# Patient Record
Sex: FEMALE | Race: WHITE | NOT HISPANIC OR LATINO | Employment: FULL TIME | URBAN - METROPOLITAN AREA
[De-identification: names, ages, dates, MRNs, and addresses within clinical notes are randomized per-mention and may not be internally consistent; named-entity substitution may affect disease eponyms.]

---

## 2017-10-26 ENCOUNTER — ALLSCRIPTS OFFICE VISIT (OUTPATIENT)
Dept: OTHER | Facility: OTHER | Age: 62
End: 2017-10-26

## 2017-10-27 NOTE — PROGRESS NOTES
Assessment  1  Hyperlipemia (272 4) (E78 5)   2  Hypertension (401 9) (I10)   3  Hyperglycemia (790 29) (R73 9)   4  Hypothyroidism (244 9) (E03 9)    Plan  Hyperlipemia    · Levothyroxine Sodium 200 MCG Oral Tablet; TAKE ONE TABLET BY MOUTH EVERY DAY  Hyperlipemia, Hypertension    · Valsartan-Hydrochlorothiazide 320-12 5 MG Oral Tablet; TAKE 1 TABLET ONCE DAILY  Hypertension    · AmLODIPine Besylate 2 5 MG Oral Tablet; TAKE 1 TABLET DAILY  Hypothyroidism    · Follow-up visit in 6 months Evaluation and Treatment  Follow-up  Status: Hold For - Scheduling   Requested for: 26Oct2017    Discussion/Summary  Discussion Summary:   1  Hyperlipidemia  Patient states she is working stream the harder on her diet and expects to see some improvement in her cholesterol profile but she states this time she cannot afford to have any lab test performed  She patient promises to call when she has reinstituted her healthcare plan and can afford to have the labs done  Hypertension  As noted patient's blood pressure is still mildly elevated but she was told that if she continues to work on her weight may be able to reduce and even possibly discontinue medicines in the future  Patient is encouraged to continue to work hard on her weight loss  Hyperglycemia  As per 1 and 2 above  Hypothyroidism  Patient will continue with present thyroid medication and again she will have labs instituted once she is able to afford this  Counseling Documentation With Imm: The patient was counseled regarding diagnostic results,-- instructions for management,-- risk factor reductions,-- prognosis,-- patient and family education,-- impressions,-- risks and benefits of treatment options,-- importance of compliance with treatment  Medication SE Review and Pt Understands Tx: Possible side effects of new medications were reviewed with the patient/guardian today  The treatment plan was reviewed with the patient/guardian   The patient/guardian understands and agrees with the treatment plan      Chief Complaint  Chief Complaint Free Text Note Form: patient is here for 6 month follow up   [address] [place] [address] Zahra: Patient is here today for follow up of chronic conditions described in HPI  History of Present Illness  HPI: Patient is a 27-year-old female with a history of hypertension, hypothyroidism  Patient is here today for routine follow-up after 6 month period of time  Patient states she has been working extremely hard on her diet and with her calculations has lost 22 lb over the past 6 months  She states she is feeling much more energetic and she will continue with the diet and hopes are to lose approximately 100 lb from the beginning and get down to 150 lb  She was commended on this and the fact that she is doing this in increments and she is very successful  As noted patient's blood pressure today is mildly elevated and patient states she is having difficulties with being able to afford the medication and will be checking to see if she can get a better price at of different pharmacy  We did give her some suggestions  Review of Systems  Complete-Female:   Constitutional: recent weight loss, but-- as noted in HPI,-- no fever,-- not feeling poorly,-- no recent weight gain,-- no chills-- and-- not feeling tired  Eyes: No complaints of eye pain, no red eyes, no eyesight problems, no discharge, no dry eyes, no itching of eyes  ENT: no complaints of earache, no loss of hearing, no nose bleeds, no nasal discharge, no sore throat, no hoarseness  Cardiovascular: lower extremity edema-- and-- Some episodic lower extremity edema but this is not new and has not changed recently, but-- the heart rate was not slow,-- no chest pain,-- no intermittent leg claudication,-- the heart rate was not fast-- and-- no palpitations  Respiratory: No complaints of shortness of breath, no wheezing, no cough, no SOB on exertion, no orthopnea, no PND  Gastrointestinal: We discussed having a routine colonoscopy and patient states that she will have this arranged, but-- No complaints of abdominal pain, no constipation, no nausea or vomiting, no diarrhea, no bloody stools  Genitourinary: No complaints of dysuria, no incontinence, no pelvic pain, no dysmenorrhea, no vaginal discharge or bleeding  Musculoskeletal: arthralgias-- and-- joint stiffness, but-- no joint swelling,-- no limb pain,-- no myalgias-- and-- no limb swelling  Integumentary: No complaints of skin rash or lesions, no itching, no skin wounds, no breast pain or lump  Neurological: No complaints of headache, no confusion, no convulsions, no numbness, no dizziness or fainting, no tingling, no limb weakness, no difficulty walking  Psychiatric: Not suicidal, no sleep disturbance, no anxiety or depression, no change in personality, no emotional problems  Endocrine: No complaints of proptosis, no hot flashes, no muscle weakness, no deepening of the voice, no feelings of weakness  Hematologic/Lymphatic: No complaints of swollen glands, no swollen glands in the neck, does not bleed easily, does not bruise easily  ROS Reviewed:   ROS reviewed  Active Problems  1  Generalized osteoarthritis of hand (715 04) (M15 9)   2  Hyperglycemia (790 29) (R73 9)   3  Hyperlipemia (272 4) (E78 5)   4  Hypertension (401 9) (I10)   5  Hypothyroidism (244 9) (E03 9)   6  Vitamin D deficiency (268 9) (E55 9)    Family History  Father    1  Family history of heart disease (V17 49) (Z82 49)    Social History   · Former smoker (V15 82) (Z80 086)    Current Meds   1  AmLODIPine Besylate 2 5 MG Oral Tablet; Take 1 tablet daily; Therapy: 92SGX2105 to (Evaluate:80Mwq0805)  Requested for: 93KWK9297; Last Rx:09Mar2017   Ordered   2  Daily Multiple Vitamins Oral Tablet; TAKE 1 TABLET DAILY; Therapy: 48WLM2174 to [phone] Recorded   3   Diclofenac Sodium 1 % Transdermal Gel; APPLY TO UPPER EXTREMITIES, 2 GM OF GEL TO   AFFECTED AREA 4 TIMES DAILY  DO NOT APPLY MORE THAN 8 GM DAILY TO ANY ONE AFFECTED   JOINT; Therapy: 14VOG5119 to (Last Rx:09Jan2017) Ordered   4  Levothyroxine Sodium 200 MCG Oral Tablet; TAKE ONE TABLET BY MOUTH EVERY DAY; Therapy: 12IWY6337 to (Evaluate:04Sep2017)  Requested for: 32QUP5967; Last Rx:09Mar2017   Ordered   5  Magnesium 200 MG Oral Tablet; TAKE 1 TABLET DAILY AS DIRECTED; Therapy: 26GRG7719 to Recorded   6  Valsartan-Hydrochlorothiazide 320-12 5 MG Oral Tablet; Take 1 tablet once daily; Therapy: 46ZIX0135 to (Evaluate:04Sep2017)  Requested for: 70GNV5345; Last Rx:09Mar2017   Ordered   7  Vitamin D 2000 UNIT Oral Tablet; Take 1 tablet daily; Therapy: 96BLN8304 to (Evaluate:24Apr2018) Recorded    Allergies  1  No Known Drug Allergies    Vitals  Vital Signs    Recorded: 19HRL9985 06:48PM   Temperature 98 1 F   Heart Rate 92   Respiration 16   Systolic 949   Diastolic 82   Height 5 ft    Weight 220 lb    BMI Calculated 42 97   BSA Calculated 1 94   O2 Saturation 95     Physical Exam    Constitutional Pleasant overweight 29-year-old female who is awake alert in no acute distress and oriented Ã3  Eyes   Conjunctiva and lids: No swelling, erythema or discharge  Pupils and irises: Equal, round and reactive to light  Ears, Nose, Mouth, and Throat   External inspection of ears and nose: Normal     Otoscopic examination: Tympanic membranes translucent with normal light reflex  Canals patent without erythema  Nasal mucosa, septum, and turbinates: Normal without edema or erythema  Oropharynx: Abnormal  -- Some redundancy to her oral mucosa and slightly enlarged tongue  Pulmonary   Respiratory effort: No increased work of breathing or signs of respiratory distress  Auscultation of lungs: Clear to auscultation  Cardiovascular   Palpation of heart: Normal PMI, no thrills      Auscultation of heart: Normal rate and rhythm, normal S1 and S2, without murmurs  Examination of extremities for edema and/or varicosities: Abnormal  -- Trace edema bilateral lower extremities  Carotid pulses: Normal     Abdomen   Abdomen: Abnormal  -- Obese soft nontender with positive bowel sounds Ã4 quadrants  Liver and spleen: No hepatomegaly or splenomegaly  Musculoskeletal   Gait and station: Normal     Digits and nails: Abnormal  -- Minor arthritic changes to the hands and digits  Inspection/palpation of joints, bones, and muscles: Normal     Skin   Skin and subcutaneous tissue: Normal without rashes or lesions  Neurologic   Cranial nerves: Cranial nerves 2-12 intact  Reflexes: Abnormal  -- Absent patella and Achilles tendon reflexes bilaterally  Sensation: No sensory loss      Psychiatric   Orientation to person, place, and time: Normal     Mood and affect: Normal          Future Appointments    Date/Time Provider Specialty Site   12/28/2017 05:00 PM Mikayla Finnegan DO Internal Medicine [facility]   Electronically signed by : Britney Panda DO; Oct 26 2017  7:41PM EST                       (Author)

## 2017-12-28 ENCOUNTER — ALLSCRIPTS OFFICE VISIT (OUTPATIENT)
Dept: OTHER | Facility: OTHER | Age: 62
End: 2017-12-28

## 2017-12-28 DIAGNOSIS — E78.5 HYPERLIPIDEMIA: ICD-10-CM

## 2017-12-29 NOTE — PROGRESS NOTES
Assessment   1  Acute sinusitis (461 9) (J01 90)   2  Hyperlipemia (272 4) (E78 5)   3  Hypertension (401 9) (I10)   4  Hypothyroidism (244 9) (E03 9)    Plan   Acute sinusitis    · Amoxicillin-Pot Clavulanate 500-125 MG Oral Tablet (Augmentin); TAKE 1 TABLET TWICE DAILY    AFTER MEALS UNTIL FINISHED  Hyperlipemia    · (1) LIPID PANEL, FASTING; Status:Active; Requested for:22Ikv4707;    · (1) TSH; Status:Active; Requested for:11Ofh5042;   Hypothyroidism    · Follow-up visit in 6 months Evaluation and Treatment  Follow-up  Status: Hold For - Scheduling     Requested for: 19Wxl0358    Discussion/Summary   Discussion Summary:    1  Acute sinusitis  Patient was placed on Augmentin 500 milligrams p o  b i d  with food for the next 10 days  She was told if not improving to please call for re-evaluation  The choice of antibiotics were made because patient has been sick for over 10 days  Hyperlipidemia  Had patient was unable to afford to have a lipid profile performed recently and hopefully she will be able to obtain this in the near future  A slip was given for lipid profile to be for performed prior to her next visit in 6 months  Hypertension  Patient's blood pressure is mildly elevated  Patient is to continue with present medication  She still has not talk to her pharmacist about cost saving alternatives to the start in  Patient will notify us if they have found a medication that will be effective at a lower cost    Hypothyroidism  Patient's thyroid function has been stable and patient will have this checked again in 6 months  Counseling Documentation With Imm: The patient was counseled regarding diagnostic results,-- instructions for management,-- risk factor reductions,-- prognosis,-- patient and family education,-- impressions,-- risks and benefits of treatment options,-- importance of compliance with treatment      Medication SE Review and Pt Understands Tx: Possible side effects of new medications were reviewed with the patient/guardian today  The treatment plan was reviewed with the patient/guardian  The patient/guardian understands and agrees with the treatment plan      Chief Complaint   Chief Complaint Free Text Note Form: Patient is here today for a follow up  Chief Complaint Chronic Condition [facility]: Patient is here today for follow up of chronic conditions described in HPI  History of Present Illness   HPI: Patient is a 81-year-old female with a history of hypertension, hyperlipidemia, hyperglycemia, hypothyroidism, vitamin-D deficiency  Patient is here today after 6 month period of time for routine follow-up  Patient states that she recently has lost all her medical insurance and she would was not able to afford having blood testing done prior to the visit today  She admits to the fact that she is not watching her diet and is not losing weight as she hopes  Patient also states she is suffering from upper respiratory tract infection and has been ill for approximately 10 days  She has nasal congestion, sore throat, cough and feeling somewhat fatigued  Review of Systems   Complete-Female:      Constitutional: feeling tired, but-- no fever,-- not feeling poorly,-- no recent weight gain,-- no chills-- and-- no recent weight loss  Eyes: No complaints of eye pain, no red eyes, no eyesight problems, no discharge, no dry eyes, no itching of eyes  ENT: sore throat,-- nasal discharge-- and-- hoarseness, but-- no earache-- and-- no hearing loss  Cardiovascular: No complaints of slow heart rate, no fast heart rate, no chest pain, no palpitations, no leg claudication, no lower extremity edema  Respiratory: cough, but-- no shortness of breath,-- no orthopnea,-- no wheezing,-- no shortness of breath during exertion-- and-- no PND  Genitourinary: No complaints of dysuria, no incontinence, no pelvic pain, no dysmenorrhea, no vaginal discharge or bleeding        Musculoskeletal: No complaints of arthralgias, no myalgias, no joint swelling or stiffness, no limb pain or swelling  Integumentary: No complaints of skin rash or lesions, no itching, no skin wounds, no breast pain or lump  Neurological: No complaints of headache, no confusion, no convulsions, no numbness, no dizziness or fainting, no tingling, no limb weakness, no difficulty walking  Psychiatric: Not suicidal, no sleep disturbance, no anxiety or depression, no change in personality, no emotional problems  Endocrine: No complaints of proptosis, no hot flashes, no muscle weakness, no deepening of the voice, no feelings of weakness  Hematologic/Lymphatic: No complaints of swollen glands, no swollen glands in the neck, does not bleed easily, does not bruise easily  ROS Reviewed:    ROS reviewed  Active Problems   1  Generalized osteoarthritis of hand (715 04) (M15 9)   2  Hyperglycemia (790 29) (R73 9)   3  Hyperlipemia (272 4) (E78 5)   4  Hypertension (401 9) (I10)   5  Hypothyroidism (244 9) (E03 9)   6  Vitamin D deficiency (268 9) (E55 9)    Past Medical History   Active Problems And Past Medical History Reviewed: The active problems and past medical history were reviewed and updated today  Surgical History   Surgical History Reviewed: The surgical history was reviewed and updated today  Family History   Father    1  Family history of heart disease (V17 49) (Z82 49)  Family History Reviewed: The family history was reviewed and updated today  Social History    · Former smoker (S08 68) (D84 298)  Social History Reviewed: The social history was reviewed and updated today  The social history was reviewed and is unchanged  Current Meds    1  AmLODIPine Besylate 2 5 MG Oral Tablet; TAKE 1 TABLET DAILY; Therapy: 10AEF0270 to 452 8137); Last Rx:26Oct2017 Ordered   2  AmLODIPine Besylate 2 5 MG Oral Tablet; Take 1 tablet daily;      Therapy: 19JUI6530 to (Evaluate:04Sep2017)  Requested for: 75ZFJ0821; Last Rx:09Mar2017     Ordered   3  Daily Multiple Vitamins Oral Tablet; TAKE 1 TABLET DAILY; Therapy: 57BDL9060 to (Milady Montiel) Recorded   4  Diclofenac Sodium 1 % Transdermal Gel; APPLY TO UPPER EXTREMITIES, 2 GM OF GEL TO     AFFECTED AREA 4 TIMES DAILY  DO NOT APPLY MORE THAN 8 GM DAILY TO ANY ONE AFFECTED     JOINT; Therapy: 39JJB8255 to (Last Rx:09Jan2017) Ordered   5  Levothyroxine Sodium 200 MCG Oral Tablet; TAKE ONE TABLET BY MOUTH EVERY DAY; Therapy: 15OGX7448 to ); Last Rx:26Oct2017 Ordered   6  Levothyroxine Sodium 200 MCG Oral Tablet; TAKE ONE TABLET BY MOUTH EVERY DAY; Therapy: 52OCH7010 to (Evaluate:04Sep2017)  Requested for: 43SJK5778; Last Rx:09Mar2017     Ordered   7  Magnesium 200 MG Oral Tablet; TAKE 1 TABLET DAILY AS DIRECTED; Therapy: 30VOR6962 to Recorded   8  Valsartan-Hydrochlorothiazide 320-12 5 MG Oral Tablet; TAKE 1 TABLET ONCE DAILY; Therapy: 93XSW7944 to ); Last Rx:26Oct2017 Ordered   9  Valsartan-Hydrochlorothiazide 320-12 5 MG Oral Tablet; Take 1 tablet once daily; Therapy: 64BTV6753 to (Evaluate:04Sep2017)  Requested for: 88GWY3779; Last Rx:09Mar2017     Ordered   10  Vitamin D 2000 UNIT Oral Tablet; Take 1 tablet daily; Therapy: 58RED3664 to ) Recorded  Medication List Reviewed: The medication list was reviewed and updated today  Allergies   1  No Known Drug Allergies    Vitals   Vital Signs    Recorded: 88LJX8953 04:52PM   Temperature 98 8 F   Heart Rate 95   Systolic 203   Diastolic 78   Height 5 ft    Weight 222 lb    BMI Calculated 43 36   BSA Calculated 1 95   O2 Saturation 94     Physical Exam        Constitutional Mildly ill-appearing, congested-sounding 29-year-old female who is awake alert  Eyes      Conjunctiva and lids: No swelling, erythema or discharge  Pupils and irises: Equal, round and reactive to light  Ears, Nose, Mouth, and Throat      External inspection of ears and nose: Normal        Otoscopic examination: Tympanic membranes translucent with normal light reflex  Canals patent without erythema  Nasal mucosa, septum, and turbinates: Abnormal  -- Generalized erythema to nasal mucosa with enlarged written turbinates and a thick yellow discharge  Oropharynx: Abnormal  -- Diffuse erythema to posterior airway  Pulmonary      Respiratory effort: No increased work of breathing or signs of respiratory distress  Auscultation of lungs: Clear to auscultation  Cardiovascular      Palpation of heart: Normal PMI, no thrills  Auscultation of heart: Normal rate and rhythm, normal S1 and S2, without murmurs  Abdomen      Abdomen: Abnormal  -- Obese  Liver and spleen: No hepatomegaly or splenomegaly  Soft nontender      Lymphatic      Palpation of lymph nodes in neck: No lymphadenopathy  Musculoskeletal      Gait and station: Normal        Neurologic      Cranial nerves: Cranial nerves 2-12 intact  Reflexes: 2+ and symmetric  Sensation: No sensory loss  Psychiatric      Orientation to person, place, and time: Normal        Mood and affect: Normal           Results/Data   PHQ-2 Adult Depression Screening 36Sdr0832 04:57PM User, [facility]      Test Name Result Flag Reference   PHQ-2 Adult Depression Score 0     Over the last two weeks, how often have you been bothered by any of the following problems?      Little interest or pleasure in doing things: Not at all - 0     Feeling down, depressed, or hopeless: Not at all - 0   PHQ-2 Adult Depression Screening Negative          Future Appointments      Date/Time Provider Specialty Site   06/28/2018 05:15 PM Martha Gauthier DO Internal Medicine [address] [place] [address] INTERNAL MED     Signatures    Electronically signed by : Ivet Wood DO; Dec 28 2017  6:42PM EST                       (Author)

## 2018-01-11 NOTE — PROGRESS NOTES
Assessment    1  Hyperglycemia (790 29) (R73 9)   2  Hyperlipemia (272 4) (E78 5)   3  Hypertension (401 9) (I10)   4  Encounter for preventive health examination (V70 0) (Z00 00)   5  Hypothyroidism (244 9) (E03 9)   6  Vitamin D deficiency (268 9) (E55 9)    Plan  Hyperglycemia    · (1) GLUCOSE,  FASTING; Status:Active; Requested for:22Ebq7224;    · (1) HEMOGLOBIN A1C; Status:Active; Requested for:76Myd3070;   Hyperlipemia    · (1) LIPID PANEL, FASTING; Status:Active; Requested for:43Cen0960;   Hypothyroidism    · Follow-up visit in 4 Months Evaluation and Treatment  Follow-up  Status: Hold For -  Scheduling  Requested for: 19Mkj4640  Unlinked    · ALPRAZolam 0 25 MG Oral Tablet   · Levothyroxine Sodium 200 MCG Oral Tablet    Discussion/Summary  health maintenance visit Currently, she eats a poor diet and has an inadequate exercise regimen  the risks and benefits of cervical cancer screening were discussed the patient declines cervical cancer screening Breast cancer screening: the risks and benefits of breast cancer screening were discussed and mammogram has been ordered  Colorectal cancer screening: the risks and benefits of colorectal cancer screening were discussed and fecal occult blood testing is needed every year  Osteoporosis screening: the risks and benefits of osteoporosis screening were discussed and the patient declines bone mineral density testing  The risks and benefits of immunizations were discussed, immunizations are needed and patient declines immunizations  Advice and education were given regarding nutrition, aerobic exercise, weight loss, vitamin D supplements, sunscreen use and advanced directive planning  Patient discussion: discussed with the patient  #1  Hyperglycemia  As noted patient's blood sugar is elevated and patient does have a strong family history of diabetes  Patient states she's working on her diet and trying to reduce her caloric intake to 1800 mathew a day   We will check a fasting blood sugar, hemoglobin A1c with her next visit and treat accordingly  Patient was told at this point in time she is a diabetic [place] and not a diabetic  #2  Hyperlipidemia  Especially in light of the fact that she has a high glucose her cholesterol profile is not optimal  Again patient states she'll be working on her diet and will try to get her cholesterol down and we will check this again in another 4 months  #3  Hypertension patient's blood pressure was initially quite elevated when she came into the office today  Once patient was allowed to relax it did come down to an acceptable range and patient will continue on present  #4  Health maintenance  I did discuss with the patient the importance of having a routine  Ecological examination on a yearly basis or by a yearly basis and patient at this point in time refuses  Patient is willing to go for mammogram but is declining go for routine colonoscopy  Patient performs Hemoccults slides and we will twist her arm if necessary in order to do a colonoscopy if the Hemoccults are positive  #5  Hypothyroidism  Patient's thyroid levels show good control  #6  Vitamin D deficiency  She will decrease her supplements to 3000 international units a day  The patient was counseled regarding diagnostic results, instructions for management, risk factor reductions, prognosis, patient and family education, impressions, risks and benefits of treatment options, importance of compliance with treatment  Chief Complaint  Here for general physical      Advance Directives  Advance Directive [facility]:   NO - Patient does not have an advance health care directive  History of Present Illness  , Adult Female: The patient is being seen for a health maintenance evaluation  The last health maintenance visit was Unknown year(s) ago  Social History: She is unmarried  Work status: working full time and Radiology technician   The patient is a former cigarette smoker  She has smoked for Approximately 25 pack years year(s)  She reports drinking 1 drink per day drinks per day  The patient has no concerns about alcohol abuse  She has never used illicit drugs  General Health: The patient's health since the last visit is described as fair  She has regular dental visits  She denies vision problems  She denies hearing loss  Immunizations status: up to date  Lifestyle:  She has weight concerns  She does not exercise regularly  She does not use tobacco  She consumes alcohol  She denies drug use  Reproductive health: the patient is postmenopausal   she reports normal menses  Screening: cancer screening reviewed and updated  metabolic screening reviewed and updated  risk screening reviewed and updated  HPI: Patient is a 81-year-old female with a history of hypertension, benign tumor of the brain, exogenous obesity  Patient is here today for routine physical  She did have labs performed prior to visit today we did discuss the results  Abnormalities include a fasting blood sugar of 114 end patient admits to a family history of diabetes  Her cholesterol is 242 with an HDL of 57 and LDL of 157 and triglyceride level of 143  Patient also had her vitamin D level checked which is 25 and she'll begin increase supplement  Patient states she's feeling well and has no new complaints or problems  She found out recently that her brother has developed diabetes  She denies any chest pain or pressure no increasing shortness of breath surgeon  She admits the fact that she can do much better with her diet not only for her blood sugar but also for her cholesterol  Pre-Diabetes: The patient is being seen for an initial evaluation of pre-diabetes  Recent measurements: fasting glucose date August 201 6 and fasting glucose 114 mg/dL  The patient is currently asymptomatic   Current treatment includes exercise regimen and weight loss program  By report, there is good compliance with treatment  Pertinent medical history:  obesity, dyslipidemia and hypertension, but no gestational diabetes, no hypertriglyceridemia, no polycystic ovarian syndrome, no hypothyroidism, no coronary artery disease, no peripheral vascular disease, no stroke, no transient ischemic attack, no depression and no obstructive sleep apnea  Family history:  diabetes, dyslipidemia and hypertension, but no pre-diabetes, no thyroid disease, no coronary artery disease, no peripheral vascular disease and no stroke  Pertinent social history:  alcohol use and sedentary lifestyle, but no tobacco use, no illicit drug use, no social isolation and no stress  Review of Systems    Constitutional: No fever, no chills, feels well, no tiredness, no recent weight gain or weight loss  Eyes: No complaints of eye pain, no red eyes, no eyesight problems, no discharge, no dry eyes, no itching of eyes  ENT: no complaints of earache, no loss of hearing, no nose bleeds, no nasal discharge, no sore throat, no hoarseness  Cardiovascular: No complaints of slow heart rate, no fast heart rate, no chest pain, no palpitations, no leg claudication, no lower extremity edema  Respiratory: No complaints of shortness of breath, no wheezing, no cough, no SOB on exertion, no orthopnea, no PND  Gastrointestinal: No complaints of abdominal pain, no constipation, no nausea or vomiting, no diarrhea, no bloody stools  Genitourinary: No complaints of dysuria, no incontinence, no pelvic pain, no dysmenorrhea, no vaginal discharge or bleeding  Musculoskeletal: No complaints of arthralgias, no myalgias, no joint swelling or stiffness, no limb pain or swelling  Integumentary: No complaints of skin rash or lesions, no itching, no skin wounds, no breast pain or lump  Neurological: No complaints of headache, no confusion, no convulsions, no numbness, no dizziness or fainting, no tingling, no limb weakness, no difficulty walking     Psychiatric: Not suicidal, no sleep disturbance, no anxiety or depression, no change in personality, no emotional problems  Endocrine: No complaints of proptosis, no hot flashes, no muscle weakness, no deepening of the voice, no feelings of weakness  Active Problems    1  Hyperglycemia (790 29) (R73 9)   2  Hyperlipemia (272 4) (E78 5)   3  Hypertension (401 9) (I10)   4  Hypothyroidism (244 9) (E03 9)   5  Vitamin D deficiency (268 9) (E55 9)    Current Meds   1  ALPRAZolam 0 25 MG Oral Tablet; Therapy: 21ZSX4092 to (Last Rx:17Ohv3099)  Requested for: 23Cxm6384 Ordered   2  AmLODIPine Besylate 2 5 MG Oral Tablet; Take 1 tablet daily; Therapy: 15TVI8634 to (Last Rx:47Xpp7673)  Requested for: 62OVN5629 Ordered   3  Diovan -25 MG Oral Tablet (Valsartan-Hydrochlorothiazide); Therapy: 52VNQ2511 to (Last Rx:25Oct2010)  Requested for: 25Oct2010 Ordered   4  Levothyroxine Sodium 200 MCG Oral Tablet; TAKE ONE TABLET BY MOUTH EVERY   DAY; Therapy: 37XCT1059 to (Last Rx:27May2016)  Requested for: 36QBT5769 Ordered   5  Levothyroxine Sodium 200 MCG Oral Tablet; Therapy: 84QWZ5546 to (Last Rx:25Oct2010)  Requested for: 25Oct2010 Ordered   6  Norvasc 2 5 MG Oral Tablet (AmLODIPine Besylate); One po daily; Therapy: 10TPF8177 to (Last Rx:77Iwj2524)  Requested for: 32ATG4923 Ordered   7  Valsartan-Hydrochlorothiazide 320-12 5 MG Oral Tablet (Diovan HCT); Take 1 tablet   once daily; Therapy: 09JUR2618 to (Last Rx:67Vqv9321)  Requested for: 75KGH2623 Ordered    Vitals   Recorded: 85Oyz5332 07:37PM Recorded: 29ZSB5865 52:03ER   Systolic 988 670   Diastolic 68 74   Heart Rate  104   Temperature  98 2 F   O2 Saturation  95   Height  5 ft    Weight  251 lb 6 08 oz   BMI Calculated  49 09   BSA Calculated  2 05     Physical Exam    Constitutional   General appearance: No acute distress, well appearing and well nourished  Pleasant overweight 26-year-old female who is awake alert in no acute distress     Head and Face   Head and face: Normal     Palpation of the face and sinuses: No sinus tenderness  Eyes   Conjunctiva and lids: No swelling, erythema or discharge  Pupils and irises: Abnormal   Some exophthalmos which is unchanged for this patient  Ophthalmoscopic examination: Normal fundi and optic discs  Ears, Nose, Mouth, and Throat   External inspection of ears and nose: Normal     Otoscopic examination: Tympanic membranes translucent with normal light reflex  Canals patent without erythema  Hearing: Normal     Nasal mucosa, septum, and turbinates: Normal without edema or erythema  Lips, teeth, and gums: Normal, good dentition  Oropharynx: Normal with no erythema, edema, exudate or lesions  Neck   Neck: Supple, symmetric, trachea midline, no masses  Thyroid: Normal, no thyromegaly  Pulmonary   Respiratory effort: No increased work of breathing or signs of respiratory distress  Percussion of chest: Normal     Palpation of chest: Normal     Auscultation of lungs: Clear to auscultation  Cardiovascular   Palpation of heart: Normal PMI, no thrills  Auscultation of heart: Normal rate and rhythm, normal S1 and S2, no murmurs  Carotid pulses: 2+ bilaterally  Abdominal aorta: Normal     Femoral pulses: 2+ bilaterally  Pedal pulses: 2+ bilaterally  Examination of extremities for edema and/or varicosities: Normal     Chest Patient defers breast examination and does not see a gynecologist    Abdomen   Abdomen: Abnormal   Obese soft nontender with positive bowel sounds Ã4 quadrants  Liver and spleen: No hepatomegaly or splenomegaly  Examination for hernias: No hernia appreciated  Awaiting Hemoccults  Genitourinary Discussed with patient having a routine Pap and pelvic examination and she refuses  Lymphatic   Palpation of lymph nodes in neck: No lymphadenopathy  Palpation of lymph nodes in axillae: No lymphadenopathy  Palpation of lymph nodes in groin: No lymphadenopathy      Palpation of lymph nodes in other areas: No lymphadenopathy  Musculoskeletal   Gait and station: Normal     Digits and nails: Normal without clubbing or cyanosis  Joints, bones, and muscles: Normal     Range of motion: Normal     Stability: Normal     Muscle strength/tone: Normal     Skin   Skin and subcutaneous tissue: Normal without rashes or lesions  Palpation of skin and subcutaneous tissue: Normal turgor  Neurologic   Cranial nerves: Cranial nerves II-XII intact  Cortical function: Normal mental status  Reflexes: 2+ and symmetric  Sensation: No sensory loss  Coordination: Normal finger to nose and heel to shin  Psychiatric   Judgment and insight: Normal     Orientation to person, place, and time: Normal     Recent and remote memory: Intact  Mood and affect: Normal        Results/Data  PHQ-2 Adult Depression Screening 88Wve3410 06:45PM User, s     Test Name Result Flag Reference   PHQ-2 Adult Depression Score 0     Over the last two weeks, how often have you been bothered by any of the following problems?   Little interest or pleasure in doing things: Not at all - 0  Feeling down, depressed, or hopeless: Not at all - 0   PHQ-2 Adult Depression Screening Negative         Future Appointments    Date/Time Provider Specialty Site   12/15/2016 06:45 PM Tyree Cruz DO Internal Medicine [address] [place] [address] INTERNAL MED     Signatures   Electronically signed by : Anya Jackson DO; Aug 23 2016  8:03PM EST                       (Author)

## 2018-01-13 VITALS
BODY MASS INDEX: 43.19 KG/M2 | WEIGHT: 220 LBS | TEMPERATURE: 98.1 F | RESPIRATION RATE: 16 BRPM | HEART RATE: 92 BPM | SYSTOLIC BLOOD PRESSURE: 144 MMHG | DIASTOLIC BLOOD PRESSURE: 82 MMHG | HEIGHT: 60 IN | OXYGEN SATURATION: 95 %

## 2018-01-22 VITALS
WEIGHT: 222 LBS | BODY MASS INDEX: 43.59 KG/M2 | OXYGEN SATURATION: 94 % | HEART RATE: 95 BPM | DIASTOLIC BLOOD PRESSURE: 78 MMHG | SYSTOLIC BLOOD PRESSURE: 152 MMHG | TEMPERATURE: 98.8 F | HEIGHT: 60 IN

## 2018-05-31 DIAGNOSIS — E03.9 ACQUIRED HYPOTHYROIDISM: ICD-10-CM

## 2018-05-31 DIAGNOSIS — I10 ESSENTIAL HYPERTENSION: Primary | ICD-10-CM

## 2018-05-31 RX ORDER — LEVOTHYROXINE SODIUM 0.2 MG/1
TABLET ORAL
Qty: 30 TABLET | Refills: 0 | Status: SHIPPED | OUTPATIENT
Start: 2018-05-31 | End: 2019-07-26 | Stop reason: SDUPTHER

## 2018-05-31 RX ORDER — AMLODIPINE BESYLATE 2.5 MG/1
TABLET ORAL
Qty: 30 TABLET | Refills: 0 | Status: SHIPPED | OUTPATIENT
Start: 2018-05-31 | End: 2019-07-26 | Stop reason: SDUPTHER

## 2018-06-28 ENCOUNTER — OFFICE VISIT (OUTPATIENT)
Dept: INTERNAL MEDICINE CLINIC | Facility: CLINIC | Age: 63
End: 2018-06-28

## 2018-06-28 VITALS
RESPIRATION RATE: 18 BRPM | TEMPERATURE: 99.6 F | SYSTOLIC BLOOD PRESSURE: 142 MMHG | WEIGHT: 224 LBS | HEART RATE: 80 BPM | DIASTOLIC BLOOD PRESSURE: 80 MMHG | HEIGHT: 60 IN | OXYGEN SATURATION: 98 % | BODY MASS INDEX: 43.98 KG/M2

## 2018-06-28 DIAGNOSIS — E03.9 ACQUIRED HYPOTHYROIDISM: ICD-10-CM

## 2018-06-28 DIAGNOSIS — E55.9 VITAMIN D DEFICIENCY: Primary | ICD-10-CM

## 2018-06-28 DIAGNOSIS — R73.9 HYPERGLYCEMIA: ICD-10-CM

## 2018-06-28 DIAGNOSIS — E78.01 FAMILIAL HYPERCHOLESTEROLEMIA: ICD-10-CM

## 2018-06-28 DIAGNOSIS — I10 ESSENTIAL HYPERTENSION: ICD-10-CM

## 2018-06-28 PROCEDURE — 99212 OFFICE O/P EST SF 10 MIN: CPT | Performed by: INTERNAL MEDICINE

## 2018-06-28 RX ORDER — LEVOTHYROXINE SODIUM 0.2 MG/1
200 TABLET ORAL DAILY
Qty: 30 TABLET | Refills: 3 | Status: SHIPPED | OUTPATIENT
Start: 2018-06-28 | End: 2019-01-25 | Stop reason: SDUPTHER

## 2018-06-28 RX ORDER — AMLODIPINE BESYLATE 2.5 MG/1
2.5 TABLET ORAL DAILY
Qty: 30 TABLET | Refills: 5 | Status: SHIPPED | OUTPATIENT
Start: 2018-06-28 | End: 2019-01-25 | Stop reason: SDUPTHER

## 2018-06-28 RX ORDER — VALSARTAN AND HYDROCHLOROTHIAZIDE 320; 12.5 MG/1; MG/1
1 TABLET, FILM COATED ORAL DAILY
Qty: 30 TABLET | Refills: 5 | Status: SHIPPED | OUTPATIENT
Start: 2018-06-28 | End: 2018-11-13 | Stop reason: SDUPTHER

## 2018-06-28 NOTE — ASSESSMENT & PLAN NOTE
Hypothyroidism  Patient recently had lab studies performed and her TSH is slightly low  This point time patient prefers to stay on 200 mcg of levothyroxine but she was told to take this only 6/7 days a week  We will continue to monitor her levels but she states she will be unable to afford to have this checked with her next visit

## 2018-06-28 NOTE — ASSESSMENT & PLAN NOTE
Hyperglycemia  Patient's fasting blood sugar is now normalized to 88  The patient relates that she is working very hard on her diet to eliminate concentrated sweets and simple carbohydrates from her diet    Patient was reassured at this point time she has no evidence of diabetes

## 2018-06-28 NOTE — ASSESSMENT & PLAN NOTE
Hyperlipidemia  Patient's cholesterol has dropped down to 232 patient's HDL cholesterol is 71 with an   Patient still is at higher risk for heart disease because of her cholesterol  She states she continues to work hard on her diet decrease her intake of fats and cholesterol  We will check another lipid profile when she returns in 6 months but because she does not have insurance she is unsure whether she will be able to afford this

## 2018-06-28 NOTE — ASSESSMENT & PLAN NOTE
Hypertension  As noted patient has a decrease in her blood pressure readings since her last visit  Patient will continue on the valsartan with hydrochlorothiazide and her amlodipine  We will modify treatment if necessary in the future    Patient had renal function checked and it is stable and normal

## 2018-06-28 NOTE — PROGRESS NOTES
Assessment/Plan:      Diagnoses and all orders for this visit:    Vitamin D deficiency  -     Vitamin D 25 hydroxy; Future    Acquired hypothyroidism  -     levothyroxine 200 mcg tablet; Take 1 tablet (200 mcg total) by mouth daily    Essential hypertension  -     amLODIPine (NORVASC) 2 5 mg tablet; Take 1 tablet (2 5 mg total) by mouth daily  -     valsartan-hydrochlorothiazide (DIOVAN-HCT) 320-12 5 MG per tablet; Take 1 tablet by mouth daily    Familial hypercholesterolemia  -     Lipid panel; Future    Hyperglycemia          Subjective:     Patient ID: Divine Dykes is a 59 y o  female  Patient is a 28-year-old female with a history of hypertension, hyperlipidemia, hypothyroidism  Patient is here today for follow-up after 6 month period of time  She did have labs performed prior to being seen today we did discuss the results specifically her cholesterol is improved as well as her blood sugar  All her other labs were normal other than a slightly low TSH the patient remains on levothyroxine  Patient states she is feeling well and she is getting regular exercise on a daily basis and is continuing to work        Review of Systems   Constitutional: Positive for activity change (Patient states during the summer months she has a lot more physically active)  Negative for appetite change, chills, diaphoresis, fatigue, fever and unexpected weight change  HENT: Negative  Eyes: Negative  Cardiovascular: Negative  Gastrointestinal: Negative  Endocrine: Negative  Genitourinary: Negative  Musculoskeletal: Negative  Skin: Negative  Neurological: Negative  Hematological: Negative  Psychiatric/Behavioral: Negative  Objective:     Physical Exam   Constitutional: She is oriented to person, place, and time  She appears well-developed and well-nourished  No distress     Very pleasant, overweight 28-year-old female who is awake alert no acute distress and oriented x3   HENT:   Head: Normocephalic and atraumatic  Left Ear: External ear normal    Nose: Nose normal    Mouth/Throat: Oropharynx is clear and moist  No oropharyngeal exudate  Some deformity to her right tympanic membrane which is chronic  Eyes: Conjunctivae and EOM are normal  Pupils are equal, round, and reactive to light  Right eye exhibits no discharge  Left eye exhibits no discharge  No scleral icterus  Neck: Normal range of motion  Neck supple  No JVD present  No tracheal deviation present  No thyromegaly present  Cardiovascular: Normal rate, regular rhythm, normal heart sounds and intact distal pulses  Pulmonary/Chest: Effort normal and breath sounds normal  No stridor  Abdominal: Soft  Bowel sounds are normal  She exhibits no distension  There is no tenderness  There is no rebound and no guarding  Obese soft nontender with positive bowel sounds x4 quadrants   Musculoskeletal: Normal range of motion  She exhibits no edema or tenderness  Lymphadenopathy:     She has no cervical adenopathy  Neurological: She is alert and oriented to person, place, and time  She has normal reflexes  She displays normal reflexes  No cranial nerve deficit  She exhibits normal muscle tone  Coordination normal    Skin: Skin is warm and dry  No rash noted  She is not diaphoretic  No erythema  No pallor  Psychiatric: She has a normal mood and affect  Her behavior is normal  Judgment and thought content normal    Nursing note and vitals reviewed

## 2018-10-28 DIAGNOSIS — E03.9 ACQUIRED HYPOTHYROIDISM: Primary | ICD-10-CM

## 2018-10-29 RX ORDER — LEVOTHYROXINE SODIUM 0.2 MG/1
TABLET ORAL
Qty: 30 TABLET | Refills: 0 | Status: SHIPPED | OUTPATIENT
Start: 2018-10-29 | End: 2018-11-26 | Stop reason: SDUPTHER

## 2018-11-13 DIAGNOSIS — I10 ESSENTIAL HYPERTENSION: ICD-10-CM

## 2018-11-13 RX ORDER — VALSARTAN AND HYDROCHLOROTHIAZIDE 320; 12.5 MG/1; MG/1
TABLET, FILM COATED ORAL
Qty: 30 TABLET | Refills: 0 | Status: SHIPPED | OUTPATIENT
Start: 2018-11-13 | End: 2019-01-12 | Stop reason: SDUPTHER

## 2018-11-26 DIAGNOSIS — E03.9 ACQUIRED HYPOTHYROIDISM: ICD-10-CM

## 2018-11-26 RX ORDER — LEVOTHYROXINE SODIUM 0.2 MG/1
TABLET ORAL
Qty: 30 TABLET | Refills: 0 | Status: SHIPPED | OUTPATIENT
Start: 2018-11-26 | End: 2018-12-25 | Stop reason: SDUPTHER

## 2018-12-25 DIAGNOSIS — E03.9 ACQUIRED HYPOTHYROIDISM: ICD-10-CM

## 2018-12-25 DIAGNOSIS — I10 ESSENTIAL HYPERTENSION: Primary | ICD-10-CM

## 2018-12-26 RX ORDER — LEVOTHYROXINE SODIUM 0.2 MG/1
TABLET ORAL
Qty: 30 TABLET | Refills: 0 | Status: SHIPPED | OUTPATIENT
Start: 2018-12-26 | End: 2019-01-25 | Stop reason: SDUPTHER

## 2018-12-26 RX ORDER — AMLODIPINE BESYLATE 2.5 MG/1
TABLET ORAL
Qty: 30 TABLET | Refills: 0 | Status: SHIPPED | OUTPATIENT
Start: 2018-12-26 | End: 2019-01-25 | Stop reason: SDUPTHER

## 2019-01-12 DIAGNOSIS — I10 ESSENTIAL HYPERTENSION: ICD-10-CM

## 2019-01-14 RX ORDER — VALSARTAN AND HYDROCHLOROTHIAZIDE 320; 12.5 MG/1; MG/1
TABLET, FILM COATED ORAL
Qty: 30 TABLET | Refills: 0 | Status: SHIPPED | OUTPATIENT
Start: 2019-01-14 | End: 2019-07-26 | Stop reason: SDUPTHER

## 2019-01-25 ENCOUNTER — OFFICE VISIT (OUTPATIENT)
Dept: INTERNAL MEDICINE CLINIC | Facility: CLINIC | Age: 64
End: 2019-01-25

## 2019-01-25 VITALS
BODY MASS INDEX: 44.65 KG/M2 | HEIGHT: 60 IN | HEART RATE: 95 BPM | DIASTOLIC BLOOD PRESSURE: 74 MMHG | SYSTOLIC BLOOD PRESSURE: 148 MMHG | OXYGEN SATURATION: 95 % | WEIGHT: 227.4 LBS | TEMPERATURE: 98.5 F | RESPIRATION RATE: 16 BRPM

## 2019-01-25 DIAGNOSIS — E66.01 MORBID OBESITY (HCC): ICD-10-CM

## 2019-01-25 DIAGNOSIS — E78.00 PURE HYPERCHOLESTEROLEMIA: ICD-10-CM

## 2019-01-25 DIAGNOSIS — E03.9 ACQUIRED HYPOTHYROIDISM: Primary | ICD-10-CM

## 2019-01-25 DIAGNOSIS — I10 ESSENTIAL HYPERTENSION: ICD-10-CM

## 2019-01-25 PROCEDURE — 99202 OFFICE O/P NEW SF 15 MIN: CPT | Performed by: INTERNAL MEDICINE

## 2019-01-25 RX ORDER — VALSARTAN AND HYDROCHLOROTHIAZIDE 320; 12.5 MG/1; MG/1
1 TABLET, FILM COATED ORAL DAILY
Qty: 30 TABLET | Refills: 5 | Status: SHIPPED | OUTPATIENT
Start: 2019-01-25 | End: 2019-07-26 | Stop reason: SDUPTHER

## 2019-01-25 RX ORDER — AMLODIPINE BESYLATE 5 MG/1
5 TABLET ORAL DAILY
Qty: 30 TABLET | Refills: 5 | Status: SHIPPED | OUTPATIENT
Start: 2019-01-25 | End: 2019-07-26 | Stop reason: SDUPTHER

## 2019-01-25 RX ORDER — LEVOTHYROXINE SODIUM 0.2 MG/1
200 TABLET ORAL
Qty: 30 TABLET | Refills: 5 | Status: SHIPPED | OUTPATIENT
Start: 2019-01-25 | End: 2019-07-26 | Stop reason: SDUPTHER

## 2019-01-25 NOTE — ASSESSMENT & PLAN NOTE
Patient states that she is watching to decrease her intake of fats and cholesterol with her diet  As noted patient has no insurance at this point time and has to pay cash for any studies that are performed  She states she is willing to have routine labs performed hopefully with her next visit but depending on the cost   We did discuss with the patient today the importance of weight loss and watching her intake of fats and cholesterol

## 2019-01-25 NOTE — PROGRESS NOTES
Assessment/Plan:    Essential hypertension  Patient's blood pressure is still moderately elevated today we with her visit to the office  We did discuss with the patient that 1 of the most important characteristics of reducing her blood pressure at this point time would be to work harder at her diet weight loss program   Patient also states that there is a lot of stress at work and this also has been making it very difficult for her  She plans to be seen in the office with her next visit before she goes to work and hopefully will show better control of her blood pressure  Hypothyroidism  Patient states that she cannot afford medical insurance at this point time and that having studies done her extremely costly  At this point we will continue her levothyroxine dose at 200 mcg per day and she states with her next visit she would be willing to have blood testing performed to ensure stability    Hyperlipemia  Patient states that she is watching to decrease her intake of fats and cholesterol with her diet  As noted patient has no insurance at this point time and has to pay cash for any studies that are performed  She states she is willing to have routine labs performed hopefully with her next visit but depending on the cost   We did discuss with the patient today the importance of weight loss and watching her intake of fats and cholesterol  Morbid obesity due to excess calories ([address] )  With the patient's BMI she is considered morbidly obese  We did discuss with the patient today the importance of watching her diet, reducing fats and cholesterol in her diet, losing weight and being activity involved with weight loss program   She states having no medical insurance she is not a candidate for bariatric evaluation and/or surgery  Diagnoses and all orders for this visit:    Acquired hypothyroidism  -     levothyroxine 200 mcg tablet;  Take 1 tablet (200 mcg total) by mouth daily in the early morning    Essential hypertension  -     valsartan-hydrochlorothiazide (DIOVAN-HCT) 320-12 5 MG per tablet; Take 1 tablet by mouth daily  -     amLODIPine (NORVASC) 5 mg tablet; Take 1 tablet (5 mg total) by mouth daily    Pure hypercholesterolemia    Morbid obesity (HCC)          Subjective:      Patient ID: Leonor Robbins is a 62 y o  female  Patient is a 32-year-old female with a history of hypertension, hyperlipidemia, hypothyroidism  Patient is here today for routine follow-up after 6 month period of time  Patient at this time is no longer has any medical insurance and was unable to afford having any blood testing done prior to the visit  She states in general she is doing about the same but she still having significant problems at work        The following portions of the patient's history were reviewed and updated as appropriate:   She  has no past medical history on file  She   Patient Active Problem List    Diagnosis Date Noted    [facility] to [facility] 01/25/2019    Hyperglycemia 08/23/2016    Hyperlipemia 08/23/2016    Hypothyroidism 05/27/2016    Essential hypertension 05/27/2015     She  has no past surgical history on file  Her family history includes Heart disease in her father  She  reports that she has quit smoking  She does not have any smokeless tobacco history on file  Her alcohol and drug histories are not on file    Current Outpatient Prescriptions   Medication Sig Dispense Refill    amLODIPine (NORVASC) 2 5 mg tablet TAKE 1 TABLET BY MOUTH EVERY DAY 30 tablet 0    levothyroxine 200 mcg tablet TAKE 1 TABLET BY MOUTH EVERY DAY 30 tablet 0    valsartan-hydrochlorothiazide (DIOVAN-HCT) 320-12 5 MG per tablet TAKE 1 TABLET BY MOUTH EVERY DAY 30 tablet 0    amLODIPine (NORVASC) 5 mg tablet Take 1 tablet (5 mg total) by mouth daily 30 tablet 5    levothyroxine 200 mcg tablet Take 1 tablet (200 mcg total) by mouth daily in the early morning 30 tablet 5    valsartan-hydrochlorothiazide (DIOVAN-HCT) 320-12 5 MG per tablet Take 1 tablet by mouth daily 30 tablet 5     No current facility-administered medications for this visit  Current Outpatient Prescriptions on File Prior to Visit   Medication Sig    amLODIPine (NORVASC) 2 5 mg tablet TAKE 1 TABLET BY MOUTH EVERY DAY    levothyroxine 200 mcg tablet TAKE 1 TABLET BY MOUTH EVERY DAY    valsartan-hydrochlorothiazide (DIOVAN-HCT) 320-12 5 MG per tablet TAKE 1 TABLET BY MOUTH EVERY DAY    [DISCONTINUED] amLODIPine (NORVASC) 2 5 mg tablet Take 1 tablet (2 5 mg total) by mouth daily    [DISCONTINUED] amLODIPine (NORVASC) 2 5 mg tablet TAKE 1 TABLET BY MOUTH EVERY DAY    [DISCONTINUED] levothyroxine 200 mcg tablet Take 1 tablet (200 mcg total) by mouth daily    [DISCONTINUED] levothyroxine 200 mcg tablet TAKE 1 TABLET BY MOUTH EVERY DAY     No current facility-administered medications on file prior to visit  She has No Known Allergies       Review of Systems   Constitutional: Positive for activity change (Patient relates that she is not involved in any routine exercise program)  Negative for appetite change, chills, diaphoresis, fatigue, fever and unexpected weight change  HENT: Negative  Eyes: Negative  Respiratory: Positive for shortness of breath ( patient states that she does have some shortness of breath with brisk exertion but no chest pain or pressure)  Negative for apnea, cough, choking, chest tightness, wheezing and stridor  Cardiovascular: Positive for leg swelling ( some chronic edema to lower extremities and patient does wear support hose)  Negative for chest pain and palpitations  Gastrointestinal: Negative  Endocrine: Negative  Genitourinary: Negative  Musculoskeletal: Positive for arthralgias ( some generalized arthritic aches and pains but nothing new or disabling at this time)  Negative for back pain, gait problem, joint swelling, myalgias, neck pain and neck stiffness  Skin: Negative  Allergic/Immunologic: Negative  Hematological: Negative  Psychiatric/Behavioral: Positive for sleep disturbance ( problems occasionally sleeping because of concern about her work environment)  Negative for agitation, behavioral problems, confusion, decreased concentration, dysphoric mood, hallucinations, self-injury and suicidal ideas  The patient is nervous/anxious ( patient states that she has a lot of anxiety secondary to problems at work  )  The patient is not hyperactive  Objective:      /74   Pulse 95   Temp 98 5 °F (36 9 °C)   Resp 16   Ht 5' (1 524 m)   Wt 103 kg (227 lb 6 4 oz)   SpO2 95%   BMI 44 41 kg/m²          Physical Exam   Constitutional: She is oriented to person, place, and time  She appears well-developed and well-nourished  No distress  Pleasant, cheerful 52-year-old female who is awake alert, morbidly obese   HENT:   Head: Normocephalic and atraumatic  Right Ear: External ear normal    Left Ear: External ear normal    Nose: Nose normal    Mouth/Throat: Oropharynx is clear and moist  No oropharyngeal exudate  Eyes: Pupils are equal, round, and reactive to light  Conjunctivae and EOM are normal  Right eye exhibits no discharge  Left eye exhibits no discharge  No scleral icterus  Neck: No JVD present  No tracheal deviation present  No thyromegaly present  Very thick neck with some decreased range of motion both passively and actively but no pain with movement   Cardiovascular: Normal rate, regular rhythm, normal heart sounds and intact distal pulses  Exam reveals no gallop and no friction rub  No murmur heard  Pulmonary/Chest: Effort normal and breath sounds normal  No stridor  No respiratory distress  She has no wheezes  She has no rales  She exhibits no tenderness  Abdominal: Soft  Bowel sounds are normal  She exhibits no distension and no mass  There is no tenderness  There is no rebound and no guarding     Obese   Musculoskeletal: She exhibits edema (Plus one edema to lower extremities, support hose in place, some arthritic changes to the hands and digits) and deformity  She exhibits no tenderness  Lymphadenopathy:     She has no cervical adenopathy  Neurological: She is alert and oriented to person, place, and time  No cranial nerve deficit  Skin: Skin is warm and dry  She is not diaphoretic  Psychiatric: Her behavior is normal  Judgment and thought content normal    Mildly anxious mood and affect especially discussing work   Nursing note and vitals reviewed

## 2019-01-25 NOTE — ASSESSMENT & PLAN NOTE
Patient states that she cannot afford medical insurance at this point time and that having studies done her extremely costly    At this point we will continue her levothyroxine dose at 200 mcg per day and she states with her next visit she would be willing to have blood testing performed to ensure stability

## 2019-01-25 NOTE — PATIENT INSTRUCTIONS
Low Fat Diet   AMBULATORY CARE:   A low-fat diet  is an eating plan that is low in total fat, unhealthy fat, and cholesterol  You may need to follow a low-fat diet if you have trouble digesting or absorbing fat  You may also need to follow this diet if you have high cholesterol  You can also lower your cholesterol by increasing the amount of fiber in your diet  Soluble fiber is a type of fiber that helps to decrease cholesterol levels  Different types of fat in food:   · Limit unhealthy fats  A diet that is high in cholesterol, saturated fat, and trans fat may cause unhealthy cholesterol levels  Unhealthy cholesterol levels increase your risk of heart disease  ¨ Cholesterol:  Limit intake of cholesterol to less than 200 mg per day  Cholesterol is found in meat, eggs, and dairy  ¨ Saturated fat:  Limit saturated fat to less than 7% of your total daily calories  Ask your dietitian how many calories you need each day  Saturated fat is found in butter, cheese, ice cream, whole milk, and palm oil  Saturated fat is also found in meat, such as beef, pork, chicken skin, and processed meats  Processed meats include sausage, hot dogs, and bologna  ¨ Trans fat:  Avoid trans fat as much as possible  Trans fat is used in fried and baked foods  Foods that say trans fat free on the label may still have up to 0 5 grams of trans fat per serving  · Include healthy fats  Replace foods that are high in saturated and trans fat with foods high in healthy fats  This may help to decrease high cholesterol levels  ¨ Monounsaturated fats: These are found in avocados, nuts, and vegetable oils, such as olive, canola, and sunflower oil  ¨ Polyunsaturated fats: These can be found in vegetable oils, such as soybean or corn oil  Omega-3 fats can help to decrease the risk of heart disease  Omega-3 fats are found in fish, such as salmon, herring, trout, and tuna   Omega-3 fats can also be found in plant foods, such as walnuts, flaxseed, soybeans, and canola oil    Foods to limit or avoid:   · Grains:      ¨ Snacks that are made with partially hydrogenated oils, such as chips, regular crackers, and butter-flavored popcorn    ¨ High-fat baked goods, such as biscuits, croissants, doughnuts, pies, cookies, and pastries    · Dairy:      ¨ Whole milk, 2% milk, and yogurt and ice cream made with whole milk    ¨ Half and half creamer, heavy cream, and whipping cream    ¨ Cheese, cream cheese, and sour cream    · Meats and proteins:      ¨ High-fat cuts of meat (T-bone steak, regular hamburger, and ribs)    ¨ Fried meat, poultry (turkey and chicken), and fish    ¨ Poultry (chicken and turkey) with skin    ¨ Cold cuts (salami or bologna), hot dogs, almendarez, and sausage    ¨ Whole eggs and egg yolks    · Vegetables and fruits with added fat:      ¨ Fried vegetables or vegetables in butter or high-fat sauces, such as cream or cheese sauces    ¨ Fried fruit or fruit served with butter or cream    · Fats:      ¨ Butter, stick margarine, and shortening    ¨ Coconut, palm oil, and palm kernel oil  Foods to include:   · Grains:      ¨ Whole-grain breads, cereals, pasta, and brown rice    ¨ Low-fat crackers and pretzels    · Vegetables and fruits:      ¨ Fresh, frozen, or canned vegetables (no salt or low-sodium)    ¨ Fresh, frozen, dried, or canned fruit (canned in light syrup or fruit juice)    ¨ Avocado    · Low-fat dairy products:      ¨ Nonfat (skim) or 1% milk    ¨ Nonfat or low-fat cheese, yogurt, and cottage cheese    · Meats and proteins:      ¨ Chicken or turkey with no skin    ¨ Baked or broiled fish    ¨ Lean beef and pork (loin, round, extra lean hamburger)    ¨ Beans and peas, unsalted nuts, soy products    ¨ Egg whites and substitutes    ¨ Seeds and nuts    · Fats:      ¨ Unsaturated oil, such as canola, olive, peanut, soybean, or sunflower oil    ¨ Soft or liquid margarine and vegetable oil spread    ¨ Low-fat salad dressing  Other ways to decrease fat:   · Read food labels before you buy foods  Choose foods that have less than 30% of calories from fat  Choose low-fat or fat-free dairy products  Remember that fat free does not mean calorie free  These foods still contain calories, and too many calories can lead to weight gain  · Trim fat from meat and avoid fried food  Trim all visible fat from meat before you cook it  Remove the skin from poultry  Do not bejarano meat, fish, or poultry  Bake, roast, boil, or broil these foods instead  Avoid fried foods  Eat a baked potato instead of [place] fries  Steam vegetables instead of sautéing them in butter  · Add less fat to foods  Use imitation almendarez bits on salads and baked potatoes instead of regular almendarez bits  Use fat-free or low-fat salad dressings instead of regular dressings  Use low-fat or nonfat butter-flavored topping instead of regular butter or margarine on popcorn and other foods  Ways to decrease fat in recipes:  Replace high-fat ingredients with low-fat or nonfat ones  This may cause baked goods to be drier than usual  You may need to use nonfat cooking spray on pans to prevent food from sticking  You also may need to change the amount of other ingredients, such as water, in the recipe  Try the following:  · Use low-fat or light margarine instead of regular margarine or shortening  · Use lean ground turkey breast or chicken, or lean ground beef (less than 5% fat) instead of hamburger  · Add 1 teaspoon of canola oil to 8 ounces of skim milk instead of using cream or half and half  · Use grated zucchini, carrots, or apples in breads instead of coconut  · Use blenderized, low-fat cottage cheese, plain tofu, or low-fat ricotta cheese instead of cream cheese  · Use 1 egg white and 1 teaspoon of canola oil, or use ¼ cup (2 ounces) of fat-free egg substitute instead of a whole egg       · Replace half of the oil that is called for in a recipe with applesauce when you bake  Use 3 tablespoons of cocoa powder and 1 tablespoon of canola oil instead of a square of baking chocolate  How to increase fiber:  Eat enough high-fiber foods to get 20 to 30 grams of fiber every day  Slowly increase your fiber intake to avoid stomach cramps, gas, and other problems  · Eat 3 ounces of whole-grain foods each day  An ounce is about 1 slice of bread  Eat whole-grain breads, such as whole-wheat bread  Whole wheat, whole-wheat flour, or other whole grains should be listed as the first ingredient on the food label  Replace white flour with whole-grain flour or use half of each in recipes  Whole-grain flour is heavier than white flour, so you may have to add more yeast or baking powder  · Eat a high-fiber cereal for breakfast   Oatmeal is a good source of soluble fiber  Look for cereals that have bran or fiber in the name  Choose whole-grain products, such as brown rice, barley, and whole-wheat pasta  · Eat more beans, peas, and lentils  For example, add beans to soups or salads  Eat at least 5 cups of fruits and vegetables each day  Eat fruits and vegetables with the peel because the peel is high in fiber  © 2017 [employer] Information is for End User's use only and may not be sold, redistributed or otherwise used for commercial purposes  All illustrations and images included in CareNotes® are the copyrighted property of [employer]  or [employer]  The above information is an  only  It is not intended as medical advice for individual conditions or treatments  Talk to your doctor, nurse or pharmacist before following any medical regimen to see if it is safe and effective for you  Heart Healthy Diet   AMBULATORY CARE:   A heart healthy diet  is an eating plan low in total fat, unhealthy fats, and sodium (salt)  A heart healthy diet helps decrease your risk for heart disease and stroke   Limit the amount of fat you eat to 25% to 35% of your total daily calories  Limit sodium to less than 2,300 mg each day  Healthy fats:  Healthy fats can help improve cholesterol levels  The risk for heart disease is decreased when cholesterol levels are normal  Choose healthy fats, such as the following:  · Unsaturated fat  is found in foods such as soybean, canola, olive, corn, and safflower oils  It is also found in soft tub margarine that is made with liquid vegetable oil  · Omega-3 fat  is found in certain fish, such as salmon, tuna, and trout, and in walnuts and flaxseed  Unhealthy fats:  Unhealthy fats can cause unhealthy cholesterol levels in your blood and increase your risk of heart disease  Limit unhealthy fats, such as the following:  · Cholesterol  is found in animal foods, such as eggs and lobster, and in dairy products made from whole milk  Limit cholesterol to less than 300 milligrams (mg) each day  You may need to limit cholesterol to 200 mg each day if you have heart disease  · Saturated fat  is found in meats, such as almendarez and hamburger  It is also found in chicken or turkey skin, whole milk, and butter  Limit saturated fat to less than 7% of your total daily calories  Limit saturated fat to less than 6% if you have heart disease or are at increased risk for it  · Trans fat  is found in packaged foods, such as potato chips and cookies  It is also in hard margarine, some fried foods, and shortening  Avoid trans fats as much as possible    Heart healthy foods and drinks to include:  Ask your dietitian or healthcare provider how many servings to have from each of the following food groups:  · Grains:      ¨ Whole-wheat breads, cereals, and pastas, and brown rice    ¨ Low-fat, low-sodium crackers and chips    · Vegetables:      ¨ Broccoli, green beans, green peas, and spinach    ¨ Collards, kale, and lima beans    ¨ Carrots, sweet potatoes, tomatoes, and peppers    ¨ Canned vegetables with no salt added    · Fruits:      ¨ Bananas, peaches, pears, and pineapple    ¨ Grapes, raisins, and dates    ¨ Oranges, tangerines, grapefruit, orange juice, and grapefruit juice    ¨ Apricots, mangoes, melons, and papaya    ¨ Raspberries and strawberries    ¨ Canned fruit with no added sugar    · Low-fat dairy products:      ¨ Nonfat (skim) milk, 1% milk, and low-fat almond, cashew, or soy milks fortified with calcium    ¨ Low-fat cheese, regular or frozen yogurt, and cottage cheese    · Meats and proteins , such as lean cuts of beef and pork (loin, leg, round), skinless chicken and turkey, legumes, soy products, egg whites, and nuts  Foods and drinks to limit or avoid:  Ask your dietitian or healthcare provider about these and other foods that are high in unhealthy fat, sodium, and sugar:  · Snack or packaged foods , such as frozen dinners, cookies, macaroni and cheese, and cereals with more than 300 mg of sodium per serving    · Canned or dry mixes  for cakes, soups, sauces, or gravies    · Vegetables with added sodium , such as instant potatoes, vegetables with added sauces, or regular canned vegetables    · Other foods high in sodium , such as ketchup, barbecue sauce, salad dressing, pickles, olives, soy sauce, and miso    · High-fat dairy foods  such as whole or 2% milk, cream cheese, or sour cream, and cheeses     · High-fat protein foods  such as high-fat cuts of beef (T-bone steaks, ribs), chicken or turkey with skin, and organ meats, such as liver    · Cured or smoked meats , such as hot dogs, almendarez, and sausage    · Unhealthy fats and oils , such as butter, stick margarine, shortening, and cooking oils such as coconut or palm oil    · Food and drinks high in sugar , such as soft drinks (soda), sports drinks, sweetened tea, candy, cake, cookies, pies, and doughnuts  Other diet guidelines to follow:   · Eat more foods containing omega-3 fats  Eat fish high in omega-3 fats at least 2 times a week  · Limit alcohol    Too much alcohol can damage your heart and raise your blood pressure  Women should limit alcohol to 1 drink a day  Men should limit alcohol to 2 drinks a day  A drink of alcohol is 12 ounces of beer, 5 ounces of wine, or 1½ ounces of liquor  · Choose low-sodium foods  High-sodium foods can lead to high blood pressure  Add little or no salt to food you prepare  Use herbs and spices in place of salt  · Eat more fiber  to help lower cholesterol levels  Eat at least 5 servings of fruits and vegetables each day  Eat 3 ounces of whole-grain foods each day  Legumes (beans) are also a good source of fiber  Lifestyle guidelines:   · Do not smoke  Nicotine and other chemicals in cigarettes and cigars can cause lung and heart damage  Ask your healthcare provider for information if you currently smoke and need help to quit  E-cigarettes or smokeless tobacco still contain nicotine  Talk to your healthcare provider before you use these products  · Exercise regularly  to help you maintain a healthy weight and improve your blood pressure and cholesterol levels  Ask your healthcare provider about the best exercise plan for you  Do not start an exercise program without asking your healthcare provider  Follow up with your healthcare provider as directed:  Write down your questions so you remember to ask them during your visits  © 2017 [facility] [facility] Information is for End User's use only and may not be sold, redistributed or otherwise used for commercial purposes  All illustrations and images included in CareNotes® are the copyrighted property of [employer]  or [employer]  The above information is an  only  It is not intended as medical advice for individual conditions or treatments  Talk to your doctor, nurse or pharmacist before following any medical regimen to see if it is safe and effective for you  Calorie Counting Diet   WHAT YOU NEED TO KNOW:   What is a calorie counting diet?   It is a meal plan based on counting calories each day to reach a healthy body weight  You will need to eat fewer calories if you are trying to lose weight  Weight loss may decrease your risk for certain health problems or improve your health if you have health problems  Some of these health problems include heart disease, high blood pressure, and diabetes  What foods should I avoid? Your dietitian will tell you if you need to avoid certain foods based on your body weight and health condition  You may need to avoid high-fat foods if you are at risk for or have heart disease  You may need to eat fewer foods from the breads and starches food group if you have diabetes  How many calories are in foods? The following is a list of foods and drinks with the approximate number of calories in each  Check the food label to find the exact number of calories  A dietitian can tell you how many calories you should have from each food group each day    · Carbohydrate:      ¨ ½ of a 3-inch bagel, 1 slice of bread, or ½ of a hamburger bun or hot dog bun (80)    ¨ 1 (8-inch) flour tortilla or ½ cup of cooked rice (100)    ¨ 1 (6-inch) corn tortilla (80)    ¨ 1 (6-inch) pancake or 1 cup of bran flakes cereal (110)    ¨ ½ cup of cooked cereal (80)    ¨ ½ cup of cooked pasta (85)    ¨ 1 ounce of pretzels (100)    ¨ 3 cups of air-popped popcorn without butter or oil (80)    · Dairy:      ¨ 1 cup of skim or 1% milk (90)    ¨ 1 cup of 2% milk (120)    ¨ 1 cup of whole milk (160)    ¨ 1 cup of 2% chocolate milk (220)    ¨ 1 ounce of low-fat cheese with 3 grams of fat per ounce (70)    ¨ 1 ounce of cheddar cheese (114)    ¨ ½ cup of 1% fat cottage cheese (80)    ¨ 1 cup of plain or sugar-free, fat-free yogurt (90)    · Protein foods:      ¨ 3 ounces of fish (not breaded or fried) (95)    ¨ 3 ounces of breaded, fried fish (195)    ¨ ¾ cup of tuna canned in water (105)    ¨ 3 ounces of chicken breast without skin (105)    ¨ 1 fried chicken breast with skin (350)    ¨ ¼ cup of fat free egg substitute (40)    ¨ 1 large egg (75)    ¨ 3 ounces of lean beef or pork (165)    ¨ 3 ounces of fried pork chop or ham (185)    ¨ ½ cup of cooked dried beans, such as kidney, mike, lentils, or navy (115)    ¨ 3 ounces of bologna or lunch meat (225)    ¨ 2 links of breakfast sausage (140)    · Vegetables:      ¨ ½ cup of sliced mushrooms (10)    ¨ 1 cup of salad greens, such as lettuce, spinach, or luis (15)    ¨ ½ cup of steamed asparagus (20)    ¨ ½ cup of cooked summer squash, zucchini squash, or green or wax beans (25)    ¨ 1 cup of broccoli or cauliflower florets, or 1 medium tomato (25)    ¨ 1 large raw carrot or ½ cup of cooked carrots (40)    ¨ ? of a medium cucumber or 1 stalk of celery (5)    ¨ 1 small baked potato (160)    ¨ 1 cup of breaded, fried vegetables (230)    · Fruit:      ¨ 1 (6-inch) banana (55)     ¨ ½ of a 4-inch grapefruit (55)    ¨ 15 grapes (60)    ¨ 1 medium orange or apple (70)    ¨ 1 large peach (65)    ¨ 1 cup of fresh pineapple chunks (75)    ¨ 1 cup of melon cubes (50)    ¨ 1¼ cups of whole strawberries (45)    ¨ ½ cup of fruit canned in juice (55)    ¨ ½ cup of fruit canned in heavy syrup (110)    ¨ ?  cup of raisins (130)    ¨ ½ cup of unsweetened fruit juice (60)    ¨ ½ cup of grape, cranberry, or prune juice (90)    · Fat:      ¨ 10 peanuts or 2 teaspoons of peanut butter (55)    ¨ 2 tablespoons of avocado or 1 tablespoon of regular salad dressing (45)    ¨ 2 slices of almendarez (90)    ¨ 1 teaspoon of oil, such as safflower, canola, corn, or olive oil (45)    ¨ 2 teaspoons of low-fat margarine, or 1 tablespoon of low-fat mayonnaise (50)    ¨ 1 teaspoon of regular margarine (40)    ¨ 1 tablespoon of regular mayonnaise (135)    ¨ 1 tablespoon of cream cheese or 2 tablespoons of low-fat cream cheese (45)    ¨ 2 tablespoons of vegetable shortening (215)    · Dessert and sweets:      ¨ 8 animal crackers or 5 vanilla wafers (80)    ¨ 1 frozen fruit juice bar (80)    ¨ ½ cup of ice milk or low-fat frozen yogurt (90)    ¨ ½ cup of sherbet or sorbet (125)    ¨ ½ cup of sugar-free pudding or custard (60)    ¨ ½ cup of ice cream (140)    ¨ ½ cup of pudding or custard (175)    ¨ 1 (2-inch) square chocolate brownie (185)    · Combination foods:      ¨ Bean burrito made with an 8-inch tortilla, without cheese (275)    ¨ Chicken breast sandwich with lettuce and tomato (325)    ¨ 1 cup of chicken noodle soup (60)    ¨ 1 beef taco (175)    ¨ Regular hamburger with lettuce and tomato (310)    ¨ Regular cheeseburger with lettuce and tomato (410)     ¨ ¼ of a 12-inch cheese pizza (280)    ¨ Fried fish sandwich with lettuce and tomato (425)    ¨ Hot dog and bun (275)    ¨ 1½ cups of macaroni and cheese (310)    ¨ Taco salad with a fried tortilla shell (870)    · Low-calorie foods:      ¨ 1 tablespoon of ketchup or 1 tablespoon of fat free sour cream (15)    ¨ 1 teaspoon of mustard (5)    ¨ ¼ cup of salsa (20)    ¨ 1 large dill pickle (15)    ¨ 1 tablespoon of fat free salad dressing (10)    ¨ 2 teaspoons of low-sugar, light jam or jelly, or 1 tablespoon of sugar-free syrup (15)    ¨ 1 sugar-free popsicle (15)    ¨ 1 cup of club soda, seltzer water, or diet soda (0)  CARE AGREEMENT:   You have the right to help plan your care  Discuss treatment options with your caregivers to decide what care you want to receive  You always have the right to refuse treatment  The above information is an  only  It is not intended as medical advice for individual conditions or treatments  Talk to your doctor, nurse or pharmacist before following any medical regimen to see if it is safe and effective for you  © 2017 [employer] [employer] Information is for End User's use only and may not be sold, redistributed or otherwise used for commercial purposes   All illustrations and images included in CareNotes® are the copyrighted property of [employer]  or [employer]

## 2019-01-25 NOTE — ASSESSMENT & PLAN NOTE
Patient's blood pressure is still moderately elevated today we with her visit to the office  We did discuss with the patient that 1 of the most important characteristics of reducing her blood pressure at this point time would be to work harder at her diet weight loss program   Patient also states that there is a lot of stress at work and this also has been making it very difficult for her  She plans to be seen in the office with her next visit before she goes to work and hopefully will show better control of her blood pressure

## 2019-01-25 NOTE — ASSESSMENT & PLAN NOTE
With the patient's BMI she is considered morbidly obese  We did discuss with the patient today the importance of watching her diet, reducing fats and cholesterol in her diet, losing weight and being activity involved with weight loss program   She states having no medical insurance she is not a candidate for bariatric evaluation and/or surgery

## 2019-01-29 DIAGNOSIS — I10 ESSENTIAL HYPERTENSION: ICD-10-CM

## 2019-01-29 DIAGNOSIS — E03.9 ACQUIRED HYPOTHYROIDISM: ICD-10-CM

## 2019-01-29 RX ORDER — LEVOTHYROXINE SODIUM 0.2 MG/1
TABLET ORAL
Qty: 30 TABLET | Refills: 0 | Status: SHIPPED | OUTPATIENT
Start: 2019-01-29 | End: 2019-07-26 | Stop reason: SDUPTHER

## 2019-01-29 RX ORDER — AMLODIPINE BESYLATE 2.5 MG/1
TABLET ORAL
Qty: 30 TABLET | Refills: 0 | Status: SHIPPED | OUTPATIENT
Start: 2019-01-29 | End: 2019-07-26 | Stop reason: SDUPTHER

## 2019-05-06 ENCOUNTER — TELEPHONE (OUTPATIENT)
Dept: INTERNAL MEDICINE CLINIC | Facility: CLINIC | Age: 64
End: 2019-05-06

## 2019-05-06 DIAGNOSIS — F51.02 ADJUSTMENT INSOMNIA: Primary | ICD-10-CM

## 2019-05-06 RX ORDER — ALPRAZOLAM 0.25 MG/1
0.25 TABLET ORAL
Qty: 30 TABLET | Refills: 1 | Status: SHIPPED | OUTPATIENT
Start: 2019-05-06 | End: 2019-11-19 | Stop reason: SDUPTHER

## 2019-07-15 ENCOUNTER — TELEPHONE (OUTPATIENT)
Dept: INTERNAL MEDICINE CLINIC | Facility: CLINIC | Age: 64
End: 2019-07-15

## 2019-07-15 DIAGNOSIS — H10.31 ACUTE BACTERIAL CONJUNCTIVITIS OF RIGHT EYE: Primary | ICD-10-CM

## 2019-07-15 RX ORDER — TOBRAMYCIN AND DEXAMETHASONE 3; 1 MG/ML; MG/ML
1 SUSPENSION/ DROPS OPHTHALMIC
Qty: 5 ML | Refills: 0 | Status: SHIPPED | OUTPATIENT
Start: 2019-07-15 | End: 2020-02-17 | Stop reason: ALTCHOICE

## 2019-07-15 NOTE — TELEPHONE ENCOUNTER
Patient states she has conjunctivitis in her Left eye and can not get away from work and wanted to know if you could call in an ABX for her      She does have an appointment to come in and see you in 2 weeks

## 2019-07-26 ENCOUNTER — OFFICE VISIT (OUTPATIENT)
Dept: INTERNAL MEDICINE CLINIC | Facility: CLINIC | Age: 64
End: 2019-07-26

## 2019-07-26 VITALS
TEMPERATURE: 98.3 F | BODY MASS INDEX: 45.94 KG/M2 | OXYGEN SATURATION: 98 % | HEIGHT: 60 IN | DIASTOLIC BLOOD PRESSURE: 76 MMHG | HEART RATE: 88 BPM | WEIGHT: 234 LBS | SYSTOLIC BLOOD PRESSURE: 148 MMHG

## 2019-07-26 DIAGNOSIS — E78.00 PURE HYPERCHOLESTEROLEMIA: Primary | ICD-10-CM

## 2019-07-26 DIAGNOSIS — E55.9 VITAMIN D DEFICIENCY: ICD-10-CM

## 2019-07-26 DIAGNOSIS — E03.9 ACQUIRED HYPOTHYROIDISM: ICD-10-CM

## 2019-07-26 DIAGNOSIS — E66.01 MORBID OBESITY DUE TO EXCESS CALORIES (HCC): ICD-10-CM

## 2019-07-26 DIAGNOSIS — I10 ESSENTIAL HYPERTENSION: ICD-10-CM

## 2019-07-26 DIAGNOSIS — R73.9 HYPERGLYCEMIA: ICD-10-CM

## 2019-07-26 PROCEDURE — 99213 OFFICE O/P EST LOW 20 MIN: CPT | Performed by: INTERNAL MEDICINE

## 2019-07-26 RX ORDER — ROSUVASTATIN CALCIUM 5 MG/1
5 TABLET, COATED ORAL DAILY
Qty: 30 TABLET | Refills: 5 | Status: SHIPPED | OUTPATIENT
Start: 2019-07-26 | End: 2020-02-27 | Stop reason: SDUPTHER

## 2019-07-26 RX ORDER — LEVOTHYROXINE SODIUM 0.2 MG/1
200 TABLET ORAL
Qty: 30 TABLET | Refills: 5 | Status: SHIPPED | OUTPATIENT
Start: 2019-07-26 | End: 2020-02-07 | Stop reason: SDUPTHER

## 2019-07-26 RX ORDER — VALSARTAN AND HYDROCHLOROTHIAZIDE 320; 12.5 MG/1; MG/1
1 TABLET, FILM COATED ORAL DAILY
Qty: 30 TABLET | Refills: 5 | Status: SHIPPED | OUTPATIENT
Start: 2019-07-26 | End: 2020-01-17

## 2019-07-26 RX ORDER — AMLODIPINE BESYLATE 5 MG/1
5 TABLET ORAL DAILY
Qty: 30 TABLET | Refills: 5 | Status: SHIPPED | OUTPATIENT
Start: 2019-07-26 | End: 2019-11-18

## 2019-07-26 NOTE — ASSESSMENT & PLAN NOTE
Patient remains vitamin-D supplements    We will check a vitamin-D level, modify treatment if necessary

## 2019-07-26 NOTE — ASSESSMENT & PLAN NOTE
Patient has not had thyroid function studies performed in extended period of time  Patient was given a slip to check on TSH reflex to T4  She will remain on levothyroxine 200 mcg daily

## 2019-07-26 NOTE — ASSESSMENT & PLAN NOTE
Patient has history of elevated blood sugar readings in the past   She states that she has found a new lab order to have test done at a more reasonable cost and hopefully will have a blood sugar checked in the near future  We will call the patient if she needs to initiate treatment or evidence of diabetes  Again we discussed diet, decreasing her caloric intake, avoiding concentrated sweets and simple carbohydrates

## 2019-07-26 NOTE — ASSESSMENT & PLAN NOTE
Patient does have a history of white coat syndrome  Blood pressure is mildly elevated today  Patient is compliant with her medication  We will check on her renal function, consider modification of treatment in the future if her blood pressure remains elevated

## 2019-07-26 NOTE — PROGRESS NOTES
Assessment/Plan:    Hyperlipemia  We did discuss the labs the patient had performed in December of last year  She is finally agreeable to restarting a statin  We place the patient on a low dose of Crestor at 5 mg a day  Patient was told to call if any adverse effects from the medication  We will check a lipid profile when she returns to the office in 6 months  Besides this we really did discuss at length today the importance of diet, working to reduce her intake of fats and cholesterol with her diet  Hyperglycemia  Patient has history of elevated blood sugar readings in the past   She states that she has found a new lab order to have test done at a more reasonable cost and hopefully will have a blood sugar checked in the near future  We will call the patient if she needs to initiate treatment or evidence of diabetes  Again we discussed diet, decreasing her caloric intake, avoiding concentrated sweets and simple carbohydrates  Essential hypertension  Patient does have a history of white coat syndrome  Blood pressure is mildly elevated today  Patient is compliant with her medication  We will check on her renal function, consider modification of treatment in the future if her blood pressure remains elevated  Hypothyroidism  Patient has not had thyroid function studies performed in extended period of time  Patient was given a slip to check on TSH reflex to T4  She will remain on levothyroxine 200 mcg daily  Vitamin D deficiency  Patient remains vitamin-D supplements  We will check a vitamin-D level, modify treatment if necessary       Diagnoses and all orders for this visit:    Pure hypercholesterolemia  -     rosuvastatin (CRESTOR) 5 mg tablet; Take 1 tablet (5 mg total) by mouth daily  -     Lipid panel; Future    Acquired hypothyroidism  -     levothyroxine 200 mcg tablet;  Take 1 tablet (200 mcg total) by mouth daily in the early morning  -     TSH, 3rd generation with Free T4 reflex; Future    Essential hypertension  -     valsartan-hydrochlorothiazide (DIOVAN-HCT) 320-12 5 MG per tablet; Take 1 tablet by mouth daily  -     amLODIPine (NORVASC) 5 mg tablet; Take 1 tablet (5 mg total) by mouth daily  -     Basic metabolic panel; Future    Morbid obesity due to excess calories (HCC)    Hyperglycemia  -     Basic metabolic panel; Future    Vitamin D deficiency  -     Vitamin D 25 hydroxy; Future          Subjective:      Patient ID: Kushal Mahan is a 62 y o  female  Patient is a 61-year-old female with a history of multiple medical problems as outlined previously  Patient is here today for routine follow-up after 6 month period of time  Patient states in general she is doing about the same and has no new complaints or concerns  Patient also relates that she has not been watching her diet as closely nor getting any regular exercise and as noted her weight is up from her last visit  Patient still does not have any medical insurance the pace for lab testing and no labs were performed prior to visit today but we did give the patient a slip to check on labs in the future  The following portions of the patient's history were reviewed and updated as appropriate:   She  has no past medical history on file  She   Patient Active Problem List    Diagnosis Date Noted    Acute bacterial conjunctivitis of right eye 07/15/2019    Morbid obesity due to excess calories ([facility] [address] ) 01/25/2019    Hyperglycemia 08/23/2016    Hyperlipemia 08/23/2016    Hypothyroidism 05/27/2016    Vitamin D deficiency 05/27/2016    Essential hypertension 05/27/2015     She  has no past surgical history on file  Her family history includes Heart disease in her father  She  reports that she has quit smoking  She has never used smokeless tobacco  Her alcohol and drug histories are not on file    Current Outpatient Medications   Medication Sig Dispense Refill    ALPRAZolam (XANAX) 0 25 mg tablet Take 1 tablet (0 25 mg total) by mouth daily at bedtime as needed for sleep 30 tablet 1    amLODIPine (NORVASC) 5 mg tablet Take 1 tablet (5 mg total) by mouth daily 30 tablet 5    levothyroxine 200 mcg tablet Take 1 tablet (200 mcg total) by mouth daily in the early morning 30 tablet 5    valsartan-hydrochlorothiazide (DIOVAN-HCT) 320-12 5 MG per tablet Take 1 tablet by mouth daily 30 tablet 5    rosuvastatin (CRESTOR) 5 mg tablet Take 1 tablet (5 mg total) by mouth daily 30 tablet 5    tobramycin-dexamethasone (TOBRADEX) ophthalmic suspension Apply 1 drop to eye every 4 (four) hours while awake for 5 days 5 mL 0     No current facility-administered medications for this visit  Current Outpatient Medications on File Prior to Visit   Medication Sig    ALPRAZolam (XANAX) 0 25 mg tablet Take 1 tablet (0 25 mg total) by mouth daily at bedtime as needed for sleep    [DISCONTINUED] amLODIPine (NORVASC) 5 mg tablet Take 1 tablet (5 mg total) by mouth daily    [DISCONTINUED] levothyroxine 200 mcg tablet Take 1 tablet (200 mcg total) by mouth daily in the early morning    [DISCONTINUED] valsartan-hydrochlorothiazide (DIOVAN-HCT) 320-12 5 MG per tablet Take 1 tablet by mouth daily    tobramycin-dexamethasone (TOBRADEX) ophthalmic suspension Apply 1 drop to eye every 4 (four) hours while awake for 5 days    [DISCONTINUED] amLODIPine (NORVASC) 2 5 mg tablet TAKE 1 TABLET BY MOUTH EVERY DAY    [DISCONTINUED] amLODIPine (NORVASC) 2 5 mg tablet TAKE 1 TABLET BY MOUTH EVERY DAY    [DISCONTINUED] levothyroxine 200 mcg tablet TAKE 1 TABLET BY MOUTH EVERY DAY    [DISCONTINUED] levothyroxine 200 mcg tablet TAKE 1 TABLET BY MOUTH EVERY DAY    [DISCONTINUED] valsartan-hydrochlorothiazide (DIOVAN-HCT) 320-12 5 MG per tablet TAKE 1 TABLET BY MOUTH EVERY DAY     No current facility-administered medications on file prior to visit  She has No Known Allergies       Review of Systems   Constitutional: Negative  HENT: Negative      Eyes: Positive for visual disturbance (Recent episode of conjunctivitis  Patient states that she is improving and back to normal)  Negative for photophobia, pain, discharge, redness and itching  Respiratory: Negative  Cardiovascular: Negative  Gastrointestinal: Negative  Endocrine: Negative  Genitourinary: Negative  Musculoskeletal: Negative  Skin: Negative  Allergic/Immunologic: Negative  Neurological: Negative  Hematological: Negative  Psychiatric/Behavioral: Negative  Objective:      /76   Pulse 88   Temp 98 3 °F (36 8 °C)   Ht 5' (1 524 m)   Wt 106 kg (234 lb)   SpO2 98%   BMI 45 70 kg/m²          Physical Exam   Constitutional: She is oriented to person, place, and time  She appears well-developed and well-nourished  No distress  Pleasant, articulate, overweight 61-year-old female who is awake alert in no acute distress and oriented x3   HENT:   Head: Normocephalic and atraumatic  Right Ear: External ear normal    Left Ear: External ear normal    Nose: Nose normal    Mouth/Throat: Oropharynx is clear and moist  No oropharyngeal exudate  Some crowding of her oral airway   Eyes: Pupils are equal, round, and reactive to light  Conjunctivae and EOM are normal  Right eye exhibits no discharge  Left eye exhibits no discharge  No scleral icterus  Neck: Normal range of motion  Neck supple  No JVD present  No tracheal deviation present  No thyromegaly present  Cardiovascular: Normal rate, regular rhythm, normal heart sounds and intact distal pulses  Exam reveals no gallop and no friction rub  No murmur heard  Pulmonary/Chest: Effort normal and breath sounds normal  No stridor  No respiratory distress  She has no wheezes  She has no rales  She exhibits no tenderness  Abdominal: Soft  Bowel sounds are normal  She exhibits no distension and no mass  There is no tenderness  There is no rebound and no guarding  No hernia     Obese   Musculoskeletal: Normal range of motion  She exhibits no edema or deformity  Lymphadenopathy:     She has no cervical adenopathy  Neurological: She is alert and oriented to person, place, and time  She displays normal reflexes  No cranial nerve deficit or sensory deficit  She exhibits normal muscle tone  Coordination normal    Skin: Skin is warm and dry  Capillary refill takes less than 2 seconds  No rash noted  She is not diaphoretic  No erythema  Psychiatric: She has a normal mood and affect  Her behavior is normal  Thought content normal    Nursing note and vitals reviewed

## 2019-07-26 NOTE — ASSESSMENT & PLAN NOTE
We did discuss the labs the patient had performed in December of last year  She is finally agreeable to restarting a statin  We place the patient on a low dose of Crestor at 5 mg a day  Patient was told to call if any adverse effects from the medication  We will check a lipid profile when she returns to the office in 6 months  Besides this we really did discuss at length today the importance of diet, working to reduce her intake of fats and cholesterol with her diet

## 2019-11-01 ENCOUNTER — TELEPHONE (OUTPATIENT)
Dept: INTERNAL MEDICINE CLINIC | Facility: CLINIC | Age: 64
End: 2019-11-01

## 2019-11-01 NOTE — TELEPHONE ENCOUNTER
Patient's place of work called to notify the doctor that the patient passed out today during work and they think she may have had a seizure   They are waiting for the crew to take her to the hospital

## 2019-11-14 ENCOUNTER — TRANSITIONAL CARE MANAGEMENT (OUTPATIENT)
Dept: INTERNAL MEDICINE CLINIC | Facility: CLINIC | Age: 64
End: 2019-11-14

## 2019-11-18 ENCOUNTER — OFFICE VISIT (OUTPATIENT)
Dept: INTERNAL MEDICINE CLINIC | Facility: CLINIC | Age: 64
End: 2019-11-18

## 2019-11-18 VITALS
HEIGHT: 60 IN | OXYGEN SATURATION: 98 % | TEMPERATURE: 99.1 F | DIASTOLIC BLOOD PRESSURE: 68 MMHG | SYSTOLIC BLOOD PRESSURE: 128 MMHG | HEART RATE: 88 BPM | WEIGHT: 220.2 LBS | BODY MASS INDEX: 43.23 KG/M2

## 2019-11-18 DIAGNOSIS — I10 ESSENTIAL HYPERTENSION: Primary | ICD-10-CM

## 2019-11-18 DIAGNOSIS — G40.909 SEIZURE DISORDER (HCC): ICD-10-CM

## 2019-11-18 DIAGNOSIS — E03.9 ACQUIRED HYPOTHYROIDISM: ICD-10-CM

## 2019-11-18 DIAGNOSIS — D32.0 MENINGIOMA, CEREBRAL (HCC): ICD-10-CM

## 2019-11-18 DIAGNOSIS — R73.9 HYPERGLYCEMIA: ICD-10-CM

## 2019-11-18 PROCEDURE — 99495 TRANSJ CARE MGMT MOD F2F 14D: CPT | Performed by: INTERNAL MEDICINE

## 2019-11-18 RX ORDER — DEXAMETHASONE 2 MG/1
TABLET ORAL
COMMUNITY
Start: 2019-11-13 | End: 2019-11-18

## 2019-11-18 RX ORDER — AMLODIPINE BESYLATE 10 MG/1
10 TABLET ORAL DAILY
Qty: 30 TABLET | Refills: 5 | Status: SHIPPED | OUTPATIENT
Start: 2019-11-18 | End: 2020-05-23 | Stop reason: SDUPTHER

## 2019-11-18 RX ORDER — ACETAMINOPHEN 500 MG
500 TABLET ORAL EVERY 6 HOURS PRN
COMMUNITY
Start: 2019-11-13 | End: 2019-11-23

## 2019-11-18 RX ORDER — FAMOTIDINE 20 MG/1
20 TABLET, FILM COATED ORAL 2 TIMES DAILY
COMMUNITY
Start: 2019-11-13 | End: 2019-11-19 | Stop reason: SDUPTHER

## 2019-11-18 RX ORDER — PHENYTOIN SODIUM 100 MG/1
100 CAPSULE, EXTENDED RELEASE ORAL 3 TIMES DAILY
COMMUNITY
Start: 2019-11-13 | End: 2020-02-17 | Stop reason: ALTCHOICE

## 2019-11-18 RX ORDER — CETIRIZINE HYDROCHLORIDE 10 MG/1
TABLET ORAL
Refills: 0 | COMMUNITY
Start: 2019-11-13 | End: 2020-04-01 | Stop reason: SDUPTHER

## 2019-11-18 NOTE — ASSESSMENT & PLAN NOTE
Patient's initial presentation to the hospital was a generalized seizure disorder  Patient required intensive care, intubated  initially until the seizure activity was abated  Patient is now on Dilantin and no further seizure activity    She does have a follow-up with Neurology

## 2019-11-18 NOTE — ASSESSMENT & PLAN NOTE
Patient is here for transition of care visit  Patient was admitted to the hospital with acute seizure activity  Found to have on initial evaluation but appeared to be a meningioma to the brain  She does have a longstanding history of this particular abnormality but had refused resection in the past   Patient was stabilized, placed on a ventilator and seizure activity was controlled  The decision was made to have resection of the meningioma that was most likely the source of the seizure activity  Patient underwent successful craniotomy, surgical procedure, resection of the meningioma  Tolerated procedures well  Patient then went for neurosurgical rehab after stabilized  Has since then been discharged to home  Changes in medication included placing the patient on Decadron for the swelling post surgically  Placing her also on Dilantin 100 mg 3 times a day  Patient is had no further seizure activity  Has a consequence of surgical resection of the meningioma in the left she has had some weakness to the right side, some problems with speech, expressive aphasia which is improving  Because of uncontrolled blood pressure readings during her hospitalization patient was also placed on an increased dose of amlodipine to 10 mg daily  Patient states her appetite is improving  She does have follow-up visits to be seen by the neurosurgeon, Neurology, appointments for physical therapy, occupational therapy and speech therapy  Patient underwent full physical today in the office  Does have some right-sided weakness but even since I talked to the patient a few days ago her speech is improved  Her sister is staying with her until after Thanksgiving at the very least   Because of seizure activity patient not allowed to drive for at least 6 months until we are sure that she is neurologically stable

## 2019-11-18 NOTE — PROGRESS NOTES
Assessment/Plan:  TCM Call (since 10/18/2019)     Date and time call was made  11/14/2019  3:52 PM    Hospital care reviewed  Records reviewed    Patient was hospitialized at  [facility]    Date of Admission  11/01/19    Date of discharge  11/13/19    Diagnosis  Meningioma    Disposition  Home    Current Symptoms  None      TCM Call (since 10/18/2019)     Post hospital issues  None    Scheduled for follow up? Yes    I have advised the patient to call PCP with any new or worsening symptoms  Merced Fabian    Counseling  Patient    Comments  Appointment scheduled for 11/18/2019          Meningioma, cerebral [facility])  Patient is here for transition of care visit  Patient was admitted to the hospital with acute seizure activity  Found to have on initial evaluation but appeared to be a meningioma to the brain  She does have a longstanding history of this particular abnormality but had refused resection in the past   Patient was stabilized, placed on a ventilator and seizure activity was controlled  The decision was made to have resection of the meningioma that was most likely the source of the seizure activity  Patient underwent successful craniotomy, surgical procedure, resection of the meningioma  Tolerated procedures well  Patient then went for neurosurgical rehab after stabilized  Has since then been discharged to home  Changes in medication included placing the patient on Decadron for the swelling post surgically  Placing her also on Dilantin 100 mg 3 times a day  Patient is had no further seizure activity  Has a consequence of surgical resection of the meningioma in the left she has had some weakness to the right side, some problems with speech, expressive aphasia which is improving  Because of uncontrolled blood pressure readings during her hospitalization patient was also placed on an increased dose of amlodipine to 10 mg daily  Patient states her appetite is improving    She does have follow-up visits to be seen by the neurosurgeon, Neurology, appointments for physical therapy, occupational therapy and speech therapy  Patient underwent full physical today in the office  Does have some right-sided weakness but even since I talked to the patient a few days ago her speech is improved  Her sister is staying with her until after Thanksgiving at the very least   Because of seizure activity patient not allowed to drive for at least 6 months until we are sure that she is neurologically stable  Essential hypertension  As mentioned patient's blood pressure was slightly elevated during hospitalization  The decision was made to discharge to increase her Norvasc to 10 mg daily and to continue with the valsartan and hydrochlorothiazide  Patient's blood pressure today was slightly elevated initially but did come down to an acceptable range once the patient was allowed to relax  We will continue present medication  Hypothyroidism  Thyroid function was checked while she was hospitalized  This is been stable  Seizure disorder [facility])  Patient's initial presentation to the hospital was a generalized seizure disorder  Patient required intensive care, intubated  initially until the seizure activity was abated  Patient is now on Dilantin and no further seizure activity  She does have a follow-up with Neurology    Hyperglycemia  During rehab and prior to discharge patient was placed on Decadron for the swelling in the brain tissue postsurgically  Patient has finished the Decadron as of today  Does have follow-up studies to be performed to make sure there is stability  Diagnoses and all orders for this visit:    Essential hypertension  -     amLODIPine (NORVASC) 10 mg tablet; Take 1 tablet (10 mg total) by mouth daily    Acquired hypothyroidism    Meningioma, cerebral (HCC)    Seizure disorder (HCC)    Hyperglycemia    Other orders  -     phenytoin (DILANTIN) 100 mg ER capsule;  Take 100 mg by mouth Three times a day  -     famotidine (PEPCID) 20 mg tablet; Take 20 mg by mouth 2 (two) times a day  -     cetirizine (ZyrTEC) 10 mg tablet; TK 1 T PO QD  -     acetaminophen (TYLENOL) 500 mg tablet; Take 500 mg by mouth every 6 (six) hours as needed  -     dexamethasone (DECADRON) 2 mg tablet; Take 2mg PO every 12 hours x 3 days, then decrease to 2mg daily x 2 days then stop  Subjective:      Patient ID: Lynne Rainey is a 62 y o  female  Patient is here today for transition of care visit  Patient had acute hospitalization, generalized seizure activity  Evaluation showed a meningioma to the brain on the left  This was unknown finding with this particular patient to had refused surgical resection in the past   Because of the seizure activity with the meningioma growing in size and affecting brain function the decision was made to resect the tumor  Patient did undergo surgical procedure and tolerated well  Did have some residual right-sided weakness and some speech problems with expression as aphasia postprocedure but is slowly improving  Patient was placed on Decadron on discharge which she has finished today  She remains on Dilantin  The following portions of the patient's history were reviewed and updated as appropriate: She  has no past medical history on file  She   Patient Active Problem List    Diagnosis Date Noted    M[facility]oma[facility] cerebral ([facility] ) 11/18/2019    [facility] 11/18/2019    Acute bacterial conjunctivitis of right eye 07/15/2019    Morbid obesity due to excess calories ([facility] [address] ) 01/25/2019    Hyperglycemia 08/23/2016    Hyperlipemia 08/23/2016    Hypothyroidism 05/27/2016    Vitamin D deficiency 05/27/2016    Essential hypertension 05/27/2015     She  has no past surgical history on file  Her family history includes Heart disease in her father  She  reports that she has quit smoking   She has never used smokeless tobacco  Her alcohol and drug histories are not on file  Current Outpatient Medications   Medication Sig Dispense Refill    acetaminophen (TYLENOL) 500 mg tablet Take 500 mg by mouth every 6 (six) hours as needed      ALPRAZolam (XANAX) 0 25 mg tablet Take 1 tablet (0 25 mg total) by mouth daily at bedtime as needed for sleep 30 tablet 1    cetirizine (ZyrTEC) 10 mg tablet TK 1 T PO QD  0    dexamethasone (DECADRON) 2 mg tablet Take 2mg PO every 12 hours x 3 days, then decrease to 2mg daily x 2 days then stop   famotidine (PEPCID) 20 mg tablet Take 20 mg by mouth 2 (two) times a day      levothyroxine 200 mcg tablet Take 1 tablet (200 mcg total) by mouth daily in the early morning 30 tablet 5    phenytoin (DILANTIN) 100 mg ER capsule Take 100 mg by mouth Three times a day      rosuvastatin (CRESTOR) 5 mg tablet Take 1 tablet (5 mg total) by mouth daily 30 tablet 5    valsartan-hydrochlorothiazide (DIOVAN-HCT) 320-12 5 MG per tablet Take 1 tablet by mouth daily 30 tablet 5    amLODIPine (NORVASC) 10 mg tablet Take 1 tablet (10 mg total) by mouth daily 30 tablet 5    tobramycin-dexamethasone (TOBRADEX) ophthalmic suspension Apply 1 drop to eye every 4 (four) hours while awake for 5 days 5 mL 0     No current facility-administered medications for this visit  Current Outpatient Medications on File Prior to Visit   Medication Sig    acetaminophen (TYLENOL) 500 mg tablet Take 500 mg by mouth every 6 (six) hours as needed    ALPRAZolam (XANAX) 0 25 mg tablet Take 1 tablet (0 25 mg total) by mouth daily at bedtime as needed for sleep    cetirizine (ZyrTEC) 10 mg tablet TK 1 T PO QD    dexamethasone (DECADRON) 2 mg tablet Take 2mg PO every 12 hours x 3 days, then decrease to 2mg daily x 2 days then stop      famotidine (PEPCID) 20 mg tablet Take 20 mg by mouth 2 (two) times a day    levothyroxine 200 mcg tablet Take 1 tablet (200 mcg total) by mouth daily in the early morning    phenytoin (DILANTIN) 100 mg ER capsule Take 100 mg by mouth Three times a day    rosuvastatin (CRESTOR) 5 mg tablet Take 1 tablet (5 mg total) by mouth daily    valsartan-hydrochlorothiazide (DIOVAN-HCT) 320-12 5 MG per tablet Take 1 tablet by mouth daily    [DISCONTINUED] amLODIPine (NORVASC) 5 mg tablet Take 1 tablet (5 mg total) by mouth daily    tobramycin-dexamethasone (TOBRADEX) ophthalmic suspension Apply 1 drop to eye every 4 (four) hours while awake for 5 days     No current facility-administered medications on file prior to visit  She has No Known Allergies       Review of Systems   Constitutional: Positive for activity change (Patient has had restrictions in activity level and will be going for rehab)  Negative for appetite change, chills, diaphoresis, fatigue, fever and unexpected weight change  HENT: Negative  Eyes: Negative  Respiratory: Negative  Cardiovascular: Negative  Gastrointestinal: Negative  Endocrine: Negative  Genitourinary: Negative  Musculoskeletal: Positive for arthralgias  Negative for back pain, gait problem, joint swelling, myalgias, neck pain and neck stiffness  Some generalized arthritic aches and pains but nothing new   Skin: Positive for wound ( patient has healing to the operative site without signs of infection)  Negative for color change, pallor and rash  Allergic/Immunologic: Negative  Neurological: Positive for seizures and weakness  Negative for dizziness, tremors, syncope, facial asymmetry, speech difficulty, light-headedness, numbness and headaches  Hematological: Negative  Psychiatric/Behavioral: Negative  Objective:      /68   Pulse 88   Temp 99 1 °F (37 3 °C)   Ht 5' (1 524 m)   Wt 99 9 kg (220 lb 3 2 oz)   SpO2 98%   BMI 43 00 kg/m²          Physical Exam   Constitutional: She is oriented to person, place, and time  She appears well-developed and well-nourished  No distress     Pleasant mildly overweight 78-year-old female who is awake alert, walking with a walker, accompanied by sister today for the appointment   HENT:   Head: Normocephalic  Right Ear: External ear normal    Left Ear: External ear normal    Nose: Nose normal    Mouth/Throat: Oropharynx is clear and moist  No oropharyngeal exudate  Surgical site to the cranium on the left   Eyes: Pupils are equal, round, and reactive to light  Conjunctivae and EOM are normal  Right eye exhibits no discharge  Left eye exhibits no discharge  No scleral icterus  Neck: Normal range of motion  Neck supple  No JVD present  No tracheal deviation present  No thyromegaly present  Cardiovascular: Normal rate, regular rhythm, normal heart sounds and intact distal pulses  Exam reveals no gallop and no friction rub  No murmur heard  Pulmonary/Chest: Effort normal and breath sounds normal  No stridor  No respiratory distress  She has no wheezes  She has no rales  She exhibits no tenderness  Slightly decreased breath sounds anteriorly and posteriorly but no rales rhonchi or wheezes could be appreciated   Abdominal: Soft  Bowel sounds are normal  She exhibits no distension and no mass  There is no tenderness  There is no rebound and no guarding  No hernia  Obese   Musculoskeletal: Normal range of motion  She exhibits no edema, tenderness or deformity  Lymphadenopathy:     She has no cervical adenopathy  Neurological: She is alert and oriented to person, place, and time  She displays abnormal reflex ( at decrease in her patellar deep tendon reflex on the right)  A cranial nerve deficit ( some speech deficit, finding words, expressive aphasia) is present  No sensory deficit  She exhibits normal muscle tone  Coordination (Problems with coordination to her right side, some right-sided weakness most noticeable to the right upper extremity, quadriceps) abnormal    Skin: Skin is warm and dry  Capillary refill takes less than 2 seconds  No rash noted  She is not diaphoretic  No erythema  No pallor     Psychiatric: She has a normal mood and affect  Her behavior is normal  Judgment and thought content normal    Nursing note and vitals reviewed

## 2019-11-18 NOTE — ASSESSMENT & PLAN NOTE
As mentioned patient's blood pressure was slightly elevated during hospitalization  The decision was made to discharge to increase her Norvasc to 10 mg daily and to continue with the valsartan and hydrochlorothiazide  Patient's blood pressure today was slightly elevated initially but did come down to an acceptable range once the patient was allowed to relax  We will continue present medication

## 2019-11-18 NOTE — ASSESSMENT & PLAN NOTE
During rehab and prior to discharge patient was placed on Decadron for the swelling in the brain tissue postsurgically  Patient has finished the Decadron as of today  Does have follow-up studies to be performed to make sure there is stability

## 2019-11-19 DIAGNOSIS — F51.02 ADJUSTMENT INSOMNIA: ICD-10-CM

## 2019-11-19 DIAGNOSIS — K29.40 ATROPHIC GASTRITIS WITHOUT HEMORRHAGE: Primary | ICD-10-CM

## 2019-11-19 RX ORDER — ALPRAZOLAM 0.25 MG/1
0.25 TABLET ORAL
Qty: 30 TABLET | Refills: 1 | Status: SHIPPED | OUTPATIENT
Start: 2019-11-19 | End: 2020-09-22 | Stop reason: SDUPTHER

## 2019-11-19 RX ORDER — FAMOTIDINE 20 MG/1
20 TABLET, FILM COATED ORAL 2 TIMES DAILY
Qty: 60 TABLET | Refills: 11 | Status: SHIPPED | OUTPATIENT
Start: 2019-11-19 | End: 2020-12-14 | Stop reason: SDUPTHER

## 2020-01-17 DIAGNOSIS — I10 ESSENTIAL HYPERTENSION: ICD-10-CM

## 2020-01-17 RX ORDER — VALSARTAN AND HYDROCHLOROTHIAZIDE 320; 12.5 MG/1; MG/1
TABLET, FILM COATED ORAL
Qty: 30 TABLET | Refills: 0 | Status: SHIPPED | OUTPATIENT
Start: 2020-01-17 | End: 2020-03-16 | Stop reason: SDUPTHER

## 2020-02-07 DIAGNOSIS — E03.9 ACQUIRED HYPOTHYROIDISM: ICD-10-CM

## 2020-02-07 RX ORDER — LEVOTHYROXINE SODIUM 0.2 MG/1
200 TABLET ORAL
Qty: 30 TABLET | Refills: 5 | Status: SHIPPED | OUTPATIENT
Start: 2020-02-07 | End: 2020-08-05 | Stop reason: SDUPTHER

## 2020-02-17 ENCOUNTER — OFFICE VISIT (OUTPATIENT)
Dept: INTERNAL MEDICINE CLINIC | Facility: CLINIC | Age: 65
End: 2020-02-17

## 2020-02-17 VITALS
WEIGHT: 224 LBS | TEMPERATURE: 98.1 F | DIASTOLIC BLOOD PRESSURE: 70 MMHG | SYSTOLIC BLOOD PRESSURE: 148 MMHG | HEART RATE: 82 BPM | HEIGHT: 60 IN | OXYGEN SATURATION: 98 % | BODY MASS INDEX: 43.98 KG/M2

## 2020-02-17 DIAGNOSIS — E66.01 MORBID OBESITY WITH BMI OF 40.0-44.9, ADULT (HCC): ICD-10-CM

## 2020-02-17 DIAGNOSIS — G40.909 SEIZURE DISORDER (HCC): ICD-10-CM

## 2020-02-17 DIAGNOSIS — I10 ESSENTIAL HYPERTENSION: Primary | ICD-10-CM

## 2020-02-17 DIAGNOSIS — D32.0 MENINGIOMA, CEREBRAL (HCC): ICD-10-CM

## 2020-02-17 PROBLEM — E66.813 CLASS 3 SEVERE OBESITY DUE TO EXCESS CALORIES WITH SERIOUS COMORBIDITY IN ADULT (HCC): Status: ACTIVE | Noted: 2020-02-17

## 2020-02-17 PROCEDURE — 99213 OFFICE O/P EST LOW 20 MIN: CPT | Performed by: INTERNAL MEDICINE

## 2020-02-17 PROCEDURE — 3077F SYST BP >= 140 MM HG: CPT | Performed by: INTERNAL MEDICINE

## 2020-02-17 PROCEDURE — 1036F TOBACCO NON-USER: CPT | Performed by: INTERNAL MEDICINE

## 2020-02-17 PROCEDURE — 3078F DIAST BP <80 MM HG: CPT | Performed by: INTERNAL MEDICINE

## 2020-02-17 PROCEDURE — 3008F BODY MASS INDEX DOCD: CPT | Performed by: INTERNAL MEDICINE

## 2020-02-17 RX ORDER — LEVETIRACETAM 250 MG/1
TABLET ORAL
COMMUNITY
Start: 2019-12-02

## 2020-02-17 NOTE — ASSESSMENT & PLAN NOTE
Patient late last year did have acute seizure disorder, secondary to meningioma status post resection  The patient still has some residual weakness to her right side but not significant  Has been on seizure medication and no further seizure activity  The patient is working part-time but hopes to go full-time in the near future  A major concern is the huge medical bills for her treatment  Patient has finished both physical therapy, occupational therapy and continues to follow-up with neuro and Neurosurgery    Hoping to get back her license in the next few months

## 2020-02-17 NOTE — ASSESSMENT & PLAN NOTE
Patient did have a slight elevation in her blood pressure reading today in the office  This was read checked in her blood pressure did come down to 140/70  Patient will continue present medication and we did discuss the importance of working at diet, weight loss    She admits that she has been more sedentary especially during the winter months and post surgically

## 2020-02-17 NOTE — ASSESSMENT & PLAN NOTE
Again were hoping to see that the patient can return back to driving after being seizure free for 6 months    His is 1 of her major goals

## 2020-02-17 NOTE — PATIENT INSTRUCTIONS
Calorie Counting Diet   WHAT YOU NEED TO KNOW:   What is a calorie counting diet? It is a meal plan based on counting calories each day to reach a healthy body weight  You will need to eat fewer calories if you are trying to lose weight  Weight loss may decrease your risk for certain health problems or improve your health if you have health problems  Some of these health problems include heart disease, high blood pressure, and diabetes  What foods should I avoid? Your dietitian will tell you if you need to avoid certain foods based on your body weight and health condition  You may need to avoid high-fat foods if you are at risk for or have heart disease  You may need to eat fewer foods from the breads and starches food group if you have diabetes  How many calories are in foods? The following is a list of foods and drinks with the approximate number of calories in each  Check the food label to find the exact number of calories  A dietitian can tell you how many calories you should have from each food group each day    · Carbohydrate:      ¨ ½ of a 3-inch bagel, 1 slice of bread, or ½ of a hamburger bun or hot dog bun (80)    ¨ 1 (8-inch) flour tortilla or ½ cup of cooked rice (100)    ¨ 1 (6-inch) corn tortilla (80)    ¨ 1 (6-inch) pancake or 1 cup of bran flakes cereal (110)    ¨ ½ cup of cooked cereal (80)    ¨ ½ cup of cooked pasta (85)    ¨ 1 ounce of pretzels (100)    ¨ 3 cups of air-popped popcorn without butter or oil (80)    · Dairy:      ¨ 1 cup of skim or 1% milk (90)    ¨ 1 cup of 2% milk (120)    ¨ 1 cup of whole milk (160)    ¨ 1 cup of 2% chocolate milk (220)    ¨ 1 ounce of low-fat cheese with 3 grams of fat per ounce (70)    ¨ 1 ounce of cheddar cheese (114)    ¨ ½ cup of 1% fat cottage cheese (80)    ¨ 1 cup of plain or sugar-free, fat-free yogurt (90)    · Protein foods:      ¨ 3 ounces of fish (not breaded or fried) (95)    ¨ 3 ounces of breaded, fried fish (195)    ¨ ¾ cup of tuna canned in water (105)    ¨ 3 ounces of chicken breast without skin (105)    ¨ 1 fried chicken breast with skin (350)    ¨ ¼ cup of fat free egg substitute (40)    ¨ 1 large egg (75)    ¨ 3 ounces of lean beef or pork (165)    ¨ 3 ounces of fried pork chop or ham (185)    ¨ ½ cup of cooked dried beans, such as kidney, mike, lentils, or navy (115)    ¨ 3 ounces of bologna or lunch meat (225)    ¨ 2 links of breakfast sausage (140)    · Vegetables:      ¨ ½ cup of sliced mushrooms (10)    ¨ 1 cup of salad greens, such as lettuce, spinach, or luis (15)    ¨ ½ cup of steamed asparagus (20)    ¨ ½ cup of cooked summer squash, zucchini squash, or green or wax beans (25)    ¨ 1 cup of broccoli or cauliflower florets, or 1 medium tomato (25)    ¨ 1 large raw carrot or ½ cup of cooked carrots (40)    ¨ ? of a medium cucumber or 1 stalk of celery (5)    ¨ 1 small baked potato (160)    ¨ 1 cup of breaded, fried vegetables (230)    · Fruit:      ¨ 1 (6-inch) banana (55)     ¨ ½ of a 4-inch grapefruit (55)    ¨ 15 grapes (60)    ¨ 1 medium orange or apple (70)    ¨ 1 large peach (65)    ¨ 1 cup of fresh pineapple chunks (75)    ¨ 1 cup of melon cubes (50)    ¨ 1¼ cups of whole strawberries (45)    ¨ ½ cup of fruit canned in juice (55)    ¨ ½ cup of fruit canned in heavy syrup (110)    ¨ ?  cup of raisins (130)    ¨ ½ cup of unsweetened fruit juice (60)    ¨ ½ cup of grape, cranberry, or prune juice (90)    · Fat:      ¨ 10 peanuts or 2 teaspoons of peanut butter (55)    ¨ 2 tablespoons of avocado or 1 tablespoon of regular salad dressing (45)    ¨ 2 slices of almendarez (90)    ¨ 1 teaspoon of oil, such as safflower, canola, corn, or olive oil (45)    ¨ 2 teaspoons of low-fat margarine, or 1 tablespoon of low-fat mayonnaise (50)    ¨ 1 teaspoon of regular margarine (40)    ¨ 1 tablespoon of regular mayonnaise (135)    ¨ 1 tablespoon of cream cheese or 2 tablespoons of low-fat cream cheese (45)    ¨ 2 tablespoons of vegetable shortening (215)    · Dessert and sweets:      ¨ 8 animal crackers or 5 vanilla wafers (80)    ¨ 1 frozen fruit juice bar (80)    ¨ ½ cup of ice milk or low-fat frozen yogurt (90)    ¨ ½ cup of sherbet or sorbet (125)    ¨ ½ cup of sugar-free pudding or custard (60)    ¨ ½ cup of ice cream (140)    ¨ ½ cup of pudding or custard (175)    ¨ 1 (2-inch) square chocolate brownie (185)    · Combination foods:      ¨ Bean burrito made with an 8-inch tortilla, without cheese (275)    ¨ Chicken breast sandwich with lettuce and tomato (325)    ¨ 1 cup of chicken noodle soup (60)    ¨ 1 beef taco (175)    ¨ Regular hamburger with lettuce and tomato (310)    ¨ Regular cheeseburger with lettuce and tomato (410)     ¨ ¼ of a 12-inch cheese pizza (280)    ¨ Fried fish sandwich with lettuce and tomato (425)    ¨ Hot dog and bun (275)    ¨ 1½ cups of macaroni and cheese (310)    ¨ Taco salad with a fried tortilla shell (870)    · Low-calorie foods:      ¨ 1 tablespoon of ketchup or 1 tablespoon of fat free sour cream (15)    ¨ 1 teaspoon of mustard (5)    ¨ ¼ cup of salsa (20)    ¨ 1 large dill pickle (15)    ¨ 1 tablespoon of fat free salad dressing (10)    ¨ 2 teaspoons of low-sugar, light jam or jelly, or 1 tablespoon of sugar-free syrup (15)    ¨ 1 sugar-free popsicle (15)    ¨ 1 cup of club soda, seltzer water, or diet soda (0)  CARE AGREEMENT:   You have the right to help plan your care  Discuss treatment options with your caregivers to decide what care you want to receive  You always have the right to refuse treatment  The above information is an  only  It is not intended as medical advice for individual conditions or treatments  Talk to your doctor, nurse or pharmacist before following any medical regimen to see if it is safe and effective for you  © 2017 [employer] Information is for End User's use only and may not be sold, redistributed or otherwise used for commercial purposes   All illustrations and images included in CareNotes® are the copyrighted property of [employer]  or [employer]

## 2020-02-17 NOTE — ASSESSMENT & PLAN NOTE
With the patient's BMI she is considered seriously obese  The patient is restricted and has been restricted in her activity level secondary to recent seizure activity, resection of her meningioma but states that slowly she is increasing her activity level    Patient states that she is also planning to work hard at reducing her caloric intake but this is difficult with her being more sedentary she does have some compulsions to eat

## 2020-02-17 NOTE — PROGRESS NOTES
Assessment/Plan:    Meningioma, cerebral [facility])  Patient late last year did have acute seizure disorder, secondary to meningioma status post resection  The patient still has some residual weakness to her right side but not significant  Has been on seizure medication and no further seizure activity  The patient is working part-time but hopes to go full-time in the near future  A major concern is the huge medical bills for her treatment  Patient has finished both physical therapy, occupational therapy and continues to follow-up with neuro and Neurosurgery  Hoping to get back her license in the next few months    Essential hypertension  Patient did have a slight elevation in her blood pressure reading today in the office  This was read checked in her blood pressure did come down to 140/70  Patient will continue present medication and we did discuss the importance of working at diet, weight loss  She admits that she has been more sedentary especially during the winter months and post surgically    Seizure disorder [facility])  Again were hoping to see that the patient can return back to driving after being seizure 3 for 6 months  His is 1 of her major goals       Diagnoses and all orders for this visit:    Essential hypertension    Seizure disorder ([facility] )    Meningioma, cerebral ([facility] )    Other orders  -     levETIRAcetam (KEPPRA) 250 mg tablet; TK 1 T PO BID          Subjective:      Patient ID: Stacy Winslow is a 60 y o  female  Patient is a 71-year-old female with a history of hypertension, hyperglycemia, hyperlipidemia  Status post resection of a meningioma which had precipitated seizure activity with the patient  Patient is completed physical therapy, occupational therapy and is back to working half time hopefully soon her her time activity level  Patient states she still has some residual weakness on the right hand side, slight numbness and tingling in the fingers to her right hand    No new neurologic changes, no seizure activity  Anxious to get back to being able to drive  Patient has no new complaints or concerns today      The following portions of the patient's history were reviewed and updated as appropriate:   She  has no past medical history on file  She   Patient Active Problem List    Diagnosis Date Noted    Meningioma, cerebral ([facility] ) 11/18/2019    [facility]iz[facility] disorder ([facility] ) 11/18/2019    Acute bacterial conjunctivitis of right eye 07/15/2019    Morbid obesity due to excess calories ([facility] ) 01/25/2019    Hyperglycemia 08/23/2016    Hyperlipemia 08/23/2016    Hypothyroidism 05/27/2016    Vitamin D deficiency 05/27/2016    Essential hypertension 05/27/2015     She  has no past surgical history on file  Her family history includes Heart disease in her father  She  reports that she has quit smoking  She has never used smokeless tobacco  Her alcohol and drug histories are not on file  Current Outpatient Medications   Medication Sig Dispense Refill    amLODIPine (NORVASC) 10 mg tablet Take 1 tablet (10 mg total) by mouth daily 30 tablet 5    cetirizine (ZyrTEC) 10 mg tablet TK 1 T PO QD  0    famotidine (PEPCID) 20 mg tablet Take 1 tablet (20 mg total) by mouth 2 (two) times a day 60 tablet 11    levETIRAcetam (KEPPRA) 250 mg tablet TK 1 T PO BID      levothyroxine 200 mcg tablet Take 1 tablet (200 mcg total) by mouth daily in the early morning 30 tablet 5    rosuvastatin (CRESTOR) 5 mg tablet Take 1 tablet (5 mg total) by mouth daily 30 tablet 5    valsartan-hydrochlorothiazide (DIOVAN-HCT) 320-12 5 MG per tablet TAKE 1 TABLET BY MOUTH EVERY DAY 30 tablet 0    ALPRAZolam (XANAX) 0 25 mg tablet Take 1 tablet (0 25 mg total) by mouth daily at bedtime as needed for sleep (Patient not taking: Reported on 2/17/2020) 30 tablet 1     No current facility-administered medications for this visit        Current Outpatient Medications on File Prior to Visit   Medication Sig    amLODIPine (NORVASC) 10 mg tablet Take 1 tablet (10 mg total) by mouth daily    cetirizine (ZyrTEC) 10 mg tablet TK 1 T PO QD    famotidine (PEPCID) 20 mg tablet Take 1 tablet (20 mg total) by mouth 2 (two) times a day    levETIRAcetam (KEPPRA) 250 mg tablet TK 1 T PO BID    levothyroxine 200 mcg tablet Take 1 tablet (200 mcg total) by mouth daily in the early morning    rosuvastatin (CRESTOR) 5 mg tablet Take 1 tablet (5 mg total) by mouth daily    valsartan-hydrochlorothiazide (DIOVAN-HCT) 320-12 5 MG per tablet TAKE 1 TABLET BY MOUTH EVERY DAY    ALPRAZolam (XANAX) 0 25 mg tablet Take 1 tablet (0 25 mg total) by mouth daily at bedtime as needed for sleep (Patient not taking: Reported on 2/17/2020)    [DISCONTINUED] phenytoin (DILANTIN) 100 mg ER capsule Take 100 mg by mouth Three times a day    [DISCONTINUED] tobramycin-dexamethasone (TOBRADEX) ophthalmic suspension Apply 1 drop to eye every 4 (four) hours while awake for 5 days     No current facility-administered medications on file prior to visit  She has No Known Allergies       Review of Systems   Constitutional: Positive for activity change (Continues to increase her activity level  Again hopes to be seizure-free and continue working full-time getting back to driving) and fatigue ( initially with returning to work she did have some episodes fatigue but feels that she is slowly getting stronger)  Negative for appetite change, chills, diaphoresis, fever and unexpected weight change  HENT: Negative  Eyes: Negative  Respiratory: Negative  Cardiovascular: Negative  Gastrointestinal: Negative  Endocrine: Negative  Genitourinary: Negative  Musculoskeletal: Negative  Allergic/Immunologic: Negative  Neurological: Positive for weakness ( right-sided weakness which she states is unchanged) and numbness ( some continued paresthesia to her right hand and fingers without change)   Negative for dizziness, tremors, seizures, syncope, facial asymmetry, speech difficulty, light-headedness and headaches  Hematological: Negative  Psychiatric/Behavioral: Negative for agitation, behavioral problems, confusion, decreased concentration, dysphoric mood, hallucinations, self-injury, sleep disturbance and suicidal ideas  The patient is nervous/anxious ( patient states that she is somewhat anxious beginning to get pills firm her hospitalization, surgical procedure     Was told was not a candidate for any help financially from the hospital)  The patient is not hyperactive  Objective:      /70   Pulse 82   Temp 98 1 °F (36 7 °C)   Ht 5' (1 524 m)   Wt 102 kg (224 lb)   SpO2 98%   BMI 43 75 kg/m²          Physical Exam   Constitutional: She is oriented to person, place, and time  She appears well-developed and well-nourished  No distress  Very pleasant, articulate overweight 73-year-old female who is awake alert in no acute distress and oriented x3  No further speech impediment   HENT:   Head: Normocephalic and atraumatic  Right Ear: External ear normal    Left Ear: External ear normal    Nose: Nose normal    Mouth/Throat: Oropharynx is clear and moist  No oropharyngeal exudate  Eyes: Pupils are equal, round, and reactive to light  Conjunctivae and EOM are normal  Right eye exhibits no discharge  Left eye exhibits no discharge  No scleral icterus  Neck: Normal range of motion  Neck supple  No JVD present  No tracheal deviation present  No thyromegaly present  Cardiovascular: Normal rate, regular rhythm, normal heart sounds and intact distal pulses  Exam reveals no gallop and no friction rub  No murmur heard  Pulmonary/Chest: Effort normal and breath sounds normal  No stridor  No respiratory distress  She has no wheezes  She has no rales  She exhibits no tenderness  Abdominal: Soft  Bowel sounds are normal  She exhibits no distension and no mass  There is no tenderness  There is no rebound and no guarding  No hernia     Obese   Musculoskeletal: Normal range of motion  She exhibits deformity (Some mild diffuse arthritic deformity but nothing acute)  She exhibits no edema or tenderness  Lymphadenopathy:     She has no cervical adenopathy  Neurological: She is alert and oriented to person, place, and time  She displays normal reflexes  A sensory deficit ( paresthesia to the right hand, fingers which is mild) is present  No cranial nerve deficit  She exhibits normal muscle tone  Coordination ( still some slight residual right-sided weakness, upper and lower extremities  No significant change from previously) abnormal    Skin: Skin is warm and dry  Capillary refill takes less than 2 seconds  No rash noted  She is not diaphoretic  No erythema  No pallor  Psychiatric: Her behavior is normal  Judgment and thought content normal    Slightly anxious mood and affect today again mostly related to her financial difficulties   Nursing note and vitals reviewed  BMI Counseling: Body mass index is 43 75 kg/m²  The BMI is above normal  Nutrition recommendations include reducing portion sizes, decreasing overall calorie intake, 3-5 servings of fruits/vegetables daily and consuming healthier snacks  Exercise recommendations include moderate aerobic physical activity for 150 minutes/week

## 2020-02-27 DIAGNOSIS — E78.00 PURE HYPERCHOLESTEROLEMIA: ICD-10-CM

## 2020-02-27 RX ORDER — ROSUVASTATIN CALCIUM 5 MG/1
5 TABLET, COATED ORAL DAILY
Qty: 30 TABLET | Refills: 5 | Status: SHIPPED | OUTPATIENT
Start: 2020-02-27 | End: 2020-08-17 | Stop reason: SDUPTHER

## 2020-03-16 DIAGNOSIS — I10 ESSENTIAL HYPERTENSION: ICD-10-CM

## 2020-03-16 RX ORDER — VALSARTAN AND HYDROCHLOROTHIAZIDE 320; 12.5 MG/1; MG/1
1 TABLET, FILM COATED ORAL DAILY
Qty: 30 TABLET | Refills: 5 | Status: SHIPPED | OUTPATIENT
Start: 2020-03-16 | End: 2020-09-14

## 2020-04-01 DIAGNOSIS — J30.1 SEASONAL ALLERGIC RHINITIS DUE TO POLLEN: Primary | ICD-10-CM

## 2020-04-01 RX ORDER — CETIRIZINE HYDROCHLORIDE 10 MG/1
TABLET ORAL
Qty: 30 TABLET | Refills: 3 | Status: SHIPPED | OUTPATIENT
Start: 2020-04-01 | End: 2021-01-18 | Stop reason: ALTCHOICE

## 2020-05-23 DIAGNOSIS — I10 ESSENTIAL HYPERTENSION: ICD-10-CM

## 2020-05-26 RX ORDER — AMLODIPINE BESYLATE 10 MG/1
10 TABLET ORAL DAILY
Qty: 30 TABLET | Refills: 0 | Status: SHIPPED | OUTPATIENT
Start: 2020-05-26 | End: 2020-06-23 | Stop reason: SDUPTHER

## 2020-06-23 DIAGNOSIS — I10 ESSENTIAL HYPERTENSION: ICD-10-CM

## 2020-06-23 RX ORDER — AMLODIPINE BESYLATE 10 MG/1
10 TABLET ORAL DAILY
Qty: 30 TABLET | Refills: 3 | Status: SHIPPED | OUTPATIENT
Start: 2020-06-23 | End: 2020-10-18 | Stop reason: SDUPTHER

## 2020-08-05 DIAGNOSIS — E03.9 ACQUIRED HYPOTHYROIDISM: ICD-10-CM

## 2020-08-05 RX ORDER — LEVOTHYROXINE SODIUM 0.2 MG/1
200 TABLET ORAL
Qty: 30 TABLET | Refills: 5 | Status: SHIPPED | OUTPATIENT
Start: 2020-08-05 | End: 2020-08-07 | Stop reason: SDUPTHER

## 2020-08-07 DIAGNOSIS — E03.9 ACQUIRED HYPOTHYROIDISM: ICD-10-CM

## 2020-08-07 RX ORDER — LEVOTHYROXINE SODIUM 0.2 MG/1
200 TABLET ORAL
Qty: 90 TABLET | Refills: 2 | Status: SHIPPED | OUTPATIENT
Start: 2020-08-07 | End: 2021-08-16

## 2020-08-17 DIAGNOSIS — E78.00 PURE HYPERCHOLESTEROLEMIA: ICD-10-CM

## 2020-08-18 RX ORDER — ROSUVASTATIN CALCIUM 5 MG/1
5 TABLET, COATED ORAL DAILY
Qty: 30 TABLET | Refills: 0 | Status: SHIPPED | OUTPATIENT
Start: 2020-08-18 | End: 2020-09-25 | Stop reason: SDUPTHER

## 2020-09-12 DIAGNOSIS — I10 ESSENTIAL HYPERTENSION: ICD-10-CM

## 2020-09-14 RX ORDER — VALSARTAN AND HYDROCHLOROTHIAZIDE 320; 12.5 MG/1; MG/1
1 TABLET, FILM COATED ORAL DAILY
Qty: 30 TABLET | Refills: 5 | Status: SHIPPED | OUTPATIENT
Start: 2020-09-14 | End: 2021-03-09

## 2020-09-22 DIAGNOSIS — F51.02 ADJUSTMENT INSOMNIA: ICD-10-CM

## 2020-09-23 RX ORDER — ALPRAZOLAM 0.25 MG/1
0.25 TABLET ORAL
Qty: 30 TABLET | Refills: 0 | Status: SHIPPED | OUTPATIENT
Start: 2020-09-23 | End: 2020-10-16 | Stop reason: SDUPTHER

## 2020-09-25 DIAGNOSIS — E78.00 PURE HYPERCHOLESTEROLEMIA: ICD-10-CM

## 2020-09-25 RX ORDER — ROSUVASTATIN CALCIUM 5 MG/1
5 TABLET, COATED ORAL DAILY
Qty: 30 TABLET | Refills: 0 | Status: SHIPPED | OUTPATIENT
Start: 2020-09-25 | End: 2020-10-08

## 2020-10-08 DIAGNOSIS — E78.00 PURE HYPERCHOLESTEROLEMIA: ICD-10-CM

## 2020-10-08 RX ORDER — ROSUVASTATIN CALCIUM 5 MG/1
TABLET, COATED ORAL
Qty: 90 TABLET | Refills: 3 | Status: SHIPPED | OUTPATIENT
Start: 2020-10-08 | End: 2021-08-16

## 2020-10-16 ENCOUNTER — OFFICE VISIT (OUTPATIENT)
Dept: INTERNAL MEDICINE CLINIC | Facility: CLINIC | Age: 65
End: 2020-10-16

## 2020-10-16 VITALS
HEART RATE: 92 BPM | DIASTOLIC BLOOD PRESSURE: 78 MMHG | HEIGHT: 60 IN | WEIGHT: 229 LBS | BODY MASS INDEX: 44.96 KG/M2 | OXYGEN SATURATION: 96 % | SYSTOLIC BLOOD PRESSURE: 138 MMHG | TEMPERATURE: 98.3 F

## 2020-10-16 DIAGNOSIS — Z12.31 ENCOUNTER FOR SCREENING MAMMOGRAM FOR MALIGNANT NEOPLASM OF BREAST: ICD-10-CM

## 2020-10-16 DIAGNOSIS — E66.01 MORBID OBESITY DUE TO EXCESS CALORIES (HCC): ICD-10-CM

## 2020-10-16 DIAGNOSIS — E78.00 PURE HYPERCHOLESTEROLEMIA: ICD-10-CM

## 2020-10-16 DIAGNOSIS — F51.02 ADJUSTMENT INSOMNIA: ICD-10-CM

## 2020-10-16 DIAGNOSIS — E66.01 MORBID OBESITY WITH BMI OF 40.0-44.9, ADULT (HCC): ICD-10-CM

## 2020-10-16 DIAGNOSIS — I10 ESSENTIAL HYPERTENSION: Primary | ICD-10-CM

## 2020-10-16 DIAGNOSIS — D32.0 MENINGIOMA, CEREBRAL (HCC): ICD-10-CM

## 2020-10-16 DIAGNOSIS — Z00.00 HEALTHCARE MAINTENANCE: ICD-10-CM

## 2020-10-16 DIAGNOSIS — G40.909 SEIZURE DISORDER (HCC): ICD-10-CM

## 2020-10-16 DIAGNOSIS — R73.9 HYPERGLYCEMIA: ICD-10-CM

## 2020-10-16 DIAGNOSIS — Z12.11 COLON CANCER SCREENING: ICD-10-CM

## 2020-10-16 DIAGNOSIS — E55.9 VITAMIN D DEFICIENCY: ICD-10-CM

## 2020-10-16 DIAGNOSIS — E03.9 ACQUIRED HYPOTHYROIDISM: ICD-10-CM

## 2020-10-16 DIAGNOSIS — E66.01 CLASS 3 SEVERE OBESITY DUE TO EXCESS CALORIES WITH SERIOUS COMORBIDITY AND BODY MASS INDEX (BMI) OF 40.0 TO 44.9 IN ADULT (HCC): ICD-10-CM

## 2020-10-16 PROCEDURE — 99212 OFFICE O/P EST SF 10 MIN: CPT | Performed by: INTERNAL MEDICINE

## 2020-10-16 RX ORDER — ALPRAZOLAM 0.25 MG/1
0.25 TABLET ORAL
Qty: 30 TABLET | Refills: 0 | Status: SHIPPED | OUTPATIENT
Start: 2020-10-16 | End: 2021-01-18 | Stop reason: SDUPTHER

## 2020-10-18 DIAGNOSIS — I10 ESSENTIAL HYPERTENSION: ICD-10-CM

## 2020-10-19 DIAGNOSIS — I10 ESSENTIAL HYPERTENSION: ICD-10-CM

## 2020-10-19 RX ORDER — AMLODIPINE BESYLATE 10 MG/1
10 TABLET ORAL DAILY
Qty: 30 TABLET | Refills: 0 | Status: SHIPPED | OUTPATIENT
Start: 2020-10-19 | End: 2020-10-19

## 2020-10-19 RX ORDER — AMLODIPINE BESYLATE 10 MG/1
TABLET ORAL
Qty: 90 TABLET | Refills: 3 | Status: SHIPPED | OUTPATIENT
Start: 2020-10-19 | End: 2021-08-16

## 2020-12-14 DIAGNOSIS — K29.40 ATROPHIC GASTRITIS WITHOUT HEMORRHAGE: ICD-10-CM

## 2020-12-14 RX ORDER — FAMOTIDINE 20 MG/1
20 TABLET, FILM COATED ORAL 2 TIMES DAILY
Qty: 60 TABLET | Refills: 11 | Status: SHIPPED | OUTPATIENT
Start: 2020-12-14 | End: 2020-12-14

## 2020-12-14 RX ORDER — FAMOTIDINE 20 MG/1
TABLET, FILM COATED ORAL
Qty: 180 TABLET | Refills: 3 | Status: SHIPPED | OUTPATIENT
Start: 2020-12-14 | End: 2021-10-04

## 2021-01-08 DIAGNOSIS — Z12.31 ENCOUNTER FOR SCREENING MAMMOGRAM FOR MALIGNANT NEOPLASM OF BREAST: ICD-10-CM

## 2021-01-15 LAB — HBA1C MFR BLD HPLC: 5.8 %

## 2021-01-18 ENCOUNTER — OFFICE VISIT (OUTPATIENT)
Dept: INTERNAL MEDICINE CLINIC | Facility: CLINIC | Age: 66
End: 2021-01-18
Payer: MEDICARE

## 2021-01-18 VITALS
HEART RATE: 90 BPM | WEIGHT: 232 LBS | OXYGEN SATURATION: 97 % | BODY MASS INDEX: 45.55 KG/M2 | TEMPERATURE: 97.7 F | DIASTOLIC BLOOD PRESSURE: 70 MMHG | SYSTOLIC BLOOD PRESSURE: 142 MMHG | HEIGHT: 60 IN

## 2021-01-18 DIAGNOSIS — Z00.00 MEDICARE ANNUAL WELLNESS VISIT, INITIAL: ICD-10-CM

## 2021-01-18 DIAGNOSIS — E03.9 ACQUIRED HYPOTHYROIDISM: Primary | ICD-10-CM

## 2021-01-18 DIAGNOSIS — R73.9 HYPERGLYCEMIA: ICD-10-CM

## 2021-01-18 DIAGNOSIS — Z00.00 INITIAL MEDICARE ANNUAL WELLNESS VISIT: ICD-10-CM

## 2021-01-18 DIAGNOSIS — D32.0 MENINGIOMA, CEREBRAL (HCC): ICD-10-CM

## 2021-01-18 DIAGNOSIS — F51.02 ADJUSTMENT INSOMNIA: ICD-10-CM

## 2021-01-18 DIAGNOSIS — E55.9 VITAMIN D DEFICIENCY: ICD-10-CM

## 2021-01-18 DIAGNOSIS — I10 ESSENTIAL HYPERTENSION: ICD-10-CM

## 2021-01-18 PROCEDURE — G0403 EKG FOR INITIAL PREVENT EXAM: HCPCS | Performed by: INTERNAL MEDICINE

## 2021-01-18 PROCEDURE — 99214 OFFICE O/P EST MOD 30 MIN: CPT | Performed by: INTERNAL MEDICINE

## 2021-01-18 PROCEDURE — G0402 INITIAL PREVENTIVE EXAM: HCPCS | Performed by: INTERNAL MEDICINE

## 2021-01-18 PROCEDURE — 1123F ACP DISCUSS/DSCN MKR DOCD: CPT | Performed by: INTERNAL MEDICINE

## 2021-01-18 RX ORDER — ALPRAZOLAM 0.25 MG/1
0.25 TABLET ORAL
Qty: 30 TABLET | Refills: 0 | Status: SHIPPED | OUTPATIENT
Start: 2021-01-18 | End: 2022-03-25 | Stop reason: SDUPTHER

## 2021-01-18 NOTE — ASSESSMENT & PLAN NOTE
The patient's TSH is  Low  Patient was told that we would like her to decrease her intake of levothyroxine in only take this 6 days per week instead of 7  Check thyroid function with her next visit

## 2021-01-18 NOTE — PROGRESS NOTES
Assessment/Plan:    Medicare annual wellness visit, initial   Patient is here today for her 1st Medicare wellness visit  She did have labs performed prior to the visit today and we did discuss the results  Patient recently had a follow-up with her neurologist and she is continuing on her anti seizure medication  The patient states her biggest problem is her weight and she is just in listed in a a bariatric program and may eventually have surgery to help her with her obesity  Patient has no new medical complaints or concerns  Is now working part-time  Underwent physical exam today in the office showing no acute abnormalities    Hypothyroidism   The patient's TSH is  Low  Patient was told that we would like her to decrease her intake of levothyroxine in only take this 6 days per week instead of 7  Check thyroid function with her next visit  Essential hypertension    With initial presentation to the office today her blood pressure systolic was elevated  Once the patient was allowed to sit relax we did recheck this and he had to come down to an acceptable range  Kidney function is stable  Patient will continue present medication and surveillance    Meningioma, cerebral [facility])   Patient is status post resection of meningioma  Repeat scans show no evidence of recurrence  Patient had for follow-up recently with Neurology and decision was made to continue with her seizure medication indefinitely  Class 3 severe obesity due to excess calories with serious comorbidity in adult [facility])    Again we discussed with the patient the problems with weight  Patient relates that she has already contacted the bariatric department and will be signing of for program which may lead eventually to surgery    Hyperglycemia   Fasting blood sugars elevated, hemoglobin A1c is in the range of prediabetes    Again patient is hoping to see some improvement with her bariatric program   Will check a fasting blood sugar hemoglobin A1c with her next visit    Hyperlipemia   Cholesterol well controlled  Patient will continue present medication and surveillance  Vitamin D deficiency    Patient did have a vitamin-D level checked  She is low and admits that she is not always taking her supplements as directed  We stressed the importance of making sure her vitamin D level is stable and she will restart her supplements specifically vitamin-D 1000 internationally units daily       Diagnoses and all orders for this visit:    Acquired hypothyroidism  -     TSH, 3rd generation with Free T4 reflex; Future    Hyperglycemia  -     Basic metabolic panel; Future  -     Hemoglobin A1C; Future    Essential hypertension  -     Basic metabolic panel; Future    Initial Medicare annual wellness visit  -     POCT ECG    Adjustment insomnia  -     ALPRAZolam (XANAX) 0 25 mg tablet; Take 1 tablet (0 25 mg total) by mouth daily at bedtime as needed for sleep    Medicare annual wellness visit, initial    Meningioma, cerebral ([facility] )    Vitamin D deficiency          Subjective:      Patient ID: Siri Patel is a 73 y o  female  55-year-old female history of a medical problems as outlined previously  Patient is here today for her initial Medicare wellness visit  Patient did have lab testing performed prior to the visit today we did discuss the results of length with the patient  Patient relates in general she is doing about the same but she is just signed up for a bariatric program diet and possibly in the future surgical treatment  The following portions of the patient's history were reviewed and updated as appropriate: She  has no past medical history on file    She   Patient Active Problem List    Diagnosis Date Noted    Medicare annual wellness visit, initial 10/16/2020    Class 3 severe obesity due to excess calories with serious comorbidity in adult [facility]) 02/17/2020    Meningioma, cerebral ([facility] ) 11/18/2019    Seizure disorder ([place] [address] ) 11/18/2019    Acute bacterial conjunctivitis of right eye 07/15/2019    Morbid obesity due to excess calories ([facility] ) 01/25/2019    Hyperglycemia 08/23/2016    Hyperlipemia 08/23/2016    Hypothyroidism 05/27/2016    Vitamin D deficiency 05/27/2016    Essential hypertension 05/27/2015     She  has no past surgical history on file  Her family history includes Heart disease in her father  She  reports that she has quit smoking  She has never used smokeless tobacco  No history on file for alcohol and drug  Current Outpatient Medications   Medication Sig Dispense Refill    ALPRAZolam (XANAX) 0 25 mg tablet Take 1 tablet (0 25 mg total) by mouth daily at bedtime as needed for sleep 30 tablet 0    amLODIPine (NORVASC) 10 mg tablet TAKE 1 TABLET(10 MG) BY MOUTH DAILY 90 tablet 3    famotidine (PEPCID) 20 mg tablet TAKE 1 TABLET(20 MG) BY MOUTH TWICE DAILY 180 tablet 3    levETIRAcetam (KEPPRA) 250 mg tablet TK 1 T PO BID      levothyroxine 200 mcg tablet Take 1 tablet (200 mcg total) by mouth daily in the early morning 90 tablet 2    rosuvastatin (CRESTOR) 5 mg tablet TAKE 1 TABLET BY MOUTH EVERY DAY 90 tablet 3    valsartan-hydrochlorothiazide (DIOVAN-HCT) 320-12 5 MG per tablet TAKE 1 TABLET BY MOUTH DAILY 30 tablet 5     No current facility-administered medications for this visit        Current Outpatient Medications on File Prior to Visit   Medication Sig    amLODIPine (NORVASC) 10 mg tablet TAKE 1 TABLET(10 MG) BY MOUTH DAILY    famotidine (PEPCID) 20 mg tablet TAKE 1 TABLET(20 MG) BY MOUTH TWICE DAILY    levETIRAcetam (KEPPRA) 250 mg tablet TK 1 T PO BID    levothyroxine 200 mcg tablet Take 1 tablet (200 mcg total) by mouth daily in the early morning    rosuvastatin (CRESTOR) 5 mg tablet TAKE 1 TABLET BY MOUTH EVERY DAY    valsartan-hydrochlorothiazide (DIOVAN-HCT) 320-12 5 MG per tablet TAKE 1 TABLET BY MOUTH DAILY    [DISCONTINUED] ALPRAZolam (XANAX) 0 25 mg tablet Take 1 tablet (0 25 mg total) by mouth daily at bedtime as needed for sleep    [DISCONTINUED] cetirizine (ZyrTEC) 10 mg tablet One pill daily as needed     No current facility-administered medications on file prior to visit  She has No Known Allergies       Review of Systems   Constitutional: Negative  HENT: Negative  Eyes: Negative  Respiratory: Negative  Cardiovascular: Negative  Gastrointestinal: Negative  Endocrine: Negative  Genitourinary: Negative  Musculoskeletal: Negative  Skin: Negative  Allergic/Immunologic: Negative  Neurological: Negative  Hematological: Negative  Psychiatric/Behavioral: Negative  Objective:      /70   Pulse 90   Temp 97 7 °F (36 5 °C) (Tympanic)   Ht 5' (1 524 m)   Wt 105 kg (232 lb)   SpO2 97%   BMI 45 31 kg/m²          Physical Exam  Vitals signs and nursing note reviewed  Constitutional:       General: She is not in acute distress  Appearance: Normal appearance  She is obese  She is not ill-appearing, toxic-appearing or diaphoretic  Comments:   Obese 71-year-old female who is awake alert no acute distress and oriented x3   HENT:      Head: Normocephalic and atraumatic  Right Ear: Tympanic membrane, ear canal and external ear normal  There is no impacted cerumen  Left Ear: Tympanic membrane, ear canal and external ear normal  There is no impacted cerumen  Nose: Nose normal  No congestion or rhinorrhea  Mouth/Throat:      Mouth: Mucous membranes are moist       Pharynx: Oropharynx is clear  No oropharyngeal exudate or posterior oropharyngeal erythema  Comments:  Severe Crohn Ng of oral airway, thick tongue, unable to visualize posterior airway  Eyes:      General: No scleral icterus  Right eye: No discharge  Left eye: No discharge  Extraocular Movements: Extraocular movements intact  Conjunctiva/sclera: Conjunctivae normal       Pupils: Pupils are equal, round, and reactive to light     Cardiovascular: Rate and Rhythm: Normal rate and regular rhythm  Pulses: Normal pulses  Heart sounds: Gallop present  Pulmonary:      Effort: Pulmonary effort is normal  No respiratory distress  Breath sounds: No stridor  No wheezing, rhonchi or rales  Chest:      Chest wall: No tenderness  Abdominal:      General: Bowel sounds are normal  There is no distension  Palpations: Abdomen is soft  There is no mass  Tenderness: There is no abdominal tenderness  There is no right CVA tenderness, left CVA tenderness, guarding or rebound  Hernia: No hernia is present  Comments:  obese   Musculoskeletal: Normal range of motion  General: No swelling, tenderness, deformity or signs of injury  Right lower leg: No edema  Left lower leg: No edema  Skin:     General: Skin is warm and dry  Coloration: Skin is not jaundiced or pale  Findings: No bruising, erythema, lesion or rash  Neurological:      General: No focal deficit present  Mental Status: She is alert and oriented to person, place, and time  Mental status is at baseline  Cranial Nerves: No cranial nerve deficit  Sensory: No sensory deficit  Motor: No weakness  Coordination: Coordination normal       Gait: Gait normal       Deep Tendon Reflexes: Reflexes abnormal ( absent patella and Achilles tendon reflexes bilaterally)  Psychiatric:         Mood and Affect: Mood normal          Behavior: Behavior normal          Thought Content:  Thought content normal          Judgment: Judgment normal

## 2021-01-18 NOTE — ASSESSMENT & PLAN NOTE
Patient did have a vitamin-D level checked  She is low and admits that she is not always taking her supplements as directed    We stressed the importance of making sure her vitamin D level is stable and she will restart her supplements specifically vitamin-D 1000 internationally units daily

## 2021-01-18 NOTE — PROGRESS NOTES
Assessment and Plan:     Problem List Items Addressed This Visit     None           Preventive health issues were discussed with patient, and age appropriate screening tests were ordered as noted in patient's After Visit Summary  Personalized health advice and appropriate referrals for health education or preventive services given if needed, as noted in patient's After Visit Summary  History of Present Illness:     Patient presents for Welcome to Medicare visit  Patient Care Team:  Do Watkins, DO as PCP - General     Review of Systems:     Review of Systems   Problem List:     Patient Active Problem List   Diagnosis    Essential hypertension    Hyperglycemia    Hyperlipemia    Hypothyroidism    Morbid obesity due to excess calories ([facility] )    Acute bacterial conjunctivitis of right eye    Vitamin D deficiency    Meningioma, cerebral ([facility] )    Seizure disorder ([facility] )    Class 3 severe obesity due to excess calories with serious comorbidity in adult [facility])   [address] maintenance      Past Medical and Surgical History:     No past medical history on file  No past surgical history on file     Family History:     Family History   Problem Relation Age of Onset    Heart disease Father       Social History:        Social History     Socioeconomic History    Marital status: Single     Spouse name: Not on file    Number of children: Not on file    Years of education: Not on file    Highest education level: Not on file   Occupational History    Not on file   Social Needs    Financial resource strain: Not on file    Food insecurity     Worry: Not on file     Inability: Not on file    Transportation needs     Medical: Not on file     Non-medical: Not on file   Tobacco Use    Smoking status: Former Smoker    Smokeless tobacco: Never Used   Substance and Sexual Activity    Alcohol use: Not on file    Drug use: Not on file    Sexual activity: Not on file   Lifestyle    Physical activity Days per week: Not on file     Minutes per session: Not on file    Stress: Not on file   Relationships    Social connections     Talks on phone: Not on file     Gets together: Not on file     Attends [employer] service: Not on file     Active member of club or organization: Not on file     Attends meetings of clubs or organizations: Not on file     Relationship status: Not on file    Intimate partner violence     Fear of current or ex partner: Not on file     Emotionally abused: Not on file     Physically abused: Not on file     Forced sexual activity: Not on file   Other Topics Concern    Not on file   Social History Narrative    Not on file      Medications and Allergies:     Current Outpatient Medications   Medication Sig Dispense Refill    ALPRAZolam (XANAX) 0 25 mg tablet Take 1 tablet (0 25 mg total) by mouth daily at bedtime as needed for sleep 30 tablet 0    amLODIPine (NORVASC) 10 mg tablet TAKE 1 TABLET(10 MG) BY MOUTH DAILY 90 tablet 3    cetirizine (ZyrTEC) 10 mg tablet One pill daily as needed 30 tablet 3    famotidine (PEPCID) 20 mg tablet TAKE 1 TABLET(20 MG) BY MOUTH TWICE DAILY 180 tablet 3    levETIRAcetam (KEPPRA) 250 mg tablet TK 1 T PO BID      levothyroxine 200 mcg tablet Take 1 tablet (200 mcg total) by mouth daily in the early morning 90 tablet 2    rosuvastatin (CRESTOR) 5 mg tablet TAKE 1 TABLET BY MOUTH EVERY DAY 90 tablet 3    valsartan-hydrochlorothiazide (DIOVAN-HCT) 320-12 5 MG per tablet TAKE 1 TABLET BY MOUTH DAILY 30 tablet 5     No current facility-administered medications for this visit        No Known Allergies   Immunizations:     Immunization History   Administered Date(s) Administered    INFLUENZA 11/25/2011    Influenza, injectable, quadrivalent, preservative free 0 5 mL 09/19/2020    Td (adult), Unspecified 01/01/2007      Health Maintenance:         Topic Date Due    Hepatitis C Screening  1955    HIV Screening  01/13/1970    Colonoscopy Surveillance 10/11/2021    MAMMOGRAM  01/07/2022    Colorectal Cancer Screening  10/11/2026         Topic Date Due    DTaP,Tdap,and Td Vaccines (1 - Tdap) 01/13/1977    Pneumococcal Vaccine: 65+ Years (1 of 1 - PPSV23) 01/13/2021      Medicare Screening Tests and Risk Assessments:     Kylah Wilkinson is here for her Subsequent Wellness visit  Health Risk Assessment:   Patient rates overall health as good  Patient feels that their physical health rating is much better  Eyesight was rated as same  Hearing was rated as same  Patient feels that their emotional and mental health rating is same  Pain experienced in the last 7 days has been some  Patient's pain rating has been 6/10  Patient states that she has experienced no weight loss or gain in last 6 months  Depression Screening:   PHQ-2 Score: 0      Fall Risk Screening: In the past year, patient has experienced: no history of falling in past year      Urinary Incontinence Screening:   Patient has not leaked urine accidently in the last six months  Home Safety:  Patient does not have trouble with stairs inside or outside of their home  Patient has working smoke alarms and has working carbon monoxide detector  Home safety hazards include: none  Nutrition:   Current diet is Regular and Limited junk food  Medications:   Patient is currently taking over-the-counter supplements  OTC medications include: see medication list  Patient is able to manage medications  Activities of Daily Living (ADLs)/Instrumental Activities of Daily Living (IADLs):   Walk and transfer into and out of bed and chair?: Yes  Dress and groom yourself?: Yes    Bathe or shower yourself?: Yes    Feed yourself?  Yes  Do your laundry/housekeeping?: Yes  Manage your money, pay your bills and track your expenses?: Yes  Make your own meals?: Yes    Do your own shopping?: Yes    Previous Hospitalizations:   Any hospitalizations or ED visits within the last 12 months?: No      Advance Care Planning: Living will: No    Durable POA for healthcare: No    Advanced directive: No      PREVENTIVE SCREENINGS      Cardiovascular Screening:    General: Screening Not Indicated and History Lipid Disorder      Diabetes Screening:     General: Screening Current      Colorectal Cancer Screening:     General: Screening Current      Breast Cancer Screening:     General: Screening Current      Cervical Cancer Screening:    General: Screening Not Indicated      Lung Cancer Screening:     General: Screening Not Indicated    No exam data present     Physical Exam:     Temp 97 7 °F (36 5 °C) (Tympanic)     Physical Exam     Georgette Joseph DO

## 2021-01-18 NOTE — ASSESSMENT & PLAN NOTE
With initial presentation to the office today her blood pressure systolic was elevated  Once the patient was allowed to sit relax we did recheck this and he had to come down to an acceptable range  Kidney function is stable    Patient will continue present medication and surveillance

## 2021-01-18 NOTE — ASSESSMENT & PLAN NOTE
Patient is here today for her 1st Medicare wellness visit  She did have labs performed prior to the visit today and we did discuss the results  Patient recently had a follow-up with her neurologist and she is continuing on her anti seizure medication  The patient states her biggest problem is her weight and she is just in listed in a a bariatric program and may eventually have surgery to help her with her obesity  Patient has no new medical complaints or concerns  Is now working part-time    Underwent physical exam today in the office showing no acute abnormalities

## 2021-01-18 NOTE — ASSESSMENT & PLAN NOTE
Patient is status post resection of meningioma  Repeat scans show no evidence of recurrence  Patient had for follow-up recently with Neurology and decision was made to continue with her seizure medication indefinitely

## 2021-01-18 NOTE — ASSESSMENT & PLAN NOTE
Again we discussed with the patient the problems with weight    Patient relates that she has already contacted the bariatric department and will be signing of for program which may lead eventually to surgery

## 2021-01-18 NOTE — PATIENT INSTRUCTIONS
Medicare Preventive Visit Patient Instructions  Thank you for completing your Welcome to Medicare Visit or Medicare Annual Wellness Visit today  Your next wellness visit will be due in one year (1/18/2022)  The screening/preventive services that you may require over the next 5-10 years are detailed below  Some tests may not apply to you based off risk factors and/or age  Screening tests ordered at today's visit but not completed yet may show as past due  Also, please note that scanned in results may not display below  Preventive Screenings:  Service Recommendations Previous Testing/Comments   Colorectal Cancer Screening  * Colonoscopy    * Fecal Occult Blood Test (FOBT)/Fecal Immunochemical Test (FIT)  * Fecal DNA/Cologuard Test  * Flexible Sigmoidoscopy Age: 54-65 years old   Colonoscopy: every 10 years (may be performed more frequently if at higher risk)  OR  FOBT/FIT: every 1 year  OR  Cologuard: every 3 years  OR  Sigmoidoscopy: every 5 years  Screening may be recommended earlier than age 48 if at higher risk for colorectal cancer  Also, an individualized decision between you and your healthcare provider will decide whether screening between the ages of 74-80 would be appropriate  Colonoscopy: 10/11/2016  FOBT/FIT: Not on file  Cologuard: Not on file  Sigmoidoscopy: Not on file    Screening Current     Breast Cancer Screening Age: 36 years old  Frequency: every 1-2 years  Not required if history of left and right mastectomy Mammogram: 01/07/2021    Screening Current   Cervical Cancer Screening Between the ages of 21-29, pap smear recommended once every 3 years  Between the ages of 33-67, can perform pap smear with HPV co-testing every 5 years     Recommendations may differ for women with a history of total hysterectomy, cervical cancer, or abnormal pap smears in past  Pap Smear: Not on file    Screening Not Indicated   Hepatitis C Screening Once for adults born between 1945 and 1965  More frequently in patients at high risk for Hepatitis C Hep C Antibody: Not on file       Diabetes Screening 1-2 times per year if you're at risk for diabetes or have pre-diabetes Fasting glucose: No results in last 5 years   A1C: 5 8 %    Screening Current   Cholesterol Screening Once every 5 years if you don't have a lipid disorder  May order more often based on risk factors  Lipid panel: 01/15/2021    Screening Not Indicated  History Lipid Disorder     Other Preventive Screenings Covered by Medicare:  1  Abdominal Aortic Aneurysm (AAA) Screening: covered once if your at risk  You're considered to be at risk if you have a family history of AAA  2  Lung Cancer Screening: covers low dose CT scan once per year if you meet all of the following conditions: (1) Age 50-69; (2) No signs or symptoms of lung cancer; (3) Current smoker or have quit smoking within the last 15 years; (4) You have a tobacco smoking history of at least 30 pack years (packs per day multiplied by number of years you smoked); (5) You get a written order from a healthcare provider  3  Glaucoma Screening: covered annually if you're considered high risk: (1) You have diabetes OR (2) Family history of glaucoma OR (3)  aged 48 and older OR (3)  American aged 72 and older  3  Osteoporosis Screening: covered every 2 years if you meet one of the following conditions: (1) You're estrogen deficient and at risk for osteoporosis based off medical history and other findings; (2) Have a vertebral abnormality; (3) On glucocorticoid therapy for more than 3 months; (4) Have primary hyperparathyroidism; (5) On osteoporosis medications and need to assess response to drug therapy  · Last bone density test (DXA Scan): Not on file  5  HIV Screening: covered annually if you're between the age of 12-76  Also covered annually if you are younger than 13 and older than 72 with risk factors for HIV infection   For pregnant patients, it is covered up to 3 times per pregnancy  Immunizations:  Immunization Recommendations   Influenza Vaccine Annual influenza vaccination during flu season is recommended for all persons aged >= 6 months who do not have contraindications   Pneumococcal Vaccine (Prevnar and Pneumovax)  * Prevnar = PCV13  * Pneumovax = PPSV23   Adults 25-60 years old: 1-3 doses may be recommended based on certain risk factors  Adults 72 years old: Prevnar (PCV13) vaccine recommended followed by Pneumovax (PPSV23) vaccine  If already received PPSV23 since turning 65, then PCV13 recommended at least one year after PPSV23 dose  Hepatitis B Vaccine 3 dose series if at intermediate or high risk (ex: diabetes, end stage renal disease, liver disease)   Tetanus (Td) Vaccine - COST NOT COVERED BY MEDICARE PART B Following completion of primary series, a booster dose should be given every 10 years to maintain immunity against tetanus  Td may also be given as tetanus wound prophylaxis  Tdap Vaccine - COST NOT COVERED BY MEDICARE PART B Recommended at least once for all adults  For pregnant patients, recommended with each pregnancy  Shingles Vaccine (Shingrix) - COST NOT COVERED BY MEDICARE PART B  2 shot series recommended in those aged 48 and above     Health Maintenance Due:      Topic Date Due    Hepatitis C Screening  1955    HIV Screening  01/13/1971    Colonoscopy Surveillance  10/11/2021    MAMMOGRAM  01/07/2022    Colorectal Cancer Screening  10/11/2026     Immunizations Due:      Topic Date Due    DTaP,Tdap,and Td Vaccines (1 - Tdap) 01/13/1977    Pneumococcal Vaccine: 65+ Years (1 of 1 - PPSV23) 01/13/2021     Advance Directives   What are advance directives? Advance directives are legal documents that state your wishes and plans for medical care  These plans are made ahead of time in case you lose your ability to make decisions for yourself   Advance directives can apply to any medical decision, such as the treatments you want, and if you want to donate organs  What are the types of advance directives? There are many types of advance directives, and each state has rules about how to use them  You may choose a combination of any of the following:  · Living will: This is a written record of the treatment you want  You can also choose which treatments you do not want, which to limit, and which to stop at a certain time  This includes surgery, medicine, IV fluid, and tube feedings  · Durable power of  for healthcare [facility]): This is a written record that states who you want to make healthcare choices for you when you are unable to make them for yourself  This person, called a proxy, is usually a family member or a friend  You may choose more than 1 proxy  · Do not resuscitate (DNR) order:  A DNR order is used in case your heart stops beating or you stop breathing  It is a request not to have certain forms of treatment, such as CPR  A DNR order may be included in other types of advance directives  · Medical directive: This covers the care that you want if you are in a coma, near death, or unable to make decisions for yourself  You can list the treatments you want for each condition  Treatment may include pain medicine, surgery, blood transfusions, dialysis, IV or tube feedings, and a ventilator (breathing machine)  · Values history: This document has questions about your views, beliefs, and how you feel and think about life  This information can help others choose the care that you would choose  Why are advance directives important? An advance directive helps you control your care  Although spoken wishes may be used, it is better to have your wishes written down  Spoken wishes can be misunderstood, or not followed  Treatments may be given even if you do not want them  An advance directive may make it easier for your family to make difficult choices about your care     Weight Management   Why it is important to manage your weight:  Being overweight increases your risk of health conditions such as heart disease, high blood pressure, type 2 diabetes, and certain types of cancer  It can also increase your risk for osteoarthritis, sleep apnea, and other respiratory problems  Aim for a slow, steady weight loss  Even a small amount of weight loss can lower your risk of health problems  How to lose weight safely:  A safe and healthy way to lose weight is to eat fewer calories and get regular exercise  You can lose up about 1 pound a week by decreasing the number of calories you eat by 500 calories each day  Healthy meal plan for weight management:  A healthy meal plan includes a variety of foods, contains fewer calories, and helps you stay healthy  A healthy meal plan includes the following:  · Eat whole-grain foods more often  A healthy meal plan should contain fiber  Fiber is the part of grains, fruits, and vegetables that is not broken down by your body  Whole-grain foods are healthy and provide extra fiber in your diet  Some examples of whole-grain foods are whole-wheat breads and pastas, oatmeal, brown rice, and bulgur  · Eat a variety of vegetables every day  Include dark, leafy greens such as spinach, kale, chet greens, and mustard greens  Eat yellow and orange vegetables such as carrots, sweet potatoes, and winter squash  · Eat a variety of fruits every day  Choose fresh or canned fruit (canned in its own juice or light syrup) instead of juice  Fruit juice has very little or no fiber  · Eat low-fat dairy foods  Drink fat-free (skim) milk or 1% milk  Eat fat-free yogurt and low-fat cottage cheese  Try low-fat cheeses such as mozzarella and other reduced-fat cheeses  · Choose meat and other protein foods that are low in fat  Choose beans or other legumes such as split peas or lentils  Choose fish, skinless poultry (chicken or turkey), or lean cuts of red meat (beef or pork)  Before you cook meat or poultry, cut off any visible fat  · Use less fat and oil  Try baking foods instead of frying them  Add less fat, such as margarine, sour cream, regular salad dressing and mayonnaise to foods  Eat fewer high-fat foods  Some examples of high-fat foods include french fries, doughnuts, ice cream, and cakes  · Eat fewer sweets  Limit foods and drinks that are high in sugar  This includes candy, cookies, regular soda, and sweetened drinks  Exercise:  Exercise at least 30 minutes per day on most days of the week  Some examples of exercise include walking, biking, dancing, and swimming  You can also fit in more physical activity by taking the stairs instead of the elevator or parking farther away from stores  Ask your healthcare provider about the best exercise plan for you  © Copyright [employer] 2018 Information is for End User's use only and may not be sold, redistributed or otherwise used for commercial purposes   All illustrations and images included in CareNotes® are the copyrighted property of [employer]  or [address] [place] [address]

## 2021-01-18 NOTE — ASSESSMENT & PLAN NOTE
Fasting blood sugars elevated, hemoglobin A1c is in the range of prediabetes    Again patient is hoping to see some improvement with her bariatric program   Will check a fasting blood sugar hemoglobin A1c with her next visit

## 2021-03-09 DIAGNOSIS — I10 ESSENTIAL HYPERTENSION: ICD-10-CM

## 2021-03-09 RX ORDER — VALSARTAN AND HYDROCHLOROTHIAZIDE 320; 12.5 MG/1; MG/1
1 TABLET, FILM COATED ORAL DAILY
Qty: 30 TABLET | Refills: 5 | Status: SHIPPED | OUTPATIENT
Start: 2021-03-09 | End: 2021-09-07

## 2021-07-19 ENCOUNTER — OFFICE VISIT (OUTPATIENT)
Dept: INTERNAL MEDICINE CLINIC | Facility: CLINIC | Age: 66
End: 2021-07-19
Payer: MEDICARE

## 2021-07-19 VITALS
WEIGHT: 201 LBS | DIASTOLIC BLOOD PRESSURE: 72 MMHG | BODY MASS INDEX: 39.46 KG/M2 | RESPIRATION RATE: 16 BRPM | HEART RATE: 84 BPM | OXYGEN SATURATION: 93 % | SYSTOLIC BLOOD PRESSURE: 144 MMHG | HEIGHT: 60 IN | TEMPERATURE: 98.4 F

## 2021-07-19 DIAGNOSIS — E55.9 VITAMIN D DEFICIENCY: ICD-10-CM

## 2021-07-19 DIAGNOSIS — Z00.00 MEDICARE ANNUAL WELLNESS VISIT, INITIAL: ICD-10-CM

## 2021-07-19 DIAGNOSIS — E78.00 PURE HYPERCHOLESTEROLEMIA: ICD-10-CM

## 2021-07-19 DIAGNOSIS — I10 ESSENTIAL HYPERTENSION: ICD-10-CM

## 2021-07-19 DIAGNOSIS — G40.909 SEIZURE DISORDER (HCC): ICD-10-CM

## 2021-07-19 DIAGNOSIS — R73.9 HYPERGLYCEMIA: ICD-10-CM

## 2021-07-19 DIAGNOSIS — Z12.11 COLON CANCER SCREENING: Primary | ICD-10-CM

## 2021-07-19 DIAGNOSIS — E66.01 MORBID OBESITY WITH BMI OF 40.0-44.9, ADULT (HCC): ICD-10-CM

## 2021-07-19 DIAGNOSIS — E66.09 CLASS 2 OBESITY DUE TO EXCESS CALORIES WITHOUT SERIOUS COMORBIDITY WITH BODY MASS INDEX (BMI) OF 39.0 TO 39.9 IN ADULT: ICD-10-CM

## 2021-07-19 DIAGNOSIS — E03.9 ACQUIRED HYPOTHYROIDISM: ICD-10-CM

## 2021-07-19 PROBLEM — E66.812 CLASS 2 OBESITY DUE TO EXCESS CALORIES IN ADULT: Status: ACTIVE | Noted: 2019-01-25

## 2021-07-19 PROBLEM — E66.813 CLASS 3 SEVERE OBESITY DUE TO EXCESS CALORIES WITH SERIOUS COMORBIDITY IN ADULT (HCC): Status: RESOLVED | Noted: 2020-02-17 | Resolved: 2021-07-19

## 2021-07-19 PROCEDURE — 99214 OFFICE O/P EST MOD 30 MIN: CPT | Performed by: INTERNAL MEDICINE

## 2021-07-19 RX ORDER — IBUPROFEN 200 MG
200 TABLET ORAL EVERY 6 HOURS PRN
COMMUNITY

## 2021-07-19 RX ORDER — ERGOCALCIFEROL (VITAMIN D2) 50 MCG
CAPSULE ORAL
COMMUNITY

## 2021-07-19 NOTE — ASSESSMENT & PLAN NOTE
Patient is up-to-date with colon cancer screening  States that she is scheduled for repeat colonoscopy within the next few months  Patient denies any change her bowel habits  No black stools or blood in the stools

## 2021-07-19 NOTE — ASSESSMENT & PLAN NOTE
TSH is slightly decreased, T4 is normal   We will continue present supplements and continue to monitor her thyroid function, make adjustment to medication in the future if needed

## 2021-07-19 NOTE — ASSESSMENT & PLAN NOTE
With the patient's BMI she is considered obese  She has had substantial weight loss since her last visit, 31 lb over the past 6 months and she is commended for this with her diet and exercise program continues present program and hopes to see improvement in her weight

## 2021-07-19 NOTE — ASSESSMENT & PLAN NOTE
Status post resection of large min ago  Has done well postoperatively  Does have history of seizure disorder which promoted resection of the meningioma  Patient will continue to follow-up with her neurosurgeon  No evidence of recurrence of disease

## 2021-07-19 NOTE — ASSESSMENT & PLAN NOTE
Patient has a history of vitamin-D deficiency  Continues to take her vitamin-D supplements as directed

## 2021-07-19 NOTE — ASSESSMENT & PLAN NOTE
Patient did have labs performed prior to the visit  Sugars are under control which has been with the patient working at weight loss    We will continue to monitor this and repeat studies when she returns to the office in 6 months for physical

## 2021-07-19 NOTE — ASSESSMENT & PLAN NOTE
Patient's blood pressure is baseline from previous recordings     She was hoping with her weight loss 31 lb that she may have some improvement with blood pressure readings  She has concurred urged to continue with her present diet and hopefully will continue to see some improvement with blood pressure readings with the weight loss  Patient is compliant with medication as noted  Renal function is stable

## 2021-07-19 NOTE — ASSESSMENT & PLAN NOTE
History of hyperlipidemia  Patient remains on Crestor at 5 mg daily  Again she states she has been working extremely hard on her diet to promote weight loss and watching her intake fats and cholesterol with her diet  Check another lipid profile when she returns to the office in 6 months

## 2021-07-19 NOTE — ASSESSMENT & PLAN NOTE
Patient remains on oral seizure medication  No recurrent seizure activity  Patient continues to follow-up with Neurology

## 2021-07-19 NOTE — PROGRESS NOTES
Assessment/Plan:    Essential hypertension  Patient's blood pressure is baseline from previous recordings     She was hoping with her weight loss 31 lb that she may have some improvement with blood pressure readings  She has concurred urged to continue with her present diet and hopefully will continue to see some improvement with blood pressure readings with the weight loss  Patient is compliant with medication as noted  Renal function is stable  Colon cancer screening    Patient is up-to-date with colon cancer screening  States that she is scheduled for repeat colonoscopy within the next few months  Patient denies any change her bowel habits  No black stools or blood in the stools  Hyperglycemia    Patient did have labs performed prior to the visit  Sugars are under control which has been with the patient working at weight loss  We will continue to monitor this and repeat studies when she returns to the office in 6 months for physical     Hyperlipemia    History of hyperlipidemia  Patient remains on Crestor at 5 mg daily  Again she states she has been working extremely hard on her diet to promote weight loss and watching her intake fats and cholesterol with her diet  Check another lipid profile when she returns to the office in 6 months  Hypothyroidism    TSH is slightly decreased, T4 is normal   We will continue present supplements and continue to monitor her thyroid function, make adjustment to medication in the future if needed  Meningioma, cerebral ([facility] )    Status post resection of large min ago  Has done well postoperatively  Does have history of seizure disorder which promoted resection of the meningioma  Patient will continue to follow-up with her neurosurgeon  No evidence of recurrence of disease  Seizure disorder [facility])    Patient remains on oral seizure medication  No recurrent seizure activity  Patient continues to follow-up with Neurology      Vitamin D deficiency    Patient has a history of vitamin-D deficiency  Continues to take her vitamin-D supplements as directed  Class 2 obesity due to excess calories in adult   With the patient's BMI she is considered obese  She has had substantial weight loss since her last visit, 31 lb over the past 6 months and she is commended for this with her diet and exercise program continues present program and hopes to see improvement in her weight  Diagnoses and all orders for this visit:    Colon cancer screening  -     Ambulatory referral to Colorectal Surgery; Future    Essential hypertension  -     Comprehensive metabolic panel; Future  -     CBC and differential; Future  -     Lipid panel; Future  -     UA (URINE) with reflex to Scope; Future    Hyperglycemia  -     Hemoglobin A1C; Future    Pure hypercholesterolemia  -     Lipid panel; Future    Vitamin D deficiency  -     Vitamin D 25 hydroxy; Future    Medicare annual wellness visit, initial  -     Comprehensive metabolic panel; Future  -     CBC and differential; Future  -     Lipid panel; Future  -     UA (URINE) with reflex to Scope; Future  -     Hemoglobin A1C; Future  -     TSH, 3rd generation with Free T4 reflex; Future  -     Vitamin D 25 hydroxy; Future    Morbid obesity with BMI of 40 0-44 9, adult (HCC)    Seizure disorder (HCC)    Acquired hypothyroidism    Class 2 obesity due to excess calories without serious comorbidity with body mass index (BMI) of 39 0 to 39 9 in adult    Other orders  -     Vitamin D, Ergocalciferol, 50 MCG (2000 UT) CAPS; Take by mouth  -     ibuprofen (MOTRIN) 200 mg tablet; Take 200 mg by mouth every 6 (six) hours as needed for mild pain          Subjective:      Patient ID: Fawad Reyes is a 73 y o  female  Patient is a 71-year-old female history of multiple medical problems as outlined previously  Patient is here today for routine follow-up  She did have labs performed prior to the visit today and we did discuss the results    Patient states in general she is doing well is very proud of the fact that she lost 31 lb since her last visit and she continues with her weight loss diet  She has no new medical complaints or concerns      The following portions of the patient's history were reviewed and updated as appropriate:   She  has a past medical history of Arthritis (2008), Disease of thyroid gland (1976), and Hypertension (2004)  She   Patient Active Problem List    Diagnosis Date Noted    Colon cancer screening 10/16/2020    Meningioma, cerebral ([facility] ) 11/18/2019    Seizure disorder ([facility] ) 11/18/2019    Acute bacterial conjunctivitis of right eye 07/15/2019    Class 2 obesity due to excess calories in adult 01/25/2019    Hyperglycemia 08/23/2016    Hyperlipemia 08/23/2016    Hypothyroidism 05/27/2016    Vitamin D deficiency 05/27/2016    Essential hypertension 05/27/2015     She  has a past surgical history that includes Brain surgery (11/8/2019)  Her family history includes Arthritis in her mother; Diabetes in her mother; Heart disease in her father; Hypertension in her mother  She  reports that she quit smoking about 13 years ago  Her smoking use included cigarettes  She has a 25 00 pack-year smoking history  She has never used smokeless tobacco  She reports that she does not drink alcohol and does not use drugs    Current Outpatient Medications   Medication Sig Dispense Refill    amLODIPine (NORVASC) 10 mg tablet TAKE 1 TABLET(10 MG) BY MOUTH DAILY 90 tablet 3    famotidine (PEPCID) 20 mg tablet TAKE 1 TABLET(20 MG) BY MOUTH TWICE DAILY 180 tablet 3    ibuprofen (MOTRIN) 200 mg tablet Take 200 mg by mouth every 6 (six) hours as needed for mild pain      levETIRAcetam (KEPPRA) 250 mg tablet TK 1 T PO BID      levothyroxine 200 mcg tablet Take 1 tablet (200 mcg total) by mouth daily in the early morning 90 tablet 2    rosuvastatin (CRESTOR) 5 mg tablet TAKE 1 TABLET BY MOUTH EVERY DAY 90 tablet 3    valsartan-hydrochlorothiazide (DIOVAN-HCT) 320-12 5 MG per tablet TAKE 1 TABLET BY MOUTH DAILY 30 tablet 5    Vitamin D, Ergocalciferol, 50 MCG (2000 UT) CAPS Take by mouth      ALPRAZolam (XANAX) 0 25 mg tablet Take 1 tablet (0 25 mg total) by mouth daily at bedtime as needed for sleep (Patient not taking: Reported on 7/19/2021) 30 tablet 0     No current facility-administered medications for this visit  Current Outpatient Medications on File Prior to Visit   Medication Sig    amLODIPine (NORVASC) 10 mg tablet TAKE 1 TABLET(10 MG) BY MOUTH DAILY    famotidine (PEPCID) 20 mg tablet TAKE 1 TABLET(20 MG) BY MOUTH TWICE DAILY    ibuprofen (MOTRIN) 200 mg tablet Take 200 mg by mouth every 6 (six) hours as needed for mild pain    levETIRAcetam (KEPPRA) 250 mg tablet TK 1 T PO BID    levothyroxine 200 mcg tablet Take 1 tablet (200 mcg total) by mouth daily in the early morning    rosuvastatin (CRESTOR) 5 mg tablet TAKE 1 TABLET BY MOUTH EVERY DAY    valsartan-hydrochlorothiazide (DIOVAN-HCT) 320-12 5 MG per tablet TAKE 1 TABLET BY MOUTH DAILY    Vitamin D, Ergocalciferol, 50 MCG (2000 UT) CAPS Take by mouth    ALPRAZolam (XANAX) 0 25 mg tablet Take 1 tablet (0 25 mg total) by mouth daily at bedtime as needed for sleep (Patient not taking: Reported on 7/19/2021)     No current facility-administered medications on file prior to visit  She has No Known Allergies       Review of Systems   Constitutional: Negative  HENT: Negative  Eyes: Negative  Test had eye exam and some change in her prescription   Respiratory: Negative  Cardiovascular: Negative  Gastrointestinal: Negative  Endocrine: Negative  Genitourinary: Negative  Musculoskeletal: Negative  Skin: Negative  Allergic/Immunologic: Negative  Neurological: Negative  Hematological: Negative  Psychiatric/Behavioral: Negative            Objective:      /72 (BP Location: Left arm, Patient Position: Sitting) Pulse 84   Temp 98 4 °F (36 9 °C)   Resp 16   Ht 5' (1 524 m)   Wt 91 2 kg (201 lb)   SpO2 93%   BMI 39 26 kg/m²          Physical Exam  Vitals and nursing note reviewed  Constitutional:       General: She is not in acute distress  Appearance: Normal appearance  She is obese  She is not ill-appearing, toxic-appearing or diaphoretic  Comments: Very pleasant obese 81-year-old female who is awake alert in no acute distress and oriented x3   HENT:      Head: Normocephalic and atraumatic  Right Ear: Tympanic membrane, ear canal and external ear normal  There is no impacted cerumen  Left Ear: Tympanic membrane, ear canal and external ear normal  There is no impacted cerumen  Nose: Nose normal  No congestion or rhinorrhea  Mouth/Throat:      Mouth: Mucous membranes are moist       Pharynx: Oropharynx is clear  No oropharyngeal exudate or posterior oropharyngeal erythema  Eyes:      General: No scleral icterus  Right eye: No discharge  Left eye: No discharge  Extraocular Movements: Extraocular movements intact  Conjunctiva/sclera: Conjunctivae normal       Pupils: Pupils are equal, round, and reactive to light  Neck:      Vascular: No carotid bruit  Cardiovascular:      Rate and Rhythm: Normal rate and regular rhythm  Pulses: Normal pulses  Heart sounds: Normal heart sounds  No murmur heard  No friction rub  No gallop  Pulmonary:      Effort: Pulmonary effort is normal  No respiratory distress  Breath sounds: Normal breath sounds  No stridor  No wheezing, rhonchi or rales  Chest:      Chest wall: No tenderness  Abdominal:      General: Bowel sounds are normal  There is no distension  Palpations: Abdomen is soft  There is no mass  Tenderness: There is no abdominal tenderness  There is no right CVA tenderness, left CVA tenderness, guarding or rebound  Hernia: No hernia is present        Comments: Obese   Musculoskeletal: General: No swelling, tenderness, deformity or signs of injury  Normal range of motion  Cervical back: Normal range of motion and neck supple  No rigidity or tenderness  Right lower leg: No edema  Left lower leg: No edema  Lymphadenopathy:      Cervical: No cervical adenopathy  Skin:     General: Skin is warm and dry  Capillary Refill: Capillary refill takes less than 2 seconds  Coloration: Skin is not jaundiced or pale  Findings: No bruising, erythema, lesion or rash  Neurological:      General: No focal deficit present  Mental Status: She is alert and oriented to person, place, and time  Mental status is at baseline  Cranial Nerves: No cranial nerve deficit  Sensory: No sensory deficit  Motor: No weakness  Coordination: Coordination normal       Gait: Gait normal       Deep Tendon Reflexes: Reflexes normal    Psychiatric:         Mood and Affect: Mood normal          Behavior: Behavior normal          Thought Content: Thought content normal          Judgment: Judgment normal          BMI Counseling: Body mass index is 39 26 kg/m²  The BMI is above normal  Nutrition recommendations include reducing portion sizes, decreasing overall calorie intake and 3-5 servings of fruits/vegetables daily  Exercise recommendations include exercising 3-5 times per week

## 2021-07-19 NOTE — PATIENT INSTRUCTIONS
Heart Healthy Diet   AMBULATORY CARE:   A heart healthy diet  is an eating plan low in unhealthy fats and sodium (salt)  The plan is high in healthy fats and fiber  A heart healthy diet helps improve your cholesterol levels and lowers your risk for heart disease and stroke  A dietitian will teach you how to read and understand food labels  Heart healthy diet guidelines to follow:   · Choose foods that contain healthy fats  ? Unsaturated fats  include monounsaturated and polyunsaturated fats  Unsaturated fat is found in foods such as soybean, canola, olive, corn, and safflower oils  It is also found in soft tub margarine that is made with liquid vegetable oil  ? Omega-3 fat  is found in certain fish, such as salmon, tuna, and trout, and in walnuts and flaxseed  Eat fish high in omega-3 fats at least 2 times a week  · Get 20 to 30 grams of fiber each day  Fruits, vegetables, whole-grain foods, and legumes (cooked beans) are good sources of fiber  · Limit or do not have unhealthy fats  ? Cholesterol  is found in animal foods, such as eggs and lobster, and in dairy products made from whole milk  Limit cholesterol to less than 200 mg each day  ? Saturated fat  is found in meats, such as almendarez and hamburger  It is also found in chicken or turkey skin, whole milk, and butter  Limit saturated fat to less than 7% of your total daily calories  ? Trans fat  is found in packaged foods, such as potato chips and cookies  It is also in hard margarine, some fried foods, and shortening  Do not eat foods that contain trans fats  · Limit sodium as directed  You may be told to limit sodium to 2,000 to 2,300 mg each day  Choose low-sodium or no-salt-added foods  Add little or no salt to food you prepare  Use herbs and spices in place of salt         Include the following in your heart healthy plan:  Ask your dietitian or healthcare provider how many servings to have from each of the following food groups:  · Grains:      ? Whole-wheat breads, cereals, and pastas, and brown rice    ? Low-fat, low-sodium crackers and chips    · Vegetables:      ? Broccoli, green beans, green peas, and spinach    ? Collards, kale, and lima beans    ? Carrots, sweet potatoes, tomatoes, and peppers    ? Canned vegetables with no salt added    · Fruits:      ? Bananas, peaches, pears, and pineapple    ? Grapes, raisins, and dates    ? Oranges, tangerines, grapefruit, orange juice, and grapefruit juice    ? Apricots, mangoes, melons, and papaya    ? Raspberries and strawberries    ? Canned fruit with no added sugar    · Low-fat dairy:      ? Nonfat (skim) milk, 1% milk, and low-fat almond, cashew, or soy milks fortified with calcium    ? Low-fat cheese, regular or frozen yogurt, and cottage cheese    · Meats and proteins:      ? Lean cuts of beef and pork (loin, leg, round), skinless chicken and turkey    ? Legumes, soy products, egg whites, or nuts    Limit or do not include the following in your heart healthy plan:   · Unhealthy fats and oils:      ? Whole or 2% milk, cream cheese, sour cream, or cheese    ? High-fat cuts of beef (T-bone steaks, ribs), chicken or turkey with skin, and organ meats such as liver    ? Butter, stick margarine, shortening, and cooking oils such as coconut or palm oil    · Foods and liquids high in sodium:      ? Packaged foods, such as frozen dinners, cookies, macaroni and cheese, and cereals with more than 300 mg of sodium per serving    ? Vegetables with added sodium, such as instant potatoes, vegetables with added sauces, or regular canned vegetables    ? Cured or smoked meats, such as hot dogs, almendarez, and sausage    ? High-sodium ketchup, barbecue sauce, salad dressing, pickles, olives, soy sauce, or miso    · Foods and liquids high in sugar:      ? Candy, cake, cookies, pies, or doughnuts    ? Soft drinks (soda), sports drinks, or sweetened tea    ?  Canned or dry mixes for cakes, soups, sauces, or gravies    Other healthy heart guidelines:   · Do not smoke  Nicotine and other chemicals in cigarettes and cigars can cause lung and heart damage  Ask your healthcare provider for information if you currently smoke and need help to quit  E-cigarettes or smokeless tobacco still contain nicotine  Talk to your healthcare provider before you use these products  · Limit or do not drink alcohol as directed  Alcohol can damage your heart and raise your blood pressure  Your healthcare provider may give you specific daily and weekly limits  The general recommended limit is 1 drink a day for women 21 or older and for men 72 or older  Do not have more than 3 drinks in a day or 7 in a week  The recommended limit is 2 drinks a day for men 24to 59years of age  Do not have more than 4 drinks in a day or 14 in a week  A drink of alcohol is 12 ounces of beer, 5 ounces of wine, or 1½ ounces of liquor  · Exercise regularly  Exercise can help you maintain a healthy weight and improve your blood pressure and cholesterol levels  Regular exercise can also decrease your risk for heart problems  Ask your healthcare provider about the best exercise plan for you  Do not start an exercise program without asking your healthcare provider  Follow up with your doctor or cardiologist as directed:  Write down your questions so you remember to ask them during your visits  © Copyright [address] Information is for End User's use only and may not be sold, redistributed or otherwise used for commercial purposes  All illustrations and images included in CareNotes® are the copyrighted property of A D A M , Inc  or [address] [place], [place] [address]   The above information is an  only  It is not intended as medical advice for individual conditions or treatments  Talk to your doctor, nurse or pharmacist before following any medical regimen to see if it is safe and effective for you      Calorie Counting Diet   WHAT YOU NEED TO KNOW:   What is a calorie counting diet? It is a meal plan based on counting calories each day to reach a healthy body weight  You will need to eat fewer calories if you are trying to lose weight  Weight loss may decrease your risk for certain health problems or improve your health if you have health problems  Some of these health problems include heart disease, high blood pressure, and diabetes  What foods should I avoid? Your dietitian will tell you if you need to avoid certain foods based on your body weight and health condition  You may need to avoid high-fat foods if you are at risk for or have heart disease  You may need to eat fewer foods from the breads and starches food group if you have diabetes  How many calories are in foods? The following is a list of foods and drinks with the approximate number of calories in each  Check the food label to find the exact number of calories  A dietitian can tell you how many calories you should have from each food group each day  · Carbohydrate:      ? ½ of a 3-inch bagel, 1 slice of bread, or ½ of a hamburger bun or hot dog bun (80)    ? 1 (8-inch) flour tortilla or ½ cup of cooked rice (100)    ? 1 (6-inch) corn tortilla (80)    ? 1 (6-inch) pancake or 1 cup of bran flakes cereal (110)    ? ½ cup of cooked cereal (80)    ? ½ cup of cooked pasta (85)    ? 1 ounce of pretzels (100)    ? 3 cups of air-popped popcorn without butter or oil (80)    · Dairy:      ? 1 cup of skim or 1% milk (90)    ? 1 cup of 2% milk (120)    ? 1 cup of whole milk (160)    ? 1 cup of 2% chocolate milk (220)    ? 1 ounce of low-fat cheese with 3 grams of fat per ounce (70)    ? 1 ounce of cheddar cheese (114)    ? ½ cup of 1% fat cottage cheese (80)    ? 1 cup of plain or sugar-free, fat-free yogurt (90)    · Protein foods:      ? 3 ounces of fish (not breaded or fried) (95)    ? 3 ounces of breaded, fried fish (195)    ? ¾ cup of tuna canned in water (105)    ?  3 ounces of chicken breast without skin (105)    ? 1 fried chicken breast with skin (350)    ? ¼ cup of fat free egg substitute (40)    ? 1 large egg (75)    ? 3 ounces of lean beef or pork (165)    ? 3 ounces of fried pork chop or ham (185)    ? ½ cup of cooked dried beans, such as kidney, mike, lentils, or navy (115)    ? 3 ounces of bologna or lunch meat (225)    ? 2 links of breakfast sausage (140)    · Vegetables:      ? ½ cup of sliced mushrooms (10)    ? 1 cup of salad greens, such as lettuce, spinach, or luis (15)    ? ½ cup of steamed asparagus (20)    ? ½ cup of cooked summer squash, zucchini squash, or green or wax beans (25)    ? 1 cup of broccoli or cauliflower florets, or 1 medium tomato (25)    ? 1 large raw carrot or ½ cup of cooked carrots (40)    ? ? of a medium cucumber or 1 stalk of celery (5)    ? 1 small baked potato (160)    ? 1 cup of breaded, fried vegetables (230)    · Fruit:      ? 1 (6-inch) banana (55)     ? ½ of a 4-inch grapefruit (55)    ? 15 grapes (60)    ? 1 medium orange or apple (70)    ? 1 large peach (65)    ? 1 cup of fresh pineapple chunks (75)    ? 1 cup of melon cubes (50)    ? 1¼ cups of whole strawberries (45)    ? ½ cup of fruit canned in juice (55)    ? ½ cup of fruit canned in heavy syrup (110)    ? ? cup of raisins (130)    ? ½ cup of unsweetened fruit juice (60)    ? ½ cup of grape, cranberry, or prune juice (90)    · Fat:      ? 10 peanuts or 2 teaspoons of peanut butter (55)    ? 2 tablespoons of avocado or 1 tablespoon of regular salad dressing (45)    ? 2 slices of almendarez (90)    ? 1 teaspoon of oil, such as safflower, canola, corn, or olive oil (45)    ? 2 teaspoons of low-fat margarine, or 1 tablespoon of low-fat mayonnaise (50)    ? 1 teaspoon of regular margarine (40)    ? 1 tablespoon of regular mayonnaise (135)    ? 1 tablespoon of cream cheese or 2 tablespoons of low-fat cream cheese (45)    ?  2 tablespoons of vegetable shortening (215)    · Dessert and sweets:      ? 8 animal crackers or 5 vanilla wafers (80)    ? 1 frozen fruit juice bar (80)    ? ½ cup of ice milk or low-fat frozen yogurt (90)    ? ½ cup of sherbet or sorbet (125)    ? ½ cup of sugar-free pudding or custard (60)    ? ½ cup of ice cream (140)    ? ½ cup of pudding or custard (175)    ? 1 (2-inch) square chocolate brownie (185)    · Combination foods:      ? Bean burrito made with an 8-inch tortilla, without cheese (275)    ? Chicken breast sandwich with lettuce and tomato (325)    ? 1 cup of chicken noodle soup (60)    ? 1 beef taco (175)    ? Regular hamburger with lettuce and tomato (310)    ? Regular cheeseburger with lettuce and tomato (410)     ? ¼ of a 12-inch cheese pizza (280)    ? Fried fish sandwich with lettuce and tomato (425)    ? Hot dog and bun (275)    ? 1½ cups of macaroni and cheese (310)    ? Taco salad with a fried tortilla shell (870)    · Low-calorie foods:      ? 1 tablespoon of ketchup or 1 tablespoon of fat free sour cream (15)    ? 1 teaspoon of mustard (5)    ? ¼ cup of salsa (20)    ? 1 large dill pickle (15)    ? 1 tablespoon of fat free salad dressing (10)    ? 2 teaspoons of low-sugar, light jam or jelly, or 1 tablespoon of sugar-free syrup (15)    ? 1 sugar-free popsicle (15)    ? 1 cup of club soda, seltzer water, or diet soda (0)    CARE AGREEMENT:   You have the right to help plan your care  Discuss treatment options with your healthcare provider to decide what care you want to receive  You always have the right to refuse treatment  The above information is an  only  It is not intended as medical advice for individual conditions or treatments  Talk to your doctor, nurse or pharmacist before following any medical regimen to see if it is safe and effective for you  © Copyright [address] Information is for End User's use only and may not be sold, redistributed or otherwise used for commercial purposes   All illustrations and images included in [employer] are the copyrighted property of A D [employer] M , Inc  or [address] [place], [place] [id number]

## 2021-08-14 DIAGNOSIS — E03.9 ACQUIRED HYPOTHYROIDISM: ICD-10-CM

## 2021-08-14 DIAGNOSIS — I10 ESSENTIAL HYPERTENSION: ICD-10-CM

## 2021-08-14 DIAGNOSIS — E78.00 PURE HYPERCHOLESTEROLEMIA: ICD-10-CM

## 2021-08-16 RX ORDER — ROSUVASTATIN CALCIUM 5 MG/1
TABLET, COATED ORAL
Qty: 90 TABLET | Refills: 3 | Status: SHIPPED | OUTPATIENT
Start: 2021-08-16 | End: 2022-07-03

## 2021-08-16 RX ORDER — LEVOTHYROXINE SODIUM 0.2 MG/1
TABLET ORAL
Qty: 30 TABLET | Refills: 3 | Status: SHIPPED | OUTPATIENT
Start: 2021-08-16 | End: 2021-12-08

## 2021-08-16 RX ORDER — AMLODIPINE BESYLATE 10 MG/1
TABLET ORAL
Qty: 90 TABLET | Refills: 3 | Status: SHIPPED | OUTPATIENT
Start: 2021-08-16 | End: 2022-08-08

## 2021-09-06 DIAGNOSIS — I10 ESSENTIAL HYPERTENSION: ICD-10-CM

## 2021-09-07 RX ORDER — VALSARTAN AND HYDROCHLOROTHIAZIDE 320; 12.5 MG/1; MG/1
TABLET, FILM COATED ORAL
Qty: 30 TABLET | Refills: 2 | Status: SHIPPED | OUTPATIENT
Start: 2021-09-07 | End: 2021-12-08

## 2021-10-04 DIAGNOSIS — K29.40 ATROPHIC GASTRITIS WITHOUT HEMORRHAGE: ICD-10-CM

## 2021-10-04 RX ORDER — FAMOTIDINE 20 MG/1
TABLET, FILM COATED ORAL
Qty: 180 TABLET | Refills: 1 | Status: SHIPPED | OUTPATIENT
Start: 2021-10-04 | End: 2022-05-20

## 2021-11-15 ENCOUNTER — TELEPHONE (OUTPATIENT)
Dept: INTERNAL MEDICINE CLINIC | Facility: CLINIC | Age: 66
End: 2021-11-15

## 2021-11-16 ENCOUNTER — TELEPHONE (OUTPATIENT)
Dept: INTERNAL MEDICINE CLINIC | Facility: CLINIC | Age: 66
End: 2021-11-16

## 2021-11-16 DIAGNOSIS — S20.211A CONTUSION OF RIGHT CHEST WALL, INITIAL ENCOUNTER: Primary | ICD-10-CM

## 2021-11-16 RX ORDER — OXYCODONE HYDROCHLORIDE AND ACETAMINOPHEN 5; 325 MG/1; MG/1
1 TABLET ORAL EVERY 6 HOURS PRN
Qty: 30 TABLET | Refills: 0 | Status: SHIPPED | OUTPATIENT
Start: 2021-11-16 | End: 2022-01-24 | Stop reason: ALTCHOICE

## 2021-12-08 DIAGNOSIS — I10 ESSENTIAL HYPERTENSION: ICD-10-CM

## 2021-12-08 DIAGNOSIS — E03.9 ACQUIRED HYPOTHYROIDISM: ICD-10-CM

## 2021-12-08 RX ORDER — VALSARTAN AND HYDROCHLOROTHIAZIDE 320; 12.5 MG/1; MG/1
TABLET, FILM COATED ORAL
Qty: 30 TABLET | Refills: 2 | Status: SHIPPED | OUTPATIENT
Start: 2021-12-08 | End: 2022-03-04

## 2021-12-08 RX ORDER — LEVOTHYROXINE SODIUM 0.2 MG/1
TABLET ORAL
Qty: 90 TABLET | Refills: 1 | Status: SHIPPED | OUTPATIENT
Start: 2021-12-08 | End: 2022-06-14

## 2022-01-05 ENCOUNTER — TELEPHONE (OUTPATIENT)
Dept: INTERNAL MEDICINE CLINIC | Facility: CLINIC | Age: 67
End: 2022-01-05

## 2022-01-05 DIAGNOSIS — R05.8 COUGH WITH EXPOSURE TO COVID-19 VIRUS: Primary | ICD-10-CM

## 2022-01-05 DIAGNOSIS — Z20.822 COUGH WITH EXPOSURE TO COVID-19 VIRUS: Primary | ICD-10-CM

## 2022-01-05 PROCEDURE — U0005 INFEC AGEN DETEC AMPLI PROBE: HCPCS | Performed by: INTERNAL MEDICINE

## 2022-01-05 PROCEDURE — U0003 INFECTIOUS AGENT DETECTION BY NUCLEIC ACID (DNA OR RNA); SEVERE ACUTE RESPIRATORY SYNDROME CORONAVIRUS 2 (SARS-COV-2) (CORONAVIRUS DISEASE [COVID-19]), AMPLIFIED PROBE TECHNIQUE, MAKING USE OF HIGH THROUGHPUT TECHNOLOGIES AS DESCRIBED BY CMS-2020-01-R: HCPCS | Performed by: INTERNAL MEDICINE

## 2022-01-05 NOTE — TELEPHONE ENCOUNTER
Patient has sore throat since yesterday and feels awful and would like to get tested for covid  She can be reached at [phone]    Thank you

## 2022-01-24 ENCOUNTER — OFFICE VISIT (OUTPATIENT)
Dept: INTERNAL MEDICINE CLINIC | Facility: CLINIC | Age: 67
End: 2022-01-24
Payer: MEDICARE

## 2022-01-24 VITALS
SYSTOLIC BLOOD PRESSURE: 138 MMHG | OXYGEN SATURATION: 92 % | HEART RATE: 87 BPM | BODY MASS INDEX: 39.85 KG/M2 | HEIGHT: 60 IN | TEMPERATURE: 97.3 F | DIASTOLIC BLOOD PRESSURE: 72 MMHG | WEIGHT: 203 LBS

## 2022-01-24 DIAGNOSIS — I10 ESSENTIAL HYPERTENSION: ICD-10-CM

## 2022-01-24 DIAGNOSIS — E03.9 ACQUIRED HYPOTHYROIDISM: ICD-10-CM

## 2022-01-24 DIAGNOSIS — E55.9 VITAMIN D DEFICIENCY: ICD-10-CM

## 2022-01-24 DIAGNOSIS — R56.9 SEIZURE (HCC): ICD-10-CM

## 2022-01-24 DIAGNOSIS — Z00.00 MEDICARE ANNUAL WELLNESS VISIT, SUBSEQUENT: ICD-10-CM

## 2022-01-24 DIAGNOSIS — R73.9 HYPERGLYCEMIA: ICD-10-CM

## 2022-01-24 DIAGNOSIS — D32.0 MENINGIOMA, CEREBRAL (HCC): ICD-10-CM

## 2022-01-24 DIAGNOSIS — M25.561 ACUTE PAIN OF RIGHT KNEE: Primary | ICD-10-CM

## 2022-01-24 PROCEDURE — 99214 OFFICE O/P EST MOD 30 MIN: CPT | Performed by: INTERNAL MEDICINE

## 2022-01-24 PROCEDURE — G0438 PPPS, INITIAL VISIT: HCPCS | Performed by: INTERNAL MEDICINE

## 2022-01-24 NOTE — PATIENT INSTRUCTIONS
Medicare Preventive Visit Patient Instructions  Thank you for completing your Welcome to Medicare Visit or Medicare Annual Wellness Visit today  Your next wellness visit will be due in one year (1/25/2023)  The screening/preventive services that you may require over the next 5-10 years are detailed below  Some tests may not apply to you based off risk factors and/or age  Screening tests ordered at today's visit but not completed yet may show as past due  Also, please note that scanned in results may not display below  Preventive Screenings:  Service Recommendations Previous Testing/Comments   Colorectal Cancer Screening  * Colonoscopy    * Fecal Occult Blood Test (FOBT)/Fecal Immunochemical Test (FIT)  * Fecal DNA/Cologuard Test  * Flexible Sigmoidoscopy Age: 54-65 years old   Colonoscopy: every 10 years (may be performed more frequently if at higher risk)  OR  FOBT/FIT: every 1 year  OR  Cologuard: every 3 years  OR  Sigmoidoscopy: every 5 years  Screening may be recommended earlier than age 48 if at higher risk for colorectal cancer  Also, an individualized decision between you and your healthcare provider will decide whether screening between the ages of 74-80 would be appropriate  Colonoscopy: 11/01/2021  FOBT/FIT: Not on file  Cologuard: Not on file  Sigmoidoscopy: Not on file    Screening Current     Breast Cancer Screening Age: 36 years old  Frequency: every 1-2 years  Not required if history of left and right mastectomy Mammogram: 01/19/2022    Screening Current   Cervical Cancer Screening Between the ages of 21-29, pap smear recommended once every 3 years  Between the ages of 33-67, can perform pap smear with HPV co-testing every 5 years     Recommendations may differ for women with a history of total hysterectomy, cervical cancer, or abnormal pap smears in past  Pap Smear: Not on file    Screening Not Indicated   Hepatitis C Screening Once for adults born between 1945 and 1965  More frequently in patients at high risk for Hepatitis C Hep C Antibody: Not on file        Diabetes Screening 1-2 times per year if you're at risk for diabetes or have pre-diabetes Fasting glucose: No results in last 5 years   A1C: 5 6 %    Screening Current   Cholesterol Screening Once every 5 years if you don't have a lipid disorder  May order more often based on risk factors  Lipid panel: 01/15/2021    Screening Not Indicated  History Lipid Disorder     Other Preventive Screenings Covered by Medicare:  1  Abdominal Aortic Aneurysm (AAA) Screening: covered once if your at risk  You're considered to be at risk if you have a family history of AAA  2  Lung Cancer Screening: covers low dose CT scan once per year if you meet all of the following conditions: (1) Age 50-69; (2) No signs or symptoms of lung cancer; (3) Current smoker or have quit smoking within the last 15 years; (4) You have a tobacco smoking history of at least 30 pack years (packs per day multiplied by number of years you smoked); (5) You get a written order from a healthcare provider  3  Glaucoma Screening: covered annually if you're considered high risk: (1) You have diabetes OR (2) Family history of glaucoma OR (3)  aged 48 and older OR (3)  American aged 72 and older  3  Osteoporosis Screening: covered every 2 years if you meet one of the following conditions: (1) You're estrogen deficient and at risk for osteoporosis based off medical history and other findings; (2) Have a vertebral abnormality; (3) On glucocorticoid therapy for more than 3 months; (4) Have primary hyperparathyroidism; (5) On osteoporosis medications and need to assess response to drug therapy  · Last bone density test (DXA Scan): Not on file  5  HIV Screening: covered annually if you're between the age of 12-76  Also covered annually if you are younger than 13 and older than 72 with risk factors for HIV infection   For pregnant patients, it is covered up to 3 times per pregnancy  Immunizations:  Immunization Recommendations   Influenza Vaccine Annual influenza vaccination during flu season is recommended for all persons aged >= 6 months who do not have contraindications   Pneumococcal Vaccine (Prevnar and Pneumovax)  * Prevnar = PCV13  * Pneumovax = PPSV23   Adults 25-60 years old: 1-3 doses may be recommended based on certain risk factors  Adults 72 years old: Prevnar (PCV13) vaccine recommended followed by Pneumovax (PPSV23) vaccine  If already received PPSV23 since turning 65, then PCV13 recommended at least one year after PPSV23 dose  Hepatitis B Vaccine 3 dose series if at intermediate or high risk (ex: diabetes, end stage renal disease, liver disease)   Tetanus (Td) Vaccine - COST NOT COVERED BY MEDICARE PART B Following completion of primary series, a booster dose should be given every 10 years to maintain immunity against tetanus  Td may also be given as tetanus wound prophylaxis  Tdap Vaccine - COST NOT COVERED BY MEDICARE PART B Recommended at least once for all adults  For pregnant patients, recommended with each pregnancy  Shingles Vaccine (Shingrix) - COST NOT COVERED BY MEDICARE PART B  2 shot series recommended in those aged 48 and above     Health Maintenance Due:      Topic Date Due    Hepatitis C Screening  Never done    Lung Cancer Screening  Never done    Breast Cancer Screening: Mammogram  01/19/2023    Colorectal Cancer Screening  11/01/2026     Immunizations Due:      Topic Date Due    DTaP,Tdap,and Td Vaccines (1 - Tdap) 01/02/2007    Pneumococcal Vaccine: 65+ Years (1 of 1 - PPSV23) Never done    Influenza Vaccine (1) 09/01/2021     Advance Directives   What are advance directives? Advance directives are legal documents that state your wishes and plans for medical care  These plans are made ahead of time in case you lose your ability to make decisions for yourself   Advance directives can apply to any medical decision, such as the treatments you want, and if you want to donate organs  What are the types of advance directives? There are many types of advance directives, and each state has rules about how to use them  You may choose a combination of any of the following:  · Living will: This is a written record of the treatment you want  You can also choose which treatments you do not want, which to limit, and which to stop at a certain time  This includes surgery, medicine, IV fluid, and tube feedings  · Durable power of  for healthcare [facility]): This is a written record that states who you want to make healthcare choices for you when you are unable to make them for yourself  This person, called a proxy, is usually a family member or a friend  You may choose more than 1 proxy  · Do not resuscitate (DNR) order:  A DNR order is used in case your heart stops beating or you stop breathing  It is a request not to have certain forms of treatment, such as CPR  A DNR order may be included in other types of advance directives  · Medical directive: This covers the care that you want if you are in a coma, near death, or unable to make decisions for yourself  You can list the treatments you want for each condition  Treatment may include pain medicine, surgery, blood transfusions, dialysis, IV or tube feedings, and a ventilator (breathing machine)  · Values history: This document has questions about your views, beliefs, and how you feel and think about life  This information can help others choose the care that you would choose  Why are advance directives important? An advance directive helps you control your care  Although spoken wishes may be used, it is better to have your wishes written down  Spoken wishes can be misunderstood, or not followed  Treatments may be given even if you do not want them  An advance directive may make it easier for your family to make difficult choices about your care     Fall Prevention    Fall prevention includes ways to make your home and other areas safer  It also includes ways you can move more carefully to prevent a fall  Health conditions that cause changes in your blood pressure, vision, or muscle strength and coordination may increase your risk for falls  Medicines may also increase your risk for falls if they make you dizzy, weak, or sleepy  Fall prevention tips:   · Stand or sit up slowly  · Use assistive devices as directed  · Wear shoes that fit well and have soles that   · Wear a personal alarm  · Stay active  · Manage your medical conditions  Home Safety Tips:  · Add items to prevent falls in the bathroom  · Keep paths clear  · Install bright lights in your home  · Keep items you use often on shelves within reach  · Paint or place reflective tape on the edges of your stairs  Weight Management   Why it is important to manage your weight:  Being overweight increases your risk of health conditions such as heart disease, high blood pressure, type 2 diabetes, and certain types of cancer  It can also increase your risk for osteoarthritis, sleep apnea, and other respiratory problems  Aim for a slow, steady weight loss  Even a small amount of weight loss can lower your risk of health problems  How to lose weight safely:  A safe and healthy way to lose weight is to eat fewer calories and get regular exercise  You can lose up about 1 pound a week by decreasing the number of calories you eat by 500 calories each day  Healthy meal plan for weight management:  A healthy meal plan includes a variety of foods, contains fewer calories, and helps you stay healthy  A healthy meal plan includes the following:  · Eat whole-grain foods more often  A healthy meal plan should contain fiber  Fiber is the part of grains, fruits, and vegetables that is not broken down by your body  Whole-grain foods are healthy and provide extra fiber in your diet   Some examples of whole-grain foods are whole-wheat breads and pastas, oatmeal, brown rice, and bulgur  · Eat a variety of vegetables every day  Include dark, leafy greens such as spinach, kale, chet greens, and mustard greens  Eat yellow and orange vegetables such as carrots, sweet potatoes, and winter squash  · Eat a variety of fruits every day  Choose fresh or canned fruit (canned in its own juice or light syrup) instead of juice  Fruit juice has very little or no fiber  · Eat low-fat dairy foods  Drink fat-free (skim) milk or 1% milk  Eat fat-free yogurt and low-fat cottage cheese  Try low-fat cheeses such as mozzarella and other reduced-fat cheeses  · Choose meat and other protein foods that are low in fat  Choose beans or other legumes such as split peas or lentils  Choose fish, skinless poultry (chicken or turkey), or lean cuts of red meat (beef or pork)  Before you cook meat or poultry, cut off any visible fat  · Use less fat and oil  Try baking foods instead of frying them  Add less fat, such as margarine, sour cream, regular salad dressing and mayonnaise to foods  Eat fewer high-fat foods  Some examples of high-fat foods include french fries, doughnuts, ice cream, and cakes  · Eat fewer sweets  Limit foods and drinks that are high in sugar  This includes candy, cookies, regular soda, and sweetened drinks  Exercise:  Exercise at least 30 minutes per day on most days of the week  Some examples of exercise include walking, biking, dancing, and swimming  You can also fit in more physical activity by taking the stairs instead of the elevator or parking farther away from stores  Ask your healthcare provider about the best exercise plan for you  Alcohol Use and Your Health    Drinking too much can harm your health  Excessive alcohol use leads to about 88,000 death in the [place] each year, and shortens the life of those who diet by almost 30 years  Further, excessive drinking cost the economy $249 billion in 2010    Most excessive drinkers are not alcohol dependent  Excessive alcohol use has immediate effects that increase the risk of many harmful health conditions  These are most often the result of binge drinking  Over time, excessive alcohol use can lead to the development of chronic diseases and other series health problems  What is considered a "drink"? Excessive alcohol use includes:  · Binge Drinking: For women, 4 or more drinks consumed on one occasion  For men, 5 or more drinks consumed on one occasion  · Heavy Drinking: For women, 8 or more drinks per week  For men, 15 or more drinks per week  · Any alcohol used by pregnant women  · Any alcohol used by those under the age of 21 years    If you choose to drink, do so in moderation:  · Do not drink at all if you are under the age of 24, or if you are or may be pregnant, or have health problems that could be made worse by drinking    · For women, up to 1 drink per day  · For men, up to 2 drinks a day    No one should begin drinking or drink more frequently based on potential health benefits    Short-Term Health Risks:  · Injuries: motor vehicle crashes, falls, drownings, burns  · Violence: homicide, suicide, sexual assault, intimate partner violence  · Alcohol poisoning  · Reproductive health: risky sexual behaviors, unintended prengnacy, sexually transmitted diseases, miscarriage, stillbirth, fetal alcohol syndrome    Long-Term Health Risks:  · Chronic diseases: high blood pressure, heart disease, stroke, liver disease, digestive problems  · Cancers: breast, mouth and throat, liver, colon  · Learning and memory problems: dementia, poor school performance  · Mental health: depression, anxiety, insomnia  · Social problems: lost productivity, family problems, unemployment  · Alcohol dependence    For support and more information:  · Substance Abuse and [address] , [address] [place]  Web Address: https://Flirq/    · Alcoholics Anonymous        Web Address: http://www gonzalez info/    https://www cdc gov/alcohol/fact-sheets/alcohol-use htm     © Copyright [employer] 2018 Information is for End User's use only and may not be sold, redistributed or otherwise used for commercial purposes  All illustrations and images included in CareNotes® are the copyrighted property of A D A M [employer] Inc  or [address] [place] [address]    Obesity   AMBULATORY CARE:   Obesity  is when your body mass index (BMI) is greater than 30  Your healthcare provider will use your height and weight to measure your BMI  The risks of obesity include  many health problems, including injuries or physical disability  You may need tests to check for the following:  · Diabetes    · High blood pressure or high cholesterol    · Heart disease    · Stroke    · Gallbladder or liver disease    · Cancer of the colon, breast, prostate, liver, or kidney    · Sleep apnea    · Arthritis or gout    Seek care immediately if:   · You have a severe headache, confusion, or difficulty speaking  · You have weakness on one side of your body  · You have chest pain, sweating, or shortness of breath  Call your doctor if:   · You have symptoms of gallbladder or liver disease, such as pain in your upper abdomen  · You have knee or hip pain and discomfort while walking  · You have symptoms of diabetes, such as intense hunger and thirst, and frequent urination  · You have symptoms of sleep apnea, such as snoring or daytime sleepiness  · You have questions or concerns about your condition or care  Treatment for obesity  focuses on helping you lose weight to improve your health  Even a small decrease in BMI can reduce the risk for many health problems  Your healthcare provider will help you set a weight-loss goal   · Lifestyle changes  are the first step in treating obesity  These include making healthy food choices and getting regular physical activity   Your healthcare provider may suggest a weight-loss program that involves coaching, education, and therapy  · Medicine  may help you lose weight when it is used with a healthy foods and physical activity  · Surgery  can help you lose weight if you are very obese and have other health problems  There are several types of weight-loss surgery  Ask your healthcare provider for more information  Be successful losing weight:   · Set small, realistic goals  An example of a small goal is to walk for 20 minutes 5 days a week  Anther goal is to lose 5% of your body weight  · Tell friends, family members, and coworkers about your goals  and ask for their support  Ask a friend to lose weight with you, or join a weight-loss support group  · Identify foods or triggers that may cause you to overeat , and find ways to avoid them  Remove tempting high-calorie foods from your home and workplace  Place a bowl of fresh fruit on your kitchen counter  If stress causes you to eat, then find other ways to cope with stress  A counselor or therapist may be able to help you  · Keep a diary to track what you eat and drink  Also write down how many minutes of physical activity you do each day  Weigh yourself once a week and record it in your diary  Eating changes: You will need to eat 500 to 1,000 fewer calories each day than you currently eat to lose 1 to 2 pounds a week  The following changes will help you cut calories:  · Eat smaller portions  Use small plates, no larger than 9 inches in diameter  Fill your plate half full of fruits and vegetables  Measure your food using measuring cups until you know what a serving size looks like  · Eat 3 meals and 1 or 2 snacks each day  Plan your meals in advance  Avtar Watson and eat at home most of the time  Eat slowly  Do not skip meals  Skipping meals can lead to overeating later in the day  This can make it harder for you to lose weight   Talk with a dietitian to help you make a meal plan and schedule that is right for you  · Eat fruits and vegetables at every meal   They are low in calories and high in fiber, which makes you feel full  Do not add butter, margarine, or cream sauce to vegetables  Use herbs to season steamed vegetables  · Eat less fat and fewer fried foods  Eat more baked or grilled chicken and fish  These protein sources are lower in calories and fat than red meat  Limit fast food  Dress your salads with olive oil and vinegar instead of bottled dressing  · Limit the amount of sugar you eat  Do not drink sugary beverages  Limit alcohol  Activity changes:  Physical activity is good for your body in many ways  It helps you burn calories and build strong muscles  It decreases stress and depression, and improves your mood  It can also help you sleep better  Talk to your healthcare provider before you begin an exercise program   · Exercise for at least 30 minutes 5 days a week  Start slowly  Set aside time each day for physical activity that you enjoy and that is convenient for you  It is best to do both weight training and an activity that increases your heart rate, such as walking, bicycling, or swimming  · Find ways to be more active  Do yard work and housecleaning  Walk up the stairs instead of using elevators  Spend your leisure time going to events that require walking, such as outdoor festivals or fairs  This extra physical activity can help you lose weight and keep it off  Follow up with your doctor as directed: You may need to meet with a dietitian  Write down your questions so you remember to ask them during your visits  © Copyright [employer] 2021 Information is for End User's use only and may not be sold, redistributed or otherwise used for commercial purposes  All illustrations and images included in CareNotes® are the copyrighted property of [employer]  or Dalia Dwyer   The above information is an  only   It is not intended as medical advice for individual conditions or treatments  Talk to your doctor, nurse or pharmacist before following any medical regimen to see if it is safe and effective for you  Low Fat Diet   AMBULATORY CARE:   A low-fat diet  is an eating plan that is low in total fat, unhealthy fat, and cholesterol  You may need to follow a low-fat diet if you have trouble digesting or absorbing fat  You may also need to follow this diet if you have high cholesterol  You can also lower your cholesterol by increasing the amount of fiber in your diet  Soluble fiber is a type of fiber that helps to decrease cholesterol levels  Different types of fat in food:   · Limit unhealthy fats  A diet that is high in cholesterol, saturated fat, and trans fat may cause unhealthy cholesterol levels  Unhealthy cholesterol levels increase your risk of heart disease  ? Cholesterol:  Limit intake of cholesterol to less than 200 mg per day  Cholesterol is found in meat, eggs, and dairy  ? Saturated fat:  Limit saturated fat to less than 7% of your total daily calories  Ask your dietitian how many calories you need each day  Saturated fat is found in butter, cheese, ice cream, whole milk, and palm oil  Saturated fat is also found in meat, such as beef, pork, chicken skin, and processed meats  Processed meats include sausage, hot dogs, and bologna  ? Trans fat:  Avoid trans fat as much as possible  Trans fat is used in fried and baked foods  Foods that say trans fat free on the label may still have up to 0 5 grams of trans fat per serving  · Include healthy fats  Replace foods that are high in saturated and trans fat with foods high in healthy fats  This may help to decrease high cholesterol levels  ? Monounsaturated fats: These are found in avocados, nuts, and vegetable oils, such as olive, canola, and sunflower oil  ? Polyunsaturated fats:   These can be found in vegetable oils, such as soybean or corn oil  Omega-3 fats can help to decrease the risk of heart disease  Omega-3 fats are found in fish, such as salmon, herring, trout, and tuna  Omega-3 fats can also be found in plant foods, such as walnuts, flaxseed, soybeans, and canola oil  Foods to limit or avoid:   · Grains:      ? Snacks that are made with partially hydrogenated oils, such as chips, regular crackers, and butter-flavored popcorn    ? High-fat baked goods, such as biscuits, croissants, doughnuts, pies, cookies, and pastries    · Dairy:      ? Whole milk, 2% milk, and yogurt and ice cream made with whole milk    ? Half and half creamer, heavy cream, and whipping cream    ? Cheese, cream cheese, and sour cream    · Meats and proteins:      ? High-fat cuts of meat (T-bone steak, regular hamburger, and ribs)    ? Fried meat, poultry (turkey and chicken), and fish    ? Poultry (chicken and turkey) with skin    ? Cold cuts (salami or bologna), hot dogs, almendarez, and sausage    ? Whole eggs and egg yolks    · Vegetables and fruits with added fat:      ? Fried vegetables or vegetables in butter or high-fat sauces, such as cream or cheese sauces    ? Fried fruit or fruit served with butter or cream    · Fats:      ? Butter, stick margarine, and shortening    ? Coconut, palm oil, and palm kernel oil    Foods to include:   · Grains:      ? Whole-grain breads, cereals, pasta, and brown rice    ? Low-fat crackers and pretzels    · Vegetables and fruits:      ? Fresh, frozen, or canned vegetables (no salt or low-sodium)    ? Fresh, frozen, dried, or canned fruit (canned in light syrup or fruit juice)    ? Avocado    · Low-fat dairy products:      ? Nonfat (skim) or 1% milk    ? Nonfat or low-fat cheese, yogurt, and cottage cheese    · Meats and proteins:      ? Chicken or turkey with no skin    ? Baked or broiled fish    ? Lean beef and pork (loin, round, extra lean hamburger)    ? Beans and peas, unsalted nuts, soy products    ?  Egg whites and substitutes    ? Seeds and nuts    · Fats:      ? Unsaturated oil, such as canola, olive, peanut, soybean, or sunflower oil    ? Soft or liquid margarine and vegetable oil spread    ? Low-fat salad dressing    Other ways to decrease fat:   · Read food labels before you buy foods  Choose foods that have less than 30% of calories from fat  Choose low-fat or fat-free dairy products  Remember that fat free does not mean calorie free  These foods still contain calories, and too many calories can lead to weight gain  · Trim fat from meat and avoid fried food  Trim all visible fat from meat before you cook it  Remove the skin from poultry  Do not bejarano meat, fish, or poultry  Bake, roast, boil, or broil these foods instead  Avoid fried foods  Eat a baked potato instead of [place] fries  Steam vegetables instead of sautéing them in butter  · Add less fat to foods  Use imitation almendarez bits on salads and baked potatoes instead of regular almendarez bits  Use fat-free or low-fat salad dressings instead of regular dressings  Use low-fat or nonfat butter-flavored topping instead of regular butter or margarine on popcorn and other foods  Ways to decrease fat in recipes:  Replace high-fat ingredients with low-fat or nonfat ones  This may cause baked goods to be drier than usual  You may need to use nonfat cooking spray on pans to prevent food from sticking  You also may need to change the amount of other ingredients, such as water, in the recipe  Try the following:  · Use low-fat or light margarine instead of regular margarine or shortening  · Use lean ground turkey breast or chicken, or lean ground beef (less than 5% fat) instead of hamburger  · Add 1 teaspoon of canola oil to 8 ounces of skim milk instead of using cream or half and half  · Use grated zucchini, carrots, or apples in breads instead of coconut      · Use blenderized, low-fat cottage cheese, plain tofu, or low-fat ricotta cheese instead of cream cheese  · Use 1 egg white and 1 teaspoon of canola oil, or use ¼ cup (2 ounces) of fat-free egg substitute instead of a whole egg  · Replace half of the oil that is called for in a recipe with applesauce when you bake  Use 3 tablespoons of cocoa powder and 1 tablespoon of canola oil instead of a square of baking chocolate  How to increase fiber:  Eat enough high-fiber foods to get 20 to 30 grams of fiber every day  Slowly increase your fiber intake to avoid stomach cramps, gas, and other problems  · Eat 3 ounces of whole-grain foods each day  An ounce is about 1 slice of bread  Eat whole-grain breads, such as whole-wheat bread  Whole wheat, whole-wheat flour, or other whole grains should be listed as the first ingredient on the food label  Replace white flour with whole-grain flour or use half of each in recipes  Whole-grain flour is heavier than white flour, so you may have to add more yeast or baking powder  · Eat a high-fiber cereal for breakfast   Oatmeal is a good source of soluble fiber  Look for cereals that have bran or fiber in the name  Choose whole-grain products, such as brown rice, barley, and whole-wheat pasta  · Eat more beans, peas, and lentils  For example, add beans to soups or salads  Eat at least 5 cups of fruits and vegetables each day  Eat fruits and vegetables with the peel because the peel is high in fiber  © Copyright [employer] 2021 Information is for End User's use only and may not be sold, redistributed or otherwise used for commercial purposes  All illustrations and images included in CareNotes® are the copyrighted property of [employer]  or [address] [place], [place] [address]   The above information is an  only  It is not intended as medical advice for individual conditions or treatments  Talk to your doctor, nurse or pharmacist before following any medical regimen to see if it is safe and effective for you      Calorie Counting Diet   WHAT YOU NEED TO KNOW: What is a calorie counting diet? It is a meal plan based on counting calories each day to reach a healthy body weight  You will need to eat fewer calories if you are trying to lose weight  Weight loss may decrease your risk for certain health problems or improve your health if you have health problems  Some of these health problems include heart disease, high blood pressure, and diabetes  What foods should I avoid? Your dietitian will tell you if you need to avoid certain foods based on your body weight and health condition  You may need to avoid high-fat foods if you are at risk for or have heart disease  You may need to eat fewer foods from the breads and starches food group if you have diabetes  How many calories are in foods? The following is a list of foods and drinks with the approximate number of calories in each  Check the food label to find the exact number of calories  A dietitian can tell you how many calories you should have from each food group each day  · Carbohydrate:      ? ½ of a 3-inch bagel, 1 slice of bread, or ½ of a hamburger bun or hot dog bun (80)    ? 1 (8-inch) flour tortilla or ½ cup of cooked rice (100)    ? 1 (6-inch) corn tortilla (80)    ? 1 (6-inch) pancake or 1 cup of bran flakes cereal (110)    ? ½ cup of cooked cereal (80)    ? ½ cup of cooked pasta (85)    ? 1 ounce of pretzels (100)    ? 3 cups of air-popped popcorn without butter or oil (80)    · Dairy:      ? 1 cup of skim or 1% milk (90)    ? 1 cup of 2% milk (120)    ? 1 cup of whole milk (160)    ? 1 cup of 2% chocolate milk (220)    ? 1 ounce of low-fat cheese with 3 grams of fat per ounce (70)    ? 1 ounce of cheddar cheese (114)    ? ½ cup of 1% fat cottage cheese (80)    ? 1 cup of plain or sugar-free, fat-free yogurt (90)    · Protein foods:      ? 3 ounces of fish (not breaded or fried) (95)    ? 3 ounces of breaded, fried fish (195)    ? ¾ cup of tuna canned in water (105)    ?  3 ounces of chicken breast without skin (105)    ? 1 fried chicken breast with skin (350)    ? ¼ cup of fat free egg substitute (40)    ? 1 large egg (75)    ? 3 ounces of lean beef or pork (165)    ? 3 ounces of fried pork chop or ham (185)    ? ½ cup of cooked dried beans, such as kidney, mike, lentils, or navy (115)    ? 3 ounces of bologna or lunch meat (225)    ? 2 links of breakfast sausage (140)    · Vegetables:      ? ½ cup of sliced mushrooms (10)    ? 1 cup of salad greens, such as lettuce, spinach, or luis (15)    ? ½ cup of steamed asparagus (20)    ? ½ cup of cooked summer squash, zucchini squash, or green or wax beans (25)    ? 1 cup of broccoli or cauliflower florets, or 1 medium tomato (25)    ? 1 large raw carrot or ½ cup of cooked carrots (40)    ? ? of a medium cucumber or 1 stalk of celery (5)    ? 1 small baked potato (160)    ? 1 cup of breaded, fried vegetables (230)    · Fruit:      ? 1 (6-inch) banana (55)     ? ½ of a 4-inch grapefruit (55)    ? 15 grapes (60)    ? 1 medium orange or apple (70)    ? 1 large peach (65)    ? 1 cup of fresh pineapple chunks (75)    ? 1 cup of melon cubes (50)    ? 1¼ cups of whole strawberries (45)    ? ½ cup of fruit canned in juice (55)    ? ½ cup of fruit canned in heavy syrup (110)    ? ? cup of raisins (130)    ? ½ cup of unsweetened fruit juice (60)    ? ½ cup of grape, cranberry, or prune juice (90)    · Fat:      ? 10 peanuts or 2 teaspoons of peanut butter (55)    ? 2 tablespoons of avocado or 1 tablespoon of regular salad dressing (45)    ? 2 slices of almendarez (90)    ? 1 teaspoon of oil, such as safflower, canola, corn, or olive oil (45)    ? 2 teaspoons of low-fat margarine, or 1 tablespoon of low-fat mayonnaise (50)    ? 1 teaspoon of regular margarine (40)    ? 1 tablespoon of regular mayonnaise (135)    ? 1 tablespoon of cream cheese or 2 tablespoons of low-fat cream cheese (45)    ?  2 tablespoons of vegetable shortening (215)    · Dessert and sweets:      ? 8 animal crackers or 5 vanilla wafers (80)    ? 1 frozen fruit juice bar (80)    ? ½ cup of ice milk or low-fat frozen yogurt (90)    ? ½ cup of sherbet or sorbet (125)    ? ½ cup of sugar-free pudding or custard (60)    ? ½ cup of ice cream (140)    ? ½ cup of pudding or custard (175)    ? 1 (2-inch) square chocolate brownie (185)    · Combination foods:      ? Bean burrito made with an 8-inch tortilla, without cheese (275)    ? Chicken breast sandwich with lettuce and tomato (325)    ? 1 cup of chicken noodle soup (60)    ? 1 beef taco (175)    ? Regular hamburger with lettuce and tomato (310)    ? Regular cheeseburger with lettuce and tomato (410)     ? ¼ of a 12-inch cheese pizza (280)    ? Fried fish sandwich with lettuce and tomato (425)    ? Hot dog and bun (275)    ? 1½ cups of macaroni and cheese (310)    ? Taco salad with a fried tortilla shell (870)    · Low-calorie foods:      ? 1 tablespoon of ketchup or 1 tablespoon of fat free sour cream (15)    ? 1 teaspoon of mustard (5)    ? ¼ cup of salsa (20)    ? 1 large dill pickle (15)    ? 1 tablespoon of fat free salad dressing (10)    ? 2 teaspoons of low-sugar, light jam or jelly, or 1 tablespoon of sugar-free syrup (15)    ? 1 sugar-free popsicle (15)    ? 1 cup of club soda, seltzer water, or diet soda (0)    CARE AGREEMENT:   You have the right to help plan your care  Discuss treatment options with your healthcare provider to decide what care you want to receive  You always have the right to refuse treatment  The above information is an  only  It is not intended as medical advice for individual conditions or treatments  Talk to your doctor, nurse or pharmacist before following any medical regimen to see if it is safe and effective for you  © Copyright [employer] 2021 Information is for End User's use only and may not be sold, redistributed or otherwise used for commercial purposes   All illustrations and images included in CareNotes® are the copyrighted property of [employer]  or [address] [place], [place] [address]

## 2022-01-24 NOTE — ASSESSMENT & PLAN NOTE
History of meningioma presented as seizure disorder  Status post resection  States that she has no neurologic sequela    No longer on seizure medication and continues to follow-up with her specialists including Neurosurgery, repeat MRI shows no recurrence of disease

## 2022-01-24 NOTE — ASSESSMENT & PLAN NOTE
Despite taking her supplements her vitamin-D level is low    We did tell the patient to increase her vitamin-D intake a to 2000 internationally units twice a day, check a level with her next visit

## 2022-01-24 NOTE — ASSESSMENT & PLAN NOTE
Difficulty having pain in the right knee greater than the left  On evaluation patient has slight valgus deformity tenderness to the tibial plateau medially on the right  Will be going for x-rays of knees bilaterally    Suggest she see Orthopedics

## 2022-01-24 NOTE — ASSESSMENT & PLAN NOTE
TSH is slightly low, T3-T4 normal   She will continue present supplements intact level with her next visit

## 2022-01-24 NOTE — ASSESSMENT & PLAN NOTE
With the patient's BMI she is considered obese  She did have some dramatic weight loss after resection of her meningioma but at this point time her weight is stable  She does know the importance of diet and exercise  She is afraid to return to the gym at this time because the coronavirus vaccine but is trying to do some exercise at home  We did suggest also that she look very closely at her dietary intake of calories in order to promote weight loss

## 2022-01-24 NOTE — PROGRESS NOTES
Assessment and Plan:     Problem List Items Addressed This Visit     None           Preventive health issues were discussed with patient, and age appropriate screening tests were ordered as noted in patient's After Visit Summary  Personalized health advice and appropriate referrals for health education or preventive services given if needed, as noted in patient's After Visit Summary  History of Present Illness:     Patient presents for Medicare Annual Wellness visit    Patient Care Team:  Kinjal Duggan DO as PCP - General     Problem List:     Patient Active Problem List   Diagnosis    Essential hypertension    Hyperglycemia    Hyperlipemia    Hypothyroidism    Class 2 obesity due to excess calories in adult    Acute bacterial conjunctivitis of right eye    Vitamin D deficiency    [facility] [address] [facility]    Seizure disorder ([facility] [facility]    Colon cancer screening    Contusion of right chest wall      Past Medical and Surgical History:     Past Medical History:   Diagnosis Date    Arthritis 2008    Disease of thyroid gland 1976    Hypertension      Past Surgical History:   Procedure Laterality Date    BRAIN SURGERY  2019    Benign lt  Parietal Meningioma      Family History:     Family History   Problem Relation Age of Onset    Heart disease Father     Hypertension Mother     Diabetes Mother         Latent    Arthritis Mother       Social History:     Social History     Socioeconomic History    Marital status: Single     Spouse name: None    Number of children: None    Years of education: None    Highest education level: None   Occupational History    None   Tobacco Use    Smoking status: Former Smoker     Packs/day: 1 00     Years: 25 00     Pack years: 25 00     Types: Cigarettes     Quit date: 2007     Years since quittin 1    Smokeless tobacco: Never Used    Tobacco comment: Quit off & on up until    Haven't smoked since   Substance and Sexual Activity    Alcohol use: No    Drug use: Never    Sexual activity: Not Currently     Partners: Male     Birth control/protection: None   Other Topics Concern    None   Social History Narrative    None     Social Determinants of Health     Financial Resource Strain: Not on file   Food Insecurity: Not on file   Transportation Needs: Not on file   Physical Activity: Not on file   Stress: Not on file   Social Connections: Not on file   Intimate Partner Violence: Not on file   Housing Stability: Not on file      Medications and Allergies:     Current Outpatient Medications   Medication Sig Dispense Refill    ALPRAZolam (XANAX) 0 25 mg tablet Take 1 tablet (0 25 mg total) by mouth daily at bedtime as needed for sleep 30 tablet 0    amLODIPine (NORVASC) 10 mg tablet TAKE 1 TABLET BY MOUTH DAILY 90 tablet 3    famotidine (PEPCID) 20 mg tablet TAKE 1 TABLET BY MOUTH TWICE A  tablet 1    ibuprofen (MOTRIN) 200 mg tablet Take 200 mg by mouth every 6 (six) hours as needed for mild pain      levETIRAcetam (KEPPRA) 250 mg tablet TK 1 T PO BID      levothyroxine 200 mcg tablet TAKE 1 TABLET BY MOUTH EVERY DAY IN THE EARLY MORNING 90 tablet 1    rosuvastatin (CRESTOR) 5 mg tablet TAKE 1 TABLET BY MOUTH EVERY DAY 90 tablet 3    valsartan-hydrochlorothiazide (DIOVAN-HCT) 320-12 5 MG per tablet TAKE 1 TABLET BY MOUTH EVERY DAY 30 tablet 2    Vitamin D, Ergocalciferol, 50 MCG (2000 UT) CAPS Take by mouth       No current facility-administered medications for this visit       No Known Allergies   Immunizations:     Immunization History   Administered Date(s) Administered    COVID-19 MODERNA VACC 0 5 ML IM 01/07/2021, 02/05/2021, 11/02/2021    INFLUENZA 11/25/2011    Influenza, injectable, quadrivalent, preservative free 0 5 mL 09/19/2020    Td (adult), Unspecified 01/01/2007      Health Maintenance:         Topic Date Due    Hepatitis C Screening  Never done    Lung Cancer Screening  Never done    Breast Cancer Screening: Mammogram  01/19/2023    Colorectal Cancer Screening  11/01/2026         Topic Date Due    DTaP,Tdap,and Td Vaccines (1 - Tdap) 01/02/2007    Pneumococcal Vaccine: 65+ Years (1 of 1 - PPSV23) Never done    Influenza Vaccine (1) 09/01/2021      Medicare Health Risk Assessment:     /68   Pulse 87   Temp (!) 97 3 °F (36 3 °C)   Ht 5' (1 524 m)   Wt 92 1 kg (203 lb)   SpO2 92%   BMI 39 65 kg/m²      Nehemias Mitchell is here for her Subsequent Wellness visit  Health Risk Assessment:   Patient rates overall health as good  Patient feels that their physical health rating is slightly better  Patient is satisfied with their life  Eyesight was rated as same  Hearing was rated as same  Patient feels that their emotional and mental health rating is slightly better  Patients states they are never, rarely angry  Patient states they are sometimes unusually tired/fatigued  Pain experienced in the last 7 days has been some  Patient's pain rating has been 3/10  Patient states that she has experienced no weight loss or gain in last 6 months  Depression Screening:   PHQ-2 Score: 0      Fall Risk Screening: In the past year, patient has experienced: history of falling in past year    Injured during fall?: Yes    Feels unsteady when standing or walking?: Yes    Worried about falling?: Yes      Urinary Incontinence Screening:   Patient has not leaked urine accidently in the last six months  Home Safety:  Patient has trouble with stairs inside or outside of their home  Patient has working smoke alarms and has working carbon monoxide detector  Home safety hazards include: none  Nutrition:   Current diet is Low Carb  Medications:   Patient is currently taking over-the-counter supplements  OTC medications include: Multivitamin  Vit D  Vit C  Vit B12  Magnesium  Patient is able to manage medications       Activities of Daily Living (ADLs)/Instrumental Activities of Daily Living (IADLs):   Walk and transfer into and out of bed and chair?: Yes  Dress and groom yourself?: Yes    Bathe or shower yourself?: Yes    Feed yourself? Yes  Do your laundry/housekeeping?: Yes  Manage your money, pay your bills and track your expenses?: Yes  Make your own meals?: Yes    Do your own shopping?: Yes    Previous Hospitalizations:   Any hospitalizations or ED visits within the last 12 months?: No      Advance Care Planning:   Living will: No    Durable POA for healthcare: No    Advanced directive: No      PREVENTIVE SCREENINGS      Cardiovascular Screening:    General: Screening Not Indicated and History Lipid Disorder      Diabetes Screening:     General: Screening Current      Colorectal Cancer Screening:     General: Screening Current      Breast Cancer Screening:     General: Screening Current      Cervical Cancer Screening:    General: Screening Not Indicated      Lung Cancer Screening:     General: Screening Not Indicated    Screening, Brief Intervention, and Referral to Treatment (SBIRT)    Screening  Typical number of drinks in a day: 0  Typical number of drinks in a week: 2  Interpretation: Low risk drinking behavior  AUDIT-C Screenin) How often did you have a drink containing alcohol in the past year? 2 to 3 times a week  2) How many drinks did you have on a typical day when you were drinking in the past year? 0  3) How often did you have 6 or more drinks on one occasion in the past year? never    AUDIT-C Score: 3  Interpretation: Score 3-12 (female): POSITIVE screen for alcohol misuse    AUDIT Screenin) How often during the last year have you found that you were not able to stop drinking once you had started? 0 - never  5) How often during the last year have you failed to do what was normally expected from you because of drinking? 0 - never  6) How often during the last year have you needed a first drink in the morning to get yourself going after a heavy drinking session?  0 - never  7) How often during the last year have you had a feeling of guilt or remorse after drinking? 0 - never  8) How often during the last year have you been unable to remember what happened the night before because you had been drinking? 0 - never  9) Have you or someone else been injured as a result of your drinking? 0 - no  10) Has a relative or friend or a doctor or another health worker been concerned about your drinking or suggested you cut down? 0 - no    AUDIT Score: 3  Interpretation: Low risk alcohol consumption    Single Item Drug Screening:  How often have you used an illegal drug (including marijuana) or a prescription medication for non-medical reasons in the past year? never    Single Item Drug Screen Score: 0  Interpretation: Negative screen for possible drug use disorder      Florencio Du, DO

## 2022-01-24 NOTE — ASSESSMENT & PLAN NOTE
Patient does have a history of elevated blood sugar readings  With labs her sugars normal no evidence of diabetes    We did discuss the importance of diet

## 2022-01-24 NOTE — ASSESSMENT & PLAN NOTE
Patient is here today for repeat Medicare wellness visit  She did have labs performed prior to visit today we did discuss the results of length with patient  Patient states in general her only problem is some occasional pain in her right knee more than her left especially with ambulation going up stairs  No acute accident or injury  She is compliant with her medication and continues to follow-up with her specialists  She did undergo physical examination today in the office  Results as noted  Patient will continue present medication and we will check a vitamin-D level the on repeat thyroid studies when she returns the office in 4 months  In the interim patient was told if any new medical problems concerns to please call

## 2022-01-24 NOTE — PROGRESS NOTES
Answers for HPI/ROS submitted by the patient on 1/18/2022  How often during the last year have you found that you were not able to stop drinking once you had started?: 0 - never  How often during the last year have you failed to do what was normally expected from you because of drinking?: 0 - never  How often during the last year have you needed a first drink in the morning to get yourself going after a heavy drinking session?: 0 - never  How often during the last year have you had a feeling of guilt or remorse after drinking?: 0 - never  How often during the last year have you been unable to remember what happened the night before because you had been drinking?: 0 - never  Have you or someone else been injured as a result of your drinking?: 0 - no  Has a relative or friend or a doctor or another health worker been concerned about your drinking or suggested you cut down?: 0 - no  How would you rate your overall health?: good  Compared to last year, how is your physical health?: slightly better  In general, how satisfied are you with your life?: satisfied  Compared to last year, how is your eyesight?: same  Compared to last year, how is your hearing?: same  Compared to last year, how is your emotional/mental health?: slightly better  How often is anger a problem for you?: never, rarely  How often do you feel unusually tired/fatigued?: sometimes  In the past 7 days, how much pain have you experienced?: some  If you answered "some" or "a lot", please rate the severity of your pain on a scale of 1 to 10 (1 being the least severe pain and 10 being the most intense pain)  : 3/10  In the past 6 months, have you lost or gained 10 pounds without trying?: No  One or more falls in the last year: Yes  In the past 6 months, have you accidentally leaked urine?: No  Do you have trouble with the stairs inside or outside your home?: Yes  Does your home have working smoke alarms?: Yes  Does your home have a carbon monoxide monitor?: Yes  Which safety hazards (if any) have you experienced in your home? Please select all that apply : none  How would you describe your current diet? Please select all that apply : Low Carb  In addition to prescription medications, are you taking any over-the-counter supplements?: Yes  If yes, what supplements are you taking?: Multivitamin  Vit D  Vit C  Vit B12  Magnesium  Can you manage your medications?: Yes  Are you currently taking any opioid medications?: No  Can you walk and transfer into and out of your bed and chair?: Yes  Can you dress and groom yourself?: Yes  Can you bathe or shower yourself?: Yes  Can you feed yourself?: Yes  Can you do your laundry/ housekeeping?: Yes  Can you manage your money, pay your bills, and track your expenses?: Yes  Can you make your own meals?: Yes  Can you do your own shopping?: Yes  Within the last 12 months, have you had any hospitalizations or Emergency Department visits?: No  Do you have a living will?: No  Do you have a Durable POA (Power of ) for healthcare decisions?: No  Do you have an Advanced Directive for end of life decisions?: No  How often have you used an illegal drug (including marijuana) or a prescription medication for non-medical reasons in the past year?: never  What is the typical number of drinks you consume in a day?: 0  What is the typical number of drinks you consume in a week?: 2  How often did you have a drink containing alcohol in the past year?: 2 to 3 times a week  How many drinks did you have on a typical day  when you were drinking in the past year?: 0  How often did you have 6 or more drinks on one occasion in the past year?: never    Assessment/Plan:    Medicare annual wellness visit, subsequent  Patient is here today for repeat Medicare wellness visit  She did have labs performed prior to visit today we did discuss the results of length with patient    Patient states in general her only problem is some occasional pain in her right knee more than her left especially with ambulation going up stairs  No acute accident or injury  She is compliant with her medication and continues to follow-up with her specialists  She did undergo physical examination today in the office  Results as noted  Patient will continue present medication and we will check a vitamin-D level the on repeat thyroid studies when she returns the office in 4 months  In the interim patient was told if any new medical problems concerns to please call  Meningioma, cerebral ([address] )  History of meningioma presented as seizure disorder  Status post resection  States that she has no neurologic sequela  No longer on seizure medication and continues to follow-up with her specialists including Neurosurgery, repeat MRI shows no recurrence of disease    Vitamin D deficiency  Despite taking her supplements her vitamin-D level is low  We did tell the patient to increase her vitamin-D intake a to 2000 internationally units twice a day, check a level with her next visit    Hypothyroidism  TSH is slightly low, T3-T4 normal   She will continue present supplements intact level with her next visit    Hyperglycemia  Patient does have a history of elevated blood sugar readings  With labs her sugars normal no evidence of diabetes  We did discuss the importance of diet    Essential hypertension  Patient has a longstanding history of hypertension  Initially blood pressure was mildly elevated but once the patient was allowed to sit relax it come down to an acceptable range  Patient will continue present medication and we will continue also to monitor her renal function which is normal    Class 2 obesity due to excess calories in adult  With the patient's BMI she is considered obese  She did have some dramatic weight loss after resection of her meningioma but at this point time her weight is stable  She does know the importance of diet and exercise    She is afraid to return to the gym at this time because the coronavirus vaccine but is trying to do some exercise at home  We did suggest also that she look very closely at her dietary intake of calories in order to promote weight loss  Acute pain of right knee  Difficulty having pain in the right knee greater than the left  On evaluation patient has slight valgus deformity tenderness to the tibial plateau medially on the right  Will be going for x-rays of knees bilaterally  Suggest she see Orthopedics       Diagnoses and all orders for this visit:    Acute pain of right knee  -     XR knee 3 vw right non injury; Future  -     XR knee 3 vw left non injury; Future    Vitamin D deficiency  -     Vitamin D 25 hydroxy; Future    Acquired hypothyroidism  -     TSH, 3rd generation with Free T4 reflex; Future    Hyperglycemia    Essential hypertension    Seizure ([facility] )    Meningioma, cerebral ([place] [address] )    Medicare annual wellness visit, subsequent    BMI 39 0-39 9,adult          Subjective:      Patient ID: Luisa Zarate is a 78 y o  female  Patient is a 15-year-old female history of medical problems outlined previously who is here today for repeat Medicare wellness visit  She did have labs performed prior to the visit today we did discuss the results of length with the patient  Patient states in general she is doing relatively well and her only complaint today is some pain bilaterally in the knees more on the right than left some difficulty with ambulation because the of the pain      The following portions of the patient's history were reviewed and updated as appropriate:   She  has a past medical history of Arthritis (2008), Disease of thyroid gland (1976), and Hypertension (2004)    She   Patient Active Problem List    Diagnosis Date Noted    Acute pain of right knee 01/24/2022    Contusion of right chest wall 11/16/2021    Medicare annual wellness visit, subsequent 10/16/2020    Meningioma, cerebral ([facility] ) 11/18/2019    Seizure disorder ([place] [address] ) 11/18/2019    Acute bacterial conjunctivitis of right eye 07/15/2019    Class 2 obesity due to excess calories in adult 01/25/2019    Hyperglycemia 08/23/2016    Hyperlipemia 08/23/2016    Hypothyroidism 05/27/2016    Vitamin D deficiency 05/27/2016    Essential hypertension 05/27/2015     She  has a past surgical history that includes Brain surgery (11/8/2019)  Her family history includes Arthritis in her mother; Diabetes in her mother; Heart disease in her father; Hypertension in her mother  She  reports that she quit smoking about 14 years ago  Her smoking use included cigarettes  She has a 25 00 pack-year smoking history  She has never used smokeless tobacco  She reports that she does not drink alcohol and does not use drugs  Current Outpatient Medications   Medication Sig Dispense Refill    ALPRAZolam (XANAX) 0 25 mg tablet Take 1 tablet (0 25 mg total) by mouth daily at bedtime as needed for sleep 30 tablet 0    amLODIPine (NORVASC) 10 mg tablet TAKE 1 TABLET BY MOUTH DAILY 90 tablet 3    famotidine (PEPCID) 20 mg tablet TAKE 1 TABLET BY MOUTH TWICE A  tablet 1    ibuprofen (MOTRIN) 200 mg tablet Take 200 mg by mouth every 6 (six) hours as needed for mild pain      levETIRAcetam (KEPPRA) 250 mg tablet TK 1 T PO BID      levothyroxine 200 mcg tablet TAKE 1 TABLET BY MOUTH EVERY DAY IN THE EARLY MORNING 90 tablet 1    rosuvastatin (CRESTOR) 5 mg tablet TAKE 1 TABLET BY MOUTH EVERY DAY 90 tablet 3    valsartan-hydrochlorothiazide (DIOVAN-HCT) 320-12 5 MG per tablet TAKE 1 TABLET BY MOUTH EVERY DAY 30 tablet 2    Vitamin D, Ergocalciferol, 50 MCG (2000 UT) CAPS Take by mouth       No current facility-administered medications for this visit       Current Outpatient Medications on File Prior to Visit   Medication Sig    ALPRAZolam (XANAX) 0 25 mg tablet Take 1 tablet (0 25 mg total) by mouth daily at bedtime as needed for sleep    amLODIPine (NORVASC) 10 mg tablet TAKE 1 TABLET BY MOUTH DAILY    famotidine (PEPCID) 20 mg tablet TAKE 1 TABLET BY MOUTH TWICE A DAY    ibuprofen (MOTRIN) 200 mg tablet Take 200 mg by mouth every 6 (six) hours as needed for mild pain    levETIRAcetam (KEPPRA) 250 mg tablet TK 1 T PO BID    levothyroxine 200 mcg tablet TAKE 1 TABLET BY MOUTH EVERY DAY IN THE EARLY MORNING    rosuvastatin (CRESTOR) 5 mg tablet TAKE 1 TABLET BY MOUTH EVERY DAY    valsartan-hydrochlorothiazide (DIOVAN-HCT) 320-12 5 MG per tablet TAKE 1 TABLET BY MOUTH EVERY DAY    Vitamin D, Ergocalciferol, 50 MCG (2000 UT) CAPS Take by mouth    [DISCONTINUED] oxyCODONE-acetaminophen (Percocet) 5-325 mg per tablet Take 1 tablet by mouth every 6 (six) hours as needed for moderate pain Max Daily Amount: 4 tablets     No current facility-administered medications on file prior to visit  She has No Known Allergies       Review of Systems   Constitutional: Positive for activity change (Some restriction activity level secondary to her knee pain)  Negative for appetite change, chills, diaphoresis, fatigue, fever and unexpected weight change  HENT: Negative  Eyes: Negative  Respiratory: Negative  Cardiovascular: Negative  Endocrine: Negative  Genitourinary: Negative  Musculoskeletal: Positive for arthralgias  Negative for back pain, gait problem, joint swelling, myalgias, neck pain and neck stiffness  Skin: Negative  Allergic/Immunologic: Negative  Neurological: Negative  Hematological: Negative  Psychiatric/Behavioral: Negative  Objective:      /72   Pulse 87   Temp (!) 97 3 °F (36 3 °C)   Ht 5' (1 524 m)   Wt 92 1 kg (203 lb)   SpO2 92%   BMI 39 65 kg/m²          Physical Exam  Vitals and nursing note reviewed  Constitutional:       General: She is not in acute distress  Appearance: Normal appearance  She is obese  She is not ill-appearing, toxic-appearing or diaphoretic        Comments: Pleasant, articulate, obese 27-year-old female who is awake alert no acute distress and oriented x3   HENT:      Head: Normocephalic and atraumatic  Right Ear: Tympanic membrane, ear canal and external ear normal  There is no impacted cerumen  Left Ear: Tympanic membrane, ear canal and external ear normal  There is no impacted cerumen  Nose: Nose normal  No congestion or rhinorrhea  Mouth/Throat:      Mouth: Mucous membranes are moist       Pharynx: Oropharynx is clear  No oropharyngeal exudate or posterior oropharyngeal erythema  Eyes:      General: No scleral icterus  Right eye: No discharge  Left eye: No discharge  Extraocular Movements: Extraocular movements intact  Conjunctiva/sclera: Conjunctivae normal       Pupils: Pupils are equal, round, and reactive to light  Neck:      Vascular: No carotid bruit  Cardiovascular:      Rate and Rhythm: Normal rate and regular rhythm  Pulses: Normal pulses  Heart sounds: Normal heart sounds  No murmur heard  No friction rub  No gallop  Pulmonary:      Effort: Pulmonary effort is normal  No respiratory distress  Breath sounds: Normal breath sounds  No stridor  No wheezing, rhonchi or rales  Chest:      Chest wall: No tenderness  Abdominal:      General: Bowel sounds are normal  There is no distension  Palpations: Abdomen is soft  There is no mass  Tenderness: There is no abdominal tenderness  There is no right CVA tenderness, left CVA tenderness, guarding or rebound  Hernia: No hernia is present  Comments: Obese   Musculoskeletal:         General: Tenderness (Tenderness tibial plateau medially on the right knee) and deformity ( valgus deformity bilaterally to the knees worse on the right than the left) present  No swelling or signs of injury  Normal range of motion  Cervical back: Normal range of motion and neck supple  No rigidity or tenderness  Right lower leg: No edema  Left lower leg: No edema     Lymphadenopathy: Cervical: No cervical adenopathy  Skin:     General: Skin is warm and dry  Capillary Refill: Capillary refill takes less than 2 seconds  Coloration: Skin is not jaundiced or pale  Findings: No bruising, erythema, lesion or rash  Neurological:      General: No focal deficit present  Mental Status: She is alert and oriented to person, place, and time  Mental status is at baseline  Cranial Nerves: No cranial nerve deficit  Sensory: No sensory deficit  Motor: No weakness  Coordination: Coordination normal       Gait: Gait normal       Deep Tendon Reflexes: Reflexes normal    Psychiatric:         Mood and Affect: Mood normal          Behavior: Behavior normal          Thought Content: Thought content normal          Judgment: Judgment normal          BMI Counseling: Body mass index is 39 65 kg/m²  The BMI is above normal  Nutrition recommendations include reducing portion sizes, decreasing overall calorie intake, 3-5 servings of fruits/vegetables daily, consuming healthier snacks, moderation in carbohydrate intake, increasing intake of lean protein, reducing intake of saturated fat and trans fat and reducing intake of cholesterol  Exercise recommendations include exercising 3-5 times per week

## 2022-01-24 NOTE — ASSESSMENT & PLAN NOTE
Patient has a longstanding history of hypertension  Initially blood pressure was mildly elevated but once the patient was allowed to sit relax it come down to an acceptable range    Patient will continue present medication and we will continue also to monitor her renal function which is normal

## 2022-02-10 ENCOUNTER — TELEPHONE (OUTPATIENT)
Dept: INTERNAL MEDICINE CLINIC | Facility: CLINIC | Age: 67
End: 2022-02-10

## 2022-02-10 NOTE — TELEPHONE ENCOUNTER
Pt had a knee xray  She saw the results in [facility] but she didn't hear from you about it  She would like to talk to you    But she does have an ortho appoint on 2/14

## 2022-03-04 DIAGNOSIS — I10 ESSENTIAL HYPERTENSION: ICD-10-CM

## 2022-03-04 RX ORDER — VALSARTAN AND HYDROCHLOROTHIAZIDE 320; 12.5 MG/1; MG/1
TABLET, FILM COATED ORAL
Qty: 90 TABLET | Refills: 1 | Status: SHIPPED | OUTPATIENT
Start: 2022-03-04 | End: 2022-03-05 | Stop reason: SDUPTHER

## 2022-03-05 RX ORDER — VALSARTAN AND HYDROCHLOROTHIAZIDE 320; 12.5 MG/1; MG/1
1 TABLET, FILM COATED ORAL DAILY
Qty: 90 TABLET | Refills: 1 | Status: SHIPPED | OUTPATIENT
Start: 2022-03-05 | End: 2022-08-08

## 2022-03-25 ENCOUNTER — TELEPHONE (OUTPATIENT)
Dept: INTERNAL MEDICINE CLINIC | Facility: CLINIC | Age: 67
End: 2022-03-25

## 2022-03-25 ENCOUNTER — TELEPHONE (OUTPATIENT)
Dept: OTHER | Facility: OTHER | Age: 67
End: 2022-03-25

## 2022-03-25 DIAGNOSIS — F51.02 ADJUSTMENT INSOMNIA: ICD-10-CM

## 2022-03-25 DIAGNOSIS — F41.9 ANXIETY: Primary | ICD-10-CM

## 2022-03-25 RX ORDER — ALPRAZOLAM 0.25 MG/1
0.25 TABLET ORAL 2 TIMES DAILY PRN
Qty: 30 TABLET | Refills: 3 | Status: SHIPPED | OUTPATIENT
Start: 2022-03-25

## 2022-03-25 NOTE — TELEPHONE ENCOUNTER
Patient called and she is having anxiety attacks again and asked if you could please call her back at [phone]   Thank you

## 2022-03-25 NOTE — TELEPHONE ENCOUNTER
Patient would like to speak to Dr Lety Rey about an issue she is having with anxiety  Please give her a call if he isn't in the office today

## 2022-03-25 NOTE — ASSESSMENT & PLAN NOTE
Patient states that she is starting to have again problems with increasing anxiety  Relates that much of this is secondary to changes at work  Did have a panic attack going to work today  Previously been on Xanax as needed and new prescription was sent to the pharmacy    Patient will call if continued problems would consider SSRI

## 2022-05-10 ENCOUNTER — RA CDI HCC (OUTPATIENT)
Dept: OTHER | Facility: HOSPITAL | Age: 67
End: 2022-05-10

## 2022-05-10 NOTE — PROGRESS NOTES
[facility]  coding opportunities        [id number]  Chart Reviewed number of suggestions sent to Provider: 1     Patients Insurance     Medicare Insurance: [employer]

## 2022-05-16 ENCOUNTER — OFFICE VISIT (OUTPATIENT)
Dept: INTERNAL MEDICINE CLINIC | Facility: CLINIC | Age: 67
End: 2022-05-16
Payer: MEDICARE

## 2022-05-16 VITALS
DIASTOLIC BLOOD PRESSURE: 78 MMHG | OXYGEN SATURATION: 97 % | HEART RATE: 87 BPM | HEIGHT: 60 IN | WEIGHT: 207 LBS | BODY MASS INDEX: 40.64 KG/M2 | SYSTOLIC BLOOD PRESSURE: 138 MMHG | TEMPERATURE: 97.7 F

## 2022-05-16 DIAGNOSIS — E03.9 ACQUIRED HYPOTHYROIDISM: ICD-10-CM

## 2022-05-16 DIAGNOSIS — M25.561 ACUTE PAIN OF RIGHT KNEE: ICD-10-CM

## 2022-05-16 DIAGNOSIS — I10 ESSENTIAL HYPERTENSION: Primary | ICD-10-CM

## 2022-05-16 DIAGNOSIS — D32.0 MENINGIOMA, CEREBRAL (HCC): ICD-10-CM

## 2022-05-16 DIAGNOSIS — E55.9 VITAMIN D DEFICIENCY: ICD-10-CM

## 2022-05-16 DIAGNOSIS — R73.9 HYPERGLYCEMIA: ICD-10-CM

## 2022-05-16 PROCEDURE — 99214 OFFICE O/P EST MOD 30 MIN: CPT | Performed by: INTERNAL MEDICINE

## 2022-05-16 NOTE — ASSESSMENT & PLAN NOTE
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

## 2022-05-16 NOTE — ASSESSMENT & PLAN NOTE
Patient has a history of resection large meningioma previously  Complicated with seizure disorder  Patient continues to follow-up with Neurology  No recent seizure activity but patient remains on Keppra 250 mg twice a day

## 2022-05-16 NOTE — ASSESSMENT & PLAN NOTE
History of hyperglycemia  Check a fasting blood sugar with her next visit  Patient admits she is not always compliant with her diet and as noted is not had any substantial weight loss since her last visit

## 2022-05-16 NOTE — PROGRESS NOTES
----- Message from Denise Grande PA-C sent at 7/14/2021  2:34 PM CDT -----  Discussed with Dr. Clinton Khan's disease in pregnancy  History of noncompliance with medication  TFT have improved  Discontinue PTU - make sure we talk to patient  call patient's pharmacy to discontinue PTU  Repeat TFTs in 2 weeks  Keep upcoming appt in 08/2021   Assessment/Plan:    Hypothyroidism  Because of a low TSH level with her last visit she was given specific instructions to take her thyroid medication only 6 times per week  The she states that she did not follow orders and was very condition to take this medication on a daily basis  Her TSH is still low and again as previously was suggested the taking her thyroid hormone only 6 times per week and never on a Sunday  Check thyroid function with her next visit  Hyperglycemia  History of hyperglycemia  Check a fasting blood sugar with her next visit  Patient admits she is not always compliant with her diet and as noted is not had any substantial weight loss since her last visit  Essential hypertension  With evaluation patient's blood pressure was elevated with initial presentation to the office  Once the patient was allowed to sit relax it come down to an acceptable range which is consistent with previous readings  Patient will continue present medication and surveillance  We will continue to monitor also her renal function  Meningioma, cerebral [facility])  Patient has a history of resection large meningioma previously  Complicated with seizure disorder  Patient continues to follow-up with Neurology  No recent seizure activity but patient remains on Keppra 250 mg twice a day  Vitamin D deficiency  Patient is taking her vitamin-D supplements as previously indicated  Has had an improvement with her vitamin-D level  Will continue with present medication  Acute pain of right knee  History of DJD to the right knee  Is continuing to follow-up with orthopedics and is receiving injections to that knee which has helped and is only temporizing  Knows that in the future she most likely will need a total knee replacement  To continue to follow-up with orthopedics       Diagnoses and all orders for this visit:    Essential hypertension  -     Basic metabolic panel;  Future    Vitamin D deficiency    Hyperglycemia    Acquired hypothyroidism  -     TSH, 3rd generation with Free T4 reflex; Future    Meningioma, cerebral (HCC)    Acute pain of right knee    Other orders  -     Diclofenac Sodium (VOLTAREN) 1 %; APPLY 2 GRAMS TO THE AFFECTED AREA(S) BY TOPICAL ROUTE 4 TIMES PER DAY          Subjective:      Patient ID: Gunner Dent is a 78 y o  female  Patient is a 69-year-old female history of medical problems as outlined previously here today for routine follow-up after four-month period of time  She did have labs performed prior to the visit today we did discuss the results length patient  Patient states that she has been having continued problems with arthritis in her knees which definitely has inhibited her ambulation  She continues to follow-up orthopedics  Patient did have therapeutic injection to her right knee which she states has been helpful knows in the future most leave he will need a knee replacement  Continues to follow-up with Neurology      The following portions of the patient's history were reviewed and updated as appropriate:   She  has a past medical history of Arthritis (2008), Disease of thyroid gland (1976), and Hypertension (2004)  She   Patient Active Problem List    Diagnosis Date Noted    Anxiety 03/25/2022    Acute pain of right knee 01/24/2022    Contusion of right chest wall 11/16/2021    Medicare annual wellness visit, subsequent 10/16/2020    Meningioma, cerebral ([facility] [address] ) 11/18/2019    Seizure disorder ([facility] [address] ) 11/18/2019    Acute bacterial conjunctivitis of right eye 07/15/2019    Class 2 obesity due to excess calories in adult 01/25/2019    Hyperglycemia 08/23/2016    Hyperlipemia 08/23/2016    Hypothyroidism 05/27/2016    Vitamin D deficiency 05/27/2016    Essential hypertension 05/27/2015     She  has a past surgical history that includes Brain surgery (11/8/2019)    Her family history includes Arthritis in her mother; Diabetes in her mother; Heart disease in her father; Hypertension in her mother  She  reports that she quit smoking about 14 years ago  Her smoking use included cigarettes  She has a 25 00 pack-year smoking history  She has never used smokeless tobacco  She reports that she does not drink alcohol and does not use drugs  Current Outpatient Medications   Medication Sig Dispense Refill    ALPRAZolam (XANAX) 0 25 mg tablet Take 1 tablet (0 25 mg total) by mouth 2 (two) times a day as needed for anxiety or sleep 30 tablet 3    amLODIPine (NORVASC) 10 mg tablet TAKE 1 TABLET BY MOUTH DAILY 90 tablet 3    Diclofenac Sodium (VOLTAREN) 1 % APPLY 2 GRAMS TO THE AFFECTED AREA(S) BY TOPICAL ROUTE 4 TIMES PER DAY      famotidine (PEPCID) 20 mg tablet TAKE 1 TABLET BY MOUTH TWICE A  tablet 1    ibuprofen (MOTRIN) 200 mg tablet Take 200 mg by mouth every 6 (six) hours as needed for mild pain      levETIRAcetam (KEPPRA) 250 mg tablet TK 1 T PO BID      levothyroxine 200 mcg tablet TAKE 1 TABLET BY MOUTH EVERY DAY IN THE EARLY MORNING 90 tablet 1    rosuvastatin (CRESTOR) 5 mg tablet TAKE 1 TABLET BY MOUTH EVERY DAY 90 tablet 3    valsartan-hydrochlorothiazide (DIOVAN-HCT) 320-12 5 MG per tablet Take 1 tablet by mouth daily 90 tablet 1    Vitamin D, Ergocalciferol, 50 MCG (2000 UT) CAPS Take by mouth       No current facility-administered medications for this visit       Current Outpatient Medications on File Prior to Visit   Medication Sig    ALPRAZolam (XANAX) 0 25 mg tablet Take 1 tablet (0 25 mg total) by mouth 2 (two) times a day as needed for anxiety or sleep    amLODIPine (NORVASC) 10 mg tablet TAKE 1 TABLET BY MOUTH DAILY    Diclofenac Sodium (VOLTAREN) 1 % APPLY 2 GRAMS TO THE AFFECTED AREA(S) BY TOPICAL ROUTE 4 TIMES PER DAY    famotidine (PEPCID) 20 mg tablet TAKE 1 TABLET BY MOUTH TWICE A DAY    ibuprofen (MOTRIN) 200 mg tablet Take 200 mg by mouth every 6 (six) hours as needed for mild pain    levETIRAcetam (KEPPRA) 250 mg tablet TK 1 T PO BID    levothyroxine 200 mcg tablet TAKE 1 TABLET BY MOUTH EVERY DAY IN THE EARLY MORNING    rosuvastatin (CRESTOR) 5 mg tablet TAKE 1 TABLET BY MOUTH EVERY DAY    valsartan-hydrochlorothiazide (DIOVAN-HCT) 320-12 5 MG per tablet Take 1 tablet by mouth daily    Vitamin D, Ergocalciferol, 50 MCG (2000 UT) CAPS Take by mouth     No current facility-administered medications on file prior to visit  She is allergic to medical tape       Review of Systems   Constitutional: Positive for activity change (Limited with activity him an exercise secondary to her knee pain)  Negative for appetite change, chills, diaphoresis, fatigue, fever and unexpected weight change  HENT: Negative  Eyes: Negative  Respiratory: Negative  Gastrointestinal: Negative  Endocrine: Negative  Genitourinary: Negative  Musculoskeletal: Positive for arthralgias  Negative for back pain, gait problem, joint swelling, myalgias, neck pain and neck stiffness  Skin: Negative  Allergic/Immunologic: Negative  Neurological: Negative  Hematological: Negative  Psychiatric/Behavioral: Negative  Objective:      /78   Pulse 87   Temp 97 7 °F (36 5 °C)   Ht 5' (1 524 m)   Wt 93 9 kg (207 lb)   SpO2 97%   BMI 40 43 kg/m²          Physical Exam  Vitals and nursing note reviewed  Constitutional:       General: She is not in acute distress  Appearance: Normal appearance  She is obese  She is not ill-appearing, toxic-appearing or diaphoretic  Comments: Pleasant, articulate, obese 60-year-old female who is awake alert in no acute distress and oriented x3  Antalgic gait favoring her right knee difficulty with transfer   HENT:      Head: Normocephalic and atraumatic  Right Ear: Tympanic membrane, ear canal and external ear normal  There is no impacted cerumen  Left Ear: Tympanic membrane, ear canal and external ear normal  There is no impacted cerumen        Nose: Nose normal  No congestion or rhinorrhea  Mouth/Throat:      Mouth: Mucous membranes are moist       Pharynx: Oropharynx is clear  No oropharyngeal exudate or posterior oropharyngeal erythema  Eyes:      General: No scleral icterus  Right eye: No discharge  Left eye: No discharge  Extraocular Movements: Extraocular movements intact  Conjunctiva/sclera: Conjunctivae normal       Pupils: Pupils are equal, round, and reactive to light  Neck:      Vascular: No carotid bruit  Cardiovascular:      Rate and Rhythm: Normal rate and regular rhythm  Pulses: Normal pulses  Heart sounds: Normal heart sounds  No murmur heard  No friction rub  No gallop  Pulmonary:      Effort: Pulmonary effort is normal  No respiratory distress  Breath sounds: Normal breath sounds  No stridor  No wheezing, rhonchi or rales  Chest:      Chest wall: No tenderness  Abdominal:      General: Bowel sounds are normal  There is no distension  Palpations: Abdomen is soft  There is no mass  Tenderness: There is no abdominal tenderness  There is no right CVA tenderness, left CVA tenderness, guarding or rebound  Hernia: No hernia is present  Musculoskeletal:         General: Deformity present  No swelling, tenderness or signs of injury  Cervical back: Normal range of motion and neck supple  No rigidity or tenderness  Right lower leg: No edema  Left lower leg: No edema  Lymphadenopathy:      Cervical: No cervical adenopathy  Skin:     General: Skin is warm and dry  Capillary Refill: Capillary refill takes less than 2 seconds  Coloration: Skin is not jaundiced or pale  Findings: No bruising, erythema, lesion or rash  Neurological:      General: No focal deficit present  Mental Status: She is alert and oriented to person, place, and time  Mental status is at baseline  Cranial Nerves: No cranial nerve deficit  Sensory: No sensory deficit  Motor: No weakness  Coordination: Coordination normal       Gait: Gait normal       Deep Tendon Reflexes: Reflexes normal    Psychiatric:         Mood and Affect: Mood normal          Behavior: Behavior normal          Thought Content:  Thought content normal          Judgment: Judgment normal

## 2022-05-16 NOTE — ASSESSMENT & PLAN NOTE
With evaluation patient's blood pressure was elevated with initial presentation to the office  Once the patient was allowed to sit relax it come down to an acceptable range which is consistent with previous readings  Patient will continue present medication and surveillance  We will continue to monitor also her renal function

## 2022-05-16 NOTE — ASSESSMENT & PLAN NOTE
Because of a low TSH level with her last visit she was given specific instructions to take her thyroid medication only 6 times per week  The she states that she did not follow orders and was very condition to take this medication on a daily basis  Her TSH is still low and again as previously was suggested the taking her thyroid hormone only 6 times per week and never on a Sunday  Check thyroid function with her next visit

## 2022-05-16 NOTE — ASSESSMENT & PLAN NOTE
Patient is taking her vitamin-D supplements as previously indicated  Has had an improvement with her vitamin-D level  Will continue with present medication

## 2022-05-20 DIAGNOSIS — K29.40 ATROPHIC GASTRITIS WITHOUT HEMORRHAGE: ICD-10-CM

## 2022-05-20 RX ORDER — FAMOTIDINE 20 MG/1
TABLET, FILM COATED ORAL
Qty: 180 TABLET | Refills: 1 | Status: SHIPPED | OUTPATIENT
Start: 2022-05-20

## 2022-06-14 DIAGNOSIS — E03.9 ACQUIRED HYPOTHYROIDISM: ICD-10-CM

## 2022-06-14 RX ORDER — LEVOTHYROXINE SODIUM 0.2 MG/1
TABLET ORAL
Qty: 90 TABLET | Refills: 1 | Status: SHIPPED | OUTPATIENT
Start: 2022-06-14

## 2022-08-08 DIAGNOSIS — I10 ESSENTIAL HYPERTENSION: ICD-10-CM

## 2022-08-08 RX ORDER — VALSARTAN AND HYDROCHLOROTHIAZIDE 320; 12.5 MG/1; MG/1
TABLET, FILM COATED ORAL
Qty: 30 TABLET | Refills: 5 | Status: SHIPPED | OUTPATIENT
Start: 2022-08-08

## 2022-08-08 RX ORDER — AMLODIPINE BESYLATE 10 MG/1
TABLET ORAL
Qty: 90 TABLET | Refills: 3 | Status: SHIPPED | OUTPATIENT
Start: 2022-08-08

## 2022-09-12 ENCOUNTER — RA CDI HCC (OUTPATIENT)
Dept: OTHER | Facility: HOSPITAL | Age: 67
End: 2022-09-12

## 2022-09-12 NOTE — PROGRESS NOTES
[facility]  coding opportunities     E66 [id number]     Chart Reviewed number of suggestions sent to Provider: 1     Patients Insurance     Medicare Insurance: [employer]

## 2022-09-16 ENCOUNTER — OFFICE VISIT (OUTPATIENT)
Dept: INTERNAL MEDICINE CLINIC | Facility: CLINIC | Age: 67
End: 2022-09-16
Payer: MEDICARE

## 2022-09-16 VITALS
SYSTOLIC BLOOD PRESSURE: 146 MMHG | TEMPERATURE: 97.8 F | HEART RATE: 90 BPM | DIASTOLIC BLOOD PRESSURE: 80 MMHG | OXYGEN SATURATION: 93 % | HEIGHT: 60 IN | RESPIRATION RATE: 16 BRPM | BODY MASS INDEX: 40.95 KG/M2 | WEIGHT: 208.6 LBS

## 2022-09-16 DIAGNOSIS — Z01.818 PRE-OP EVALUATION: Primary | ICD-10-CM

## 2022-09-16 DIAGNOSIS — E03.9 ACQUIRED HYPOTHYROIDISM: ICD-10-CM

## 2022-09-16 DIAGNOSIS — Z00.00 MEDICARE ANNUAL WELLNESS VISIT, SUBSEQUENT: ICD-10-CM

## 2022-09-16 DIAGNOSIS — I10 ESSENTIAL HYPERTENSION: ICD-10-CM

## 2022-09-16 DIAGNOSIS — E78.00 PURE HYPERCHOLESTEROLEMIA: ICD-10-CM

## 2022-09-16 DIAGNOSIS — M17.10 KNEE ARTHROPATHY: ICD-10-CM

## 2022-09-16 DIAGNOSIS — R73.9 HYPERGLYCEMIA: ICD-10-CM

## 2022-09-16 PROCEDURE — 99214 OFFICE O/P EST MOD 30 MIN: CPT | Performed by: INTERNAL MEDICINE

## 2022-09-16 RX ORDER — ASPIRIN 81 MG/1
81 TABLET ORAL
COMMUNITY

## 2022-09-16 RX ORDER — MELOXICAM 15 MG/1
15 TABLET ORAL DAILY
COMMUNITY
Start: 2022-07-02

## 2022-09-16 RX ORDER — CHLORHEXIDINE GLUCONATE 213 G/1000ML
SOLUTION TOPICAL
COMMUNITY
Start: 2022-08-29

## 2022-09-16 NOTE — ASSESSMENT & PLAN NOTE
The patient does have a history degenerative arthritis to her right knee  States that she is having extreme difficulty with pain and difficulty with ambulation  The patient was seen, did have evaluation by Orthopedics and has had multiple injections with knee which only helped temporarily has felt patient needs a total knee replacement which has been scheduled for next Thursday  Patient is here today for preop evaluation  EKG was performed today in the office which shows no acute changes and she has been cleared from a medical standpoint looking at her pre-admission testing  Patient will continue with present medication and take medication the morning of surgery with small glass of water especially her seizure medication  Is hoping to go to rehab after her procedure which would be indicated especially with her living circumstances    Patient understandably has some anxiety about the upcoming procedure and does has Xanax to take as needed

## 2022-09-16 NOTE — ASSESSMENT & PLAN NOTE
Longstanding history of hypertension  She is showing adequate control with present treatment, renal function is stable  He will continue present medication and surveillance

## 2022-09-16 NOTE — ASSESSMENT & PLAN NOTE
TSH is still extremely well  Is a levothyroxine 6 times per week she was told found by to have a low TSH  We have modify treatment and patient he taking levothyroxine  5 times per week check thyroid function with her next visit  Edgar Jenkins

## 2022-09-16 NOTE — ASSESSMENT & PLAN NOTE
Cholesterol is showing adequate control with diet alone  Check this again with her next visit for her repeat Medicare wellness visit

## 2022-09-16 NOTE — PROGRESS NOTES
Name: Estrada Preciado      : 1955      MRN: [id number]  Encounter Provider: Adebayo Dwyer DO  Encounter Date: 2022   Encounter department: [facility]    Assessment & Plan     1  Pre-op evaluation  -     POCT ECG    2  Essential hypertension  Assessment & Plan:  Longstanding history of hypertension  She is showing adequate control with present treatment, renal function is stable  He will continue present medication and surveillance  Orders:  -     Comprehensive metabolic panel; Future  -     CBC and differential; Future  -     Lipid panel; Future  -     UA (URINE) with reflex to Scope; Future; Expected date: 2022    3  Medicare annual wellness visit, subsequent  Assessment & Plan:  Patient will be due for repeat Medicare wellness visit when she returns the office in 4 months  She was given a slip for complete labs to be performed prior to the visit  Patient was told if any new issues or problems to please call  We may be seeing her postoperatively    Orders:  -     Comprehensive metabolic panel; Future  -     CBC and differential; Future  -     Lipid panel; Future  -     UA (URINE) with reflex to Scope; Future; Expected date: 2022  -     TSH, 3rd generation with Free T4 reflex; Future  -     Hemoglobin A1C; Future    4  Acquired hypothyroidism  Assessment & Plan:  TSH is still extremely well  Is a levothyroxine 6 times per week she was told found by to have a low TSH  We have modify treatment and patient he taking levothyroxine  5 times per week check thyroid function with her next visit       Orders:  -     TSH, 3rd generation with Free T4 reflex; Future    5  Hyperglycemia    6  Pure hypercholesterolemia  Assessment & Plan:  Cholesterol is showing adequate control with diet alone  Check this again with her next visit for her repeat Medicare wellness visit  Orders:  -     Lipid panel; Future    7   Knee arthropathy  Assessment & Plan:  The patient does have a history degenerative arthritis to her right knee  States that she is having extreme difficulty with pain and difficulty with ambulation  The patient was seen, did have evaluation by Orthopedics and has had multiple injections with knee which only helped temporarily has felt patient needs a total knee replacement which has been scheduled for next Thursday  Patient is here today for preop evaluation  EKG was performed today in the office which shows no acute changes and she has been cleared from a medical standpoint looking at her pre-admission testing  Patient will continue with present medication and take medication the morning of surgery with small glass of water especially her seizure medication  Is hoping to go to rehab after her procedure which would be indicated especially with her living circumstances  Patient understandably has some anxiety about the upcoming procedure and does has Xanax to take as needed             Subjective     Patient is a 81-year-old female history of multiple medical problems as outlined previously who is here today for routine follow-up, preop evaluation prior to undergoing a total needs replacement on the right  She states he is still having problems with pain especially with ambulation in her right knee  She has undergone conservative treatment which is been unsuccessful  As part of preop evaluation she did have an EKG performed in the office showing no acute changes or evidence of ischemia  We also did review put preop testing    Review of Systems   Constitutional: Positive for activity change (States secondary to knee pain has had some decrease in activity level)  Negative for appetite change, chills, diaphoresis, fatigue, fever and unexpected weight change  HENT: Negative  Eyes: Negative  Respiratory: Negative  Cardiovascular: Negative  Gastrointestinal: Negative  Endocrine: Negative  Genitourinary: Negative      Musculoskeletal: Positive for arthralgias and gait problem  Negative for back pain, joint swelling, myalgias, neck pain and neck stiffness  Skin: Negative  Allergic/Immunologic: Negative  Hematological: Negative  Psychiatric/Behavioral: Negative  Past Medical History:   Diagnosis Date    Arthritis 2008    Disease of thyroid gland 1976    Hypertension 2004     Past Surgical History:   Procedure Laterality Date    BRAIN SURGERY  2019    Benign lt  Parietal Meningioma     Family History   Problem Relation Age of Onset    Heart disease Father     Hypertension Mother     Diabetes Mother         Latent    Arthritis Mother      Social History     Socioeconomic History    Marital status: Single     Spouse name: None    Number of children: None    Years of education: None    Highest education level: None   Occupational History    None   Tobacco Use    Smoking status: Former Smoker     Packs/day: 1 00     Years: 25 00     Pack years: 25 00     Types: Cigarettes     Quit date: 2007     Years since quittin 8    Smokeless tobacco: Never Used    Tobacco comment: Quit off & on up until    Haven't smoked since   Substance and Sexual Activity    Alcohol use: No    Drug use: Never    Sexual activity: Not Currently     Partners: Male     Birth control/protection: None   Other Topics Concern    None   Social History Narrative    None     Social Determinants of Health     Financial Resource Strain: Not on file   Food Insecurity: Not on file   Transportation Needs: Not on file   Physical Activity: Not on file   Stress: Not on file   Social Connections: Not on file   Intimate Partner Violence: Not on file   Housing Stability: Not on file     Current Outpatient Medications on File Prior to Visit   Medication Sig    ALPRAZolam (XANAX) 0 25 mg tablet Take 1 tablet (0 25 mg total) by mouth 2 (two) times a day as needed for anxiety or sleep    amLODIPine (NORVASC) 10 mg tablet TAKE 1 TABLET BY MOUTH DAILY    aspirin (ECOTRIN LOW STRENGTH) 81 mg EC tablet Take 81 mg by mouth    Diclofenac Sodium (VOLTAREN) 1 % APPLY 2 GRAMS TO THE AFFECTED AREA(S) BY TOPICAL ROUTE 4 TIMES PER DAY    famotidine (PEPCID) 20 mg tablet TAKE 1 TABLET BY MOUTH TWICE A DAY    Hibiclens 4 % external liquid APPLY BY TOPICAL ROUTE TO THE OPERATIVE LEG THE NIGHT PRIOR TO SURGERY   ibuprofen (MOTRIN) 200 mg tablet Take 200 mg by mouth every 6 (six) hours as needed for mild pain    levETIRAcetam (KEPPRA) 250 mg tablet TK 1 T PO BID    levothyroxine 200 mcg tablet TAKE 1 TABLET BY MOUTH EVERY DAY IN THE EARLY MORNING (Patient taking differently: Take one tablet 6 days a week )    meloxicam (MOBIC) 15 mg tablet Take 15 mg by mouth daily    Multiple Vitamin (MULTIVITAMIN ADULT PO) Take 1 tablet by mouth every other day    mupirocin (BACTROBAN) 2 % ointment APPLY A SMALL AMOUNT TO THE BILATERAL NARES ONCE A DAY FOR FIVE DAYS PRIOR TO SURGERY    rosuvastatin (CRESTOR) 5 mg tablet Take 1 tablet (5 mg total) by mouth daily    valsartan-hydrochlorothiazide (DIOVAN-HCT) 320-12 5 MG per tablet TAKE 1 TABLET BY MOUTH EVERY DAY    Vitamin D, Ergocalciferol, 50 MCG (2000 UT) CAPS Take by mouth     Allergies   Allergen Reactions    Medical Tape Irritability     Paper tape was used  Immunization History   Administered Date(s) Administered    COVID-19 MODERNA VACC 0 5 ML IM 01/07/2021, 02/05/2021, 11/02/2021    INFLUENZA 11/25/2011    Influenza, injectable, quadrivalent, preservative free 0 5 mL 09/19/2020    Td (adult), Unspecified 01/01/2007       Objective     /80   Pulse 90   Temp 97 8 °F (36 6 °C)   Resp 16   Ht 5' (1 524 m)   Wt 94 6 kg (208 lb 9 6 oz)   SpO2 93%   BMI 40 74 kg/m²     Physical Exam  Vitals and nursing note reviewed  Constitutional:       General: She is not in acute distress  Appearance: Normal appearance  She is obese  She is not ill-appearing, toxic-appearing or diaphoretic        Comments: Pleasant, articulate, obese 71-year-old female who is awake alert no acute distress oriented x3  Antalgic gait favoring her right knee   HENT:      Head: Normocephalic and atraumatic  Right Ear: Tympanic membrane, ear canal and external ear normal  There is no impacted cerumen  Left Ear: Tympanic membrane, ear canal and external ear normal  There is no impacted cerumen  Nose: Nose normal  No congestion or rhinorrhea  Mouth/Throat:      Mouth: Mucous membranes are moist       Pharynx: Oropharynx is clear  No oropharyngeal exudate or posterior oropharyngeal erythema  Eyes:      General: No scleral icterus  Right eye: No discharge  Left eye: No discharge  Extraocular Movements: Extraocular movements intact  Conjunctiva/sclera: Conjunctivae normal       Pupils: Pupils are equal, round, and reactive to light  Neck:      Vascular: No carotid bruit  Cardiovascular:      Rate and Rhythm: Normal rate and regular rhythm  Pulses: Normal pulses  Heart sounds: Normal heart sounds  No murmur heard  No friction rub  No gallop  Pulmonary:      Effort: Pulmonary effort is normal  No respiratory distress  Breath sounds: Normal breath sounds  No stridor  No wheezing, rhonchi or rales  Chest:      Chest wall: No tenderness  Abdominal:      General: Bowel sounds are normal  There is no distension  Palpations: Abdomen is soft  There is no mass  Tenderness: There is no abdominal tenderness  There is no right CVA tenderness, left CVA tenderness, guarding or rebound  Hernia: No hernia is present  Musculoskeletal:         General: Deformity (Some minor deformity to her right knee  Patient with range of motion has crepitus heard on examination  Some slight swelling, tenderness medially to patella inferiorly) present  No swelling, tenderness or signs of injury  Cervical back: Normal range of motion and neck supple  No rigidity or tenderness        Right lower leg: No edema  Left lower leg: No edema  Lymphadenopathy:      Cervical: No cervical adenopathy  Skin:     General: Skin is warm and dry  Coloration: Skin is not jaundiced or pale  Findings: No bruising, erythema or lesion  Neurological:      Mental Status: She is alert and oriented to person, place, and time  Mental status is at baseline  Psychiatric:         Mood and Affect: Mood normal          Behavior: Behavior normal          Thought Content:  Thought content normal          Judgment: Judgment normal        Clif Bowen DO

## 2022-09-16 NOTE — ASSESSMENT & PLAN NOTE
Patient will be due for repeat Medicare wellness visit when she returns the office in 4 months  She was given a slip for complete labs to be performed prior to the visit  Patient was told if any new issues or problems to please call    We may be seeing her postoperatively

## 2022-09-19 PROCEDURE — 93000 ELECTROCARDIOGRAM COMPLETE: CPT | Performed by: INTERNAL MEDICINE

## 2022-09-26 ENCOUNTER — TELEPHONE (OUTPATIENT)
Dept: INTERNAL MEDICINE CLINIC | Facility: CLINIC | Age: 67
End: 2022-09-26

## 2022-09-26 NOTE — TELEPHONE ENCOUNTER
Patient is asking for BMP and she is home bound due to knee surgery and [facility] is asking for it to be done on Wednesday  Please call patient back at [phone]    Thank you

## 2022-10-04 ENCOUNTER — TELEPHONE (OUTPATIENT)
Dept: INTERNAL MEDICINE CLINIC | Facility: CLINIC | Age: 67
End: 2022-10-04

## 2022-10-04 DIAGNOSIS — F51.02 ADJUSTMENT INSOMNIA: ICD-10-CM

## 2022-10-04 DIAGNOSIS — I10 ESSENTIAL HYPERTENSION: Primary | ICD-10-CM

## 2022-10-04 RX ORDER — ALPRAZOLAM 0.25 MG/1
0.25 TABLET ORAL 2 TIMES DAILY PRN
Qty: 30 TABLET | Refills: 3 | Status: SHIPPED | OUTPATIENT
Start: 2022-10-04

## 2022-10-04 NOTE — TELEPHONE ENCOUNTER
Patient had a total knee replacement and was discharged from hospital with blood work showing low sodium  Patient was told to follow up with PCP  I will make appt  Would you like her to have blood work done prior? Also Patient would like you to send a script in for Xanax   25 mg she has been having trouble sleeping at night  Pharmacy- [facility] [address]    Patient can be reached at [phone]

## 2022-10-04 NOTE — TELEPHONE ENCOUNTER
Patient call the office  She patient recently underwent surgery and is now house   They did testing with the patient and they were following her sodium level which apparently was low during hospitalization but has improved on discharge shin last sodium 133 which is not critical   Relates that she is having some decrease in her swelling which may be helpful  Is back to her normal diet  We will send in a slip for the patient to have a sodium level done in the near future as repeat but again I am not concerned any do not feel that it is important she come into the office because this is extremely difficult especially after surgery  Patient is also asking for prescription for Xanax to help her sleep at night and this was sent to the pharmacy  She will keeps in touch and apprised of her situation

## 2022-10-05 ENCOUNTER — TRANSITIONAL CARE MANAGEMENT (OUTPATIENT)
Dept: INTERNAL MEDICINE CLINIC | Facility: CLINIC | Age: 67
End: 2022-10-05

## 2022-10-12 PROBLEM — Z00.00 MEDICARE ANNUAL WELLNESS VISIT, SUBSEQUENT: Status: RESOLVED | Noted: 2020-10-16 | Resolved: 2022-10-12

## 2022-10-19 DIAGNOSIS — E03.9 ACQUIRED HYPOTHYROIDISM: ICD-10-CM

## 2022-10-19 DIAGNOSIS — E78.00 PURE HYPERCHOLESTEROLEMIA: ICD-10-CM

## 2022-10-19 DIAGNOSIS — K29.40 ATROPHIC GASTRITIS WITHOUT HEMORRHAGE: ICD-10-CM

## 2022-10-20 RX ORDER — ROSUVASTATIN CALCIUM 5 MG/1
5 TABLET, COATED ORAL DAILY
Qty: 90 TABLET | Refills: 0 | Status: SHIPPED | OUTPATIENT
Start: 2022-10-20

## 2022-10-20 RX ORDER — LEVOTHYROXINE SODIUM 0.2 MG/1
TABLET ORAL
Qty: 90 TABLET | Refills: 1 | Status: SHIPPED | OUTPATIENT
Start: 2022-10-20

## 2022-10-20 RX ORDER — FAMOTIDINE 20 MG/1
TABLET, FILM COATED ORAL
Qty: 180 TABLET | Refills: 1 | Status: SHIPPED | OUTPATIENT
Start: 2022-10-20

## 2023-01-25 DIAGNOSIS — I10 ESSENTIAL HYPERTENSION: ICD-10-CM

## 2023-01-25 RX ORDER — VALSARTAN AND HYDROCHLOROTHIAZIDE 320; 12.5 MG/1; MG/1
TABLET, FILM COATED ORAL
Qty: 90 TABLET | Refills: 2 | Status: SHIPPED | OUTPATIENT
Start: 2023-01-25

## 2023-02-02 DIAGNOSIS — E78.00 PURE HYPERCHOLESTEROLEMIA: ICD-10-CM

## 2023-02-02 RX ORDER — ROSUVASTATIN CALCIUM 5 MG/1
5 TABLET, COATED ORAL DAILY
Qty: 90 TABLET | Refills: 0 | Status: SHIPPED | OUTPATIENT
Start: 2023-02-02

## 2023-02-10 LAB — HBA1C MFR BLD HPLC: 5.8 %

## 2023-02-13 ENCOUNTER — OFFICE VISIT (OUTPATIENT)
Dept: INTERNAL MEDICINE CLINIC | Facility: CLINIC | Age: 68
End: 2023-02-13

## 2023-02-13 VITALS
TEMPERATURE: 98.7 F | SYSTOLIC BLOOD PRESSURE: 126 MMHG | BODY MASS INDEX: 44.84 KG/M2 | HEIGHT: 59 IN | OXYGEN SATURATION: 94 % | WEIGHT: 222.4 LBS | HEART RATE: 71 BPM | DIASTOLIC BLOOD PRESSURE: 62 MMHG

## 2023-02-13 DIAGNOSIS — E03.9 ACQUIRED HYPOTHYROIDISM: ICD-10-CM

## 2023-02-13 DIAGNOSIS — I10 ESSENTIAL HYPERTENSION: ICD-10-CM

## 2023-02-13 DIAGNOSIS — R73.9 HYPERGLYCEMIA: ICD-10-CM

## 2023-02-13 DIAGNOSIS — E78.00 PURE HYPERCHOLESTEROLEMIA: ICD-10-CM

## 2023-02-13 DIAGNOSIS — Z00.00 MEDICARE ANNUAL WELLNESS VISIT, SUBSEQUENT: Primary | ICD-10-CM

## 2023-02-13 RX ORDER — NAPROXEN 500 MG/1
TABLET ORAL
COMMUNITY
Start: 2023-01-20

## 2023-02-13 RX ORDER — LORATADINE 10 MG/1
10 TABLET ORAL DAILY
COMMUNITY
Start: 2022-09-27

## 2023-02-13 RX ORDER — TRAMADOL HYDROCHLORIDE 50 MG/1
50 TABLET ORAL EVERY 6 HOURS PRN
COMMUNITY
Start: 2023-01-20

## 2023-02-13 NOTE — ASSESSMENT & PLAN NOTE
Patient has a history of hyperlipidemia  She did have a lipid profile performed prior to the visit and were happy to report to the patient that her cholesterol is showing good control  Total cholesterol is under control and patient has a very high HDL  We did discuss with the patient importance again of diet    On no treatment

## 2023-02-13 NOTE — ASSESSMENT & PLAN NOTE
TSH is slightly low  She remains on high dose of levothyroxine 200 mcg daily  We will check thyroid function with her next visit  We have made no changes in her medication at this time but would consider that in the future

## 2023-02-13 NOTE — ASSESSMENT & PLAN NOTE
Mildly elevated fasting blood sugar levels  Hemoglobin A1c is in the prediabetic range  Patient admits that she has not been watching her diet as closely and eating a lot of junk food  Not involved in any routine exercise program which we also suggest   We will continue to monitor this check a fasting blood sugar hemoglobin A1c with her next visit

## 2023-02-13 NOTE — PROGRESS NOTES
Answers for HPI/ROS submitted by the patient on 2/6/2023  How would you rate your overall health?: good  Compared to last year, how is your physical health?: slightly worse  In general, how satisfied are you with your life?: satisfied  Compared to last year, how is your eyesight?: same  Compared to last year, how is your hearing?: same  Compared to last year, how is your emotional/mental health?: same  How often is anger a problem for you?: never, rarely  How often do you feel unusually tired/fatigued?: sometimes  In the past 7 days, how much pain have you experienced?: some  If you answered "some" or "a lot", please rate the severity of your pain on a scale of 1 to 10 (1 being the least severe pain and 10 being the most intense pain)  : 7/10  In the past 6 months, have you lost or gained 10 pounds without trying?: No  Additional Comments: TKR-had me slowed down for quite awhile  One or more falls in the last year: No  In the past 6 months, have you accidentally leaked urine?: No  Do you have trouble with the stairs inside or outside your home?: Yes  Does your home have working smoke alarms?: Yes  Does your home have a carbon monoxide monitor?: Yes  Which safety hazards (if any) have you experienced in your home? Please select all that apply : none  How would you describe your current diet? Please select all that apply : Regular  Additional Comments: Snacked more than usual during rehab of TKR  In addition to prescription medications, are you taking any over-the-counter supplements?: Yes  If yes, what supplements are you taking?: 50+ female multi vitamin   Vitamin D3  Can you manage your medications?: Yes  Additional comments : Was taking naprosyn 500mg daily and Tramadol as needed for low back paina  Are you currently taking any opioid medications?: No  Can you walk and transfer into and out of your bed and chair?: Yes  Can you dress and groom yourself?: Yes  Can you bathe or shower yourself?: Yes  Can you feed yourself?: Yes  Can you do your laundry/ housekeeping?: Yes  Can you manage your money, pay your bills, and track your expenses?: Yes  Can you make your own meals?: Yes  Can you do your own shopping?: Yes  Please list your DME (Durable Medical Equipment) supplier, if you use one : [employer] Riedel when needed  Within the last 12 months, have you had any hospitalizations or Emergency Department visits?: Yes  If yes, how many times have you been hospitalized within the past year?: 1-2  Additional Comments: TKR in September  Do you have a living will?: No  Do you have a Durable POA (Power of ) for healthcare decisions?: No  Do you have an Advanced Directive for end of life decisions?: Yes  How often have you used an illegal drug (including marijuana) or a prescription medication for non-medical reasons in the past year?: never  What is the typical number of drinks you consume in a day?: 0  What is the typical number of drinks you consume in a week?: 2  How often did you have a drink containing alcohol in the past year?: 2 to 4 times a month  How many drinks did you have on a typical day  when you were drinking in the past year?: 0  How often did you have 6 or more drinks on one occasion in the past year?: never    Name: Tito Levi      : 1955      MRN: [id number]  Encounter Provider: Judy Hernandez DO  Encounter Date: 2023   Encounter department: [address] 1 Medicare annual wellness visit, subsequent  Assessment & Plan:  60-year-old female history of multiple medical problems who is here today for repeat Medicare wellness visit  She did have labs extensively performed prior to visit today and we did discuss the results at length  Patient states that she is doing relatively well    She has gained significant amount of weight since her last visit but she states this is status post total knee replacement on the right and was more sedentary and also states she has not been watching her eating habits and has gotten neglectful as far as diet is concerned also  She denies chest pain or pressure no increasing shortness of breath  She continues to follow-up with her orthopedic physician and just underwent therapeutic injection with pain management to the lumbar spine which she states was extremely successful  She did undergo physical exam today in the office with no acute abnormalities  She has made a promise that she will be working on her weight not only working on diet but also exercise program   Will be seen in the office again in approximately 6 months and we will repeat lab values prior to that visit  2  Hyperglycemia  Assessment & Plan:  Mildly elevated fasting blood sugar levels  Hemoglobin A1c is in the prediabetic range  Patient admits that she has not been watching her diet as closely and eating a lot of junk food  Not involved in any routine exercise program which we also suggest   We will continue to monitor this check a fasting blood sugar hemoglobin A1c with her next visit  Orders:  -     Hemoglobin A1C; Future    3  Acquired hypothyroidism  Assessment & Plan:  TSH is slightly low  She remains on high dose of levothyroxine 200 mcg daily  We will check thyroid function with her next visit  We have made no changes in her medication at this time but would consider that in the future  Orders:  -     TSH, 3rd generation with Free T4 reflex; Future    4  Essential hypertension  Assessment & Plan:  History of hypertension  Blood pressure showing excellent control today in the office and kidney function is stable  Patient will continue present medication and surveillance and we will continue to to monitor blood pressure readings and kidney function  Make adjustment with medication in the future if needed  Orders:  -     Basic metabolic panel; Future    5   Pure hypercholesterolemia  Assessment & Plan:  Patient has a history of hyperlipidemia  She did have a lipid profile performed prior to the visit and were happy to report to the patient that her cholesterol is showing good control  Total cholesterol is under control and patient has a very high HDL  We did discuss with the patient importance again of diet  On no treatment             Subjective     Patient is a 28-year-old female history of medical problems as outlined previously who is here today for repeat Medicare wellness visit  She did have extensive lab testing performed prior to the visit today and we did discuss the results at length with the patient  Patient relates that she recently was seen by pain management and did have a therapeutic injection to the lumbar spine which she states was extremely effective  She is upset about her weight and intends to make some changes in the near future now that she has had a knee replacement on the right and can ambulate without pain  Review of Systems   Constitutional: Positive for activity change and unexpected weight change  Negative for appetite change, chills, diaphoresis, fatigue and fever  Slowly restarting routine exercise program with less pain in her knee and back   HENT: Negative  Eyes: Negative  Respiratory: Negative  Cardiovascular: Negative  Gastrointestinal: Negative  Endocrine: Negative  Genitourinary: Negative  Musculoskeletal: Positive for arthralgias and back pain  Negative for gait problem, joint swelling, myalgias, neck pain and neck stiffness  Allergic/Immunologic: Negative  Neurological: Negative  Hematological: Negative  Psychiatric/Behavioral: Negative  Past Medical History:   Diagnosis Date   • Arthritis 2008   • Disease of thyroid gland 1976   • Hypertension 2004     Past Surgical History:   Procedure Laterality Date   • BRAIN SURGERY  11/8/2019    Benign lt   Parietal Meningioma     Family History   Problem Relation Age of Onset   • Heart disease Father    • Hypertension Mother    • Diabetes Mother         Latent   • Arthritis Mother    • Stroke Paternal Grandmother      Social History     Socioeconomic History   • Marital status: Single     Spouse name: None   • Number of children: None   • Years of education: None   • Highest education level: None   Occupational History   • None   Tobacco Use   • Smoking status: Former     Packs/day: 1 00     Years: 25 00     Pack years: 25 00     Types: Cigarettes     Quit date: 11/28/2007     Years since quitting: 15 2   • Smokeless tobacco: Never   • Tobacco comments:     Quit off & on up until 2007  Haven't smoked since   Vaping Use   • Vaping Use: Never used   Substance and Sexual Activity   • Alcohol use: No   • Drug use: Never   • Sexual activity: Not Currently     Partners: Male     Birth control/protection: None   Other Topics Concern   • None   Social History Narrative   • None     Social Determinants of Health     Financial Resource Strain: Low Risk    • Difficulty of Paying Living Expenses: Not very hard   Food Insecurity: Not on file   Transportation Needs: No Transportation Needs   • Lack of Transportation (Medical): No   • Lack of Transportation (Non-Medical):  No   Physical Activity: Not on file   Stress: Not on file   Social Connections: Not on file   Intimate Partner Violence: Not on file   Housing Stability: Not on file     Current Outpatient Medications on File Prior to Visit   Medication Sig   • ALPRAZolam (XANAX) 0 25 mg tablet Take 1 tablet (0 25 mg total) by mouth 2 (two) times a day as needed for anxiety or sleep   • amLODIPine (NORVASC) 10 mg tablet TAKE 1 TABLET BY MOUTH DAILY   • aspirin (ECOTRIN LOW STRENGTH) 81 mg EC tablet Take 81 mg by mouth   • Diclofenac Sodium (VOLTAREN) 1 % APPLY 2 GRAMS TO THE AFFECTED AREA(S) BY TOPICAL ROUTE 4 TIMES PER DAY   • famotidine (PEPCID) 20 mg tablet TAKE 1 TABLET BY MOUTH TWICE A DAY   • levETIRAcetam (KEPPRA) 250 mg tablet TK 1 T PO BID   • levothyroxine 200 mcg tablet TAKE 1 TABLET BY MOUTH EVERY DAY IN THE EARLY MORNING   • loratadine (CLARITIN) 10 mg tablet Take 10 mg by mouth daily   • Multiple Vitamin (MULTIVITAMIN ADULT PO) Take 1 tablet by mouth every other day   • naproxen (NAPROSYN) 500 mg tablet TAKE 1 TABLET TWICE A DAY BY ORAL ROUTE AS NEEDED  • rosuvastatin (CRESTOR) 5 mg tablet TAKE 1 TABLET (5 MG TOTAL) BY MOUTH DAILY  • traMADol (ULTRAM) 50 mg tablet Take 50 mg by mouth every 6 (six) hours as needed   • valsartan-hydrochlorothiazide (DIOVAN-HCT) 320-12 5 MG per tablet TAKE 1 TABLET BY MOUTH EVERY DAY   • Vitamin D, Ergocalciferol, 50 MCG (2000 UT) CAPS Take by mouth   • [DISCONTINUED] Hibiclens 4 % external liquid APPLY BY TOPICAL ROUTE TO THE OPERATIVE LEG THE NIGHT PRIOR TO SURGERY  • [DISCONTINUED] ibuprofen (MOTRIN) 200 mg tablet Take 200 mg by mouth every 6 (six) hours as needed for mild pain   • [DISCONTINUED] meloxicam (MOBIC) 15 mg tablet Take 15 mg by mouth daily   • [DISCONTINUED] mupirocin (BACTROBAN) 2 % ointment APPLY A SMALL AMOUNT TO THE BILATERAL NARES ONCE A DAY FOR FIVE DAYS PRIOR TO SURGERY     Allergies   Allergen Reactions   • Medical Tape Irritability     Paper tape was used  Immunization History   Administered Date(s) Administered   • COVID-19 MODERNA VACC 0 5 ML IM 01/07/2021, 02/05/2021, 11/02/2021   • INFLUENZA 11/25/2011   • Influenza, injectable, quadrivalent, preservative free 0 5 mL 09/19/2020   • Td (adult), Unspecified 01/01/2007       Objective     /62 (BP Location: Left arm, Patient Position: Sitting)   Pulse 71   Temp 98 7 °F (37 1 °C) (Tympanic)   Ht 4' 11" (1 499 m)   Wt 101 kg (222 lb 6 4 oz)   SpO2 94%   BMI 44 92 kg/m²     Physical Exam  Vitals and nursing note reviewed  Constitutional:       General: She is not in acute distress  Appearance: Normal appearance  She is obese  She is not ill-appearing, toxic-appearing or diaphoretic        Comments: Pleasant articulate obese 56-year-old female who is awake alert in no acute distress and oriented x3  HENT:      Head: Normocephalic and atraumatic  Right Ear: Tympanic membrane, ear canal and external ear normal  There is no impacted cerumen  Left Ear: Tympanic membrane, ear canal and external ear normal  There is no impacted cerumen  Nose: Nose normal  No congestion or rhinorrhea  Mouth/Throat:      Mouth: Mucous membranes are moist       Pharynx: Oropharynx is clear  No oropharyngeal exudate or posterior oropharyngeal erythema  Eyes:      General: No scleral icterus  Right eye: No discharge  Left eye: No discharge  Extraocular Movements: Extraocular movements intact  Conjunctiva/sclera: Conjunctivae normal       Pupils: Pupils are equal, round, and reactive to light  Neck:      Vascular: No carotid bruit  Cardiovascular:      Rate and Rhythm: Normal rate and regular rhythm  Pulses: Normal pulses  Heart sounds: Normal heart sounds  No murmur heard  No friction rub  No gallop  Pulmonary:      Effort: Pulmonary effort is normal  No respiratory distress  Breath sounds: Normal breath sounds  No stridor  No wheezing, rhonchi or rales  Chest:      Chest wall: No tenderness  Abdominal:      General: Bowel sounds are normal  There is no distension  Palpations: Abdomen is soft  There is no mass  Tenderness: There is no abdominal tenderness  There is no right CVA tenderness, left CVA tenderness, guarding or rebound  Hernia: No hernia is present  Musculoskeletal:         General: Deformity present  No swelling, tenderness or signs of injury  Cervical back: Normal range of motion and neck supple  No rigidity or tenderness  Right lower leg: Edema present  Left lower leg: Edema present  Comments: Patient on evaluation is status post total knee replacement on the right  Patient now has full range of motion to her right knee both actively and passively with no pain  Degenerative changes to her left knee  Patient does have a trace edema bilateral lower extremities wearing compressive stockings which she states she does not regular basis  Otherwise some diffuse arthritis but nothing acute   Lymphadenopathy:      Cervical: No cervical adenopathy  Skin:     General: Skin is warm and dry  Capillary Refill: Capillary refill takes less than 2 seconds  Coloration: Skin is not jaundiced or pale  Findings: No bruising, erythema, lesion or rash  Neurological:      General: No focal deficit present  Mental Status: She is alert and oriented to person, place, and time  Mental status is at baseline  Cranial Nerves: No cranial nerve deficit  Sensory: No sensory deficit  Motor: No weakness  Coordination: Coordination normal       Gait: Gait normal       Deep Tendon Reflexes: Reflexes abnormal       Comments: Absent patellar reflex on the right  No acute but some chronic neurologic 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

## 2023-02-13 NOTE — PROGRESS NOTES
Assessment and Plan:     Problem List Items Addressed This Visit    None       Preventive health issues were discussed with patient, and age appropriate screening tests were ordered as noted in patient's After Visit Summary  Personalized health advice and appropriate referrals for health education or preventive services given if needed, as noted in patient's After Visit Summary  History of Present Illness:     Patient presents for a Medicare Wellness Visit    HPI   Patient Care Team:  DO brielle Menendez PCP - General     Review of Systems:     Review of Systems     Problem List:     Patient Active Problem List   Diagnosis   • Essential hypertension   • Hyperglycemia   • Hyperlipemia   • Hypothyroidism   • Class 2 obesity due to excess calories in adult   • Acute bacterial conjunctivitis of right eye   • Vitamin D deficiency   • Meningioma, cerebral ([facility] [address] )   • Seizure disorder ([facility] [address] )   • Contusion of right chest wall   • Acute pain of right knee   • Anxiety   • Knee arthropathy      Past Medical and Surgical History:     Past Medical History:   Diagnosis Date   • Arthritis 2008   • Disease of thyroid gland 1976   • Hypertension 2004     Past Surgical History:   Procedure Laterality Date   • BRAIN SURGERY  11/8/2019    Benign lt   Parietal Meningioma      Family History:     Family History   Problem Relation Age of Onset   • Heart disease Father    • Hypertension Mother    • Diabetes Mother         Latent   • Arthritis Mother       Social History:     Social History     Socioeconomic History   • Marital status: Single     Spouse name: Not on file   • Number of children: Not on file   • Years of education: Not on file   • Highest education level: Not on file   Occupational History   • Not on file   Tobacco Use   • Smoking status: Former     Packs/day: 1 00     Years: 25 00     Pack years: 25 00     Types: Cigarettes     Quit date: 11/28/2007     Years since quitting: 15 2   • Smokeless tobacco: Never   • Tobacco comments:     Quit off & on up until 2007  Haven't smoked since   Substance and Sexual Activity   • Alcohol use: No   • Drug use: Never   • Sexual activity: Not Currently     Partners: Male     Birth control/protection: None   Other Topics Concern   • Not on file   Social History Narrative   • Not on file     Social Determinants of Health     Financial Resource Strain: Low Risk    • Difficulty of Paying Living Expenses: Not very hard   Food Insecurity: Not on file   Transportation Needs: No Transportation Needs   • Lack of Transportation (Medical): No   • Lack of Transportation (Non-Medical): No   Physical Activity: Not on file   Stress: Not on file   Social Connections: Not on file   Intimate Partner Violence: Not on file   Housing Stability: Not on file      Medications and Allergies:     Current Outpatient Medications   Medication Sig Dispense Refill   • ALPRAZolam (XANAX) 0 25 mg tablet Take 1 tablet (0 25 mg total) by mouth 2 (two) times a day as needed for anxiety or sleep 30 tablet 3   • amLODIPine (NORVASC) 10 mg tablet TAKE 1 TABLET BY MOUTH DAILY 90 tablet 3   • aspirin (ECOTRIN LOW STRENGTH) 81 mg EC tablet Take 81 mg by mouth     • Diclofenac Sodium (VOLTAREN) 1 % APPLY 2 GRAMS TO THE AFFECTED AREA(S) BY TOPICAL ROUTE 4 TIMES PER DAY     • famotidine (PEPCID) 20 mg tablet TAKE 1 TABLET BY MOUTH TWICE A  tablet 1   • Hibiclens 4 % external liquid APPLY BY TOPICAL ROUTE TO THE OPERATIVE LEG THE NIGHT PRIOR TO SURGERY       • ibuprofen (MOTRIN) 200 mg tablet Take 200 mg by mouth every 6 (six) hours as needed for mild pain     • levETIRAcetam (KEPPRA) 250 mg tablet TK 1 T PO BID     • levothyroxine 200 mcg tablet TAKE 1 TABLET BY MOUTH EVERY DAY IN THE EARLY MORNING 90 tablet 1   • meloxicam (MOBIC) 15 mg tablet Take 15 mg by mouth daily     • Multiple Vitamin (MULTIVITAMIN ADULT PO) Take 1 tablet by mouth every other day     • mupirocin (BACTROBAN) 2 % ointment APPLY A SMALL AMOUNT TO THE BILATERAL NARES ONCE A DAY FOR FIVE DAYS PRIOR TO SURGERY     • rosuvastatin (CRESTOR) 5 mg tablet TAKE 1 TABLET (5 MG TOTAL) BY MOUTH DAILY  90 tablet 0   • valsartan-hydrochlorothiazide (DIOVAN-HCT) 320-12 5 MG per tablet TAKE 1 TABLET BY MOUTH EVERY DAY 90 tablet 2   • Vitamin D, Ergocalciferol, 50 MCG (2000 UT) CAPS Take by mouth       No current facility-administered medications for this visit  Allergies   Allergen Reactions   • Medical Tape Irritability     Paper tape was used  Immunizations:     Immunization History   Administered Date(s) Administered   • COVID-19 MODERNA VACC 0 5 ML IM 01/07/2021, 02/05/2021, 11/02/2021   • INFLUENZA 11/25/2011   • Influenza, injectable, quadrivalent, preservative free 0 5 mL 09/19/2020   • Td (adult), Unspecified 01/01/2007      Health Maintenance:         Topic Date Due   • Hepatitis C Screening  Never done   • Breast Cancer Screening: Mammogram  01/30/2024   • Colorectal Cancer Screening  11/01/2026         Topic Date Due   • Pneumococcal Vaccine: 65+ Years (1 - PCV) Never done   • COVID-19 Vaccine (4 - Booster for Moderna series) 12/28/2021   • Influenza Vaccine (1) 09/01/2022      Medicare Screening Tests and Risk Assessments:         Health Risk Assessment:   Patient rates overall health as good  Patient feels that their physical health rating is slightly worse  Patient is satisfied with their life  Eyesight was rated as same  Hearing was rated as same  Patient feels that their emotional and mental health rating is same  Patients states they are never, rarely angry  Patient states they are sometimes unusually tired/fatigued  Pain experienced in the last 7 days has been some  Patient's pain rating has been 7/10  Patient states that she has experienced no weight loss or gain in last 6 months  [facility]-had me slowed down for quite awhile    Fall Risk Screening:    In the past year, patient has experienced: no history of falling in past year      Urinary Incontinence Screening: Patient has not leaked urine accidently in the last six months  Home Safety:  Patient has trouble with stairs inside or outside of their home  Patient has working smoke alarms and has working carbon monoxide detector  Home safety hazards include: none  Nutrition:   Current diet is Regular  Snacked more than usual during rehab of [facility]    Medications:   Patient is currently taking over-the-counter supplements  OTC medications include: 50+ female multi vitamin  Vitamin D3  Patient is able to manage medications  Was taking naprosyn 500mg daily and Tramadol as needed for low back paina    Activities of Daily Living (ADLs)/Instrumental Activities of Daily Living (IADLs):   Walk and transfer into and out of bed and chair?: Yes  Dress and groom yourself?: Yes    Bathe or shower yourself?: Yes    Feed yourself? Yes  Do your laundry/housekeeping?: Yes  Manage your money, pay your bills and track your expenses?: Yes  Make your own meals?: Yes    Do your own shopping?: Yes    Durable Medical Equipment Suppliers  Raul Byrne when needed    Previous Hospitalizations:   Any hospitalizations or ED visits within the last 12 months?: Yes    How many hospitalizations have you had in the last year?: 1-2    Hospitalization Comments: [facility] in September      Advance Care Planning:   Living will: No    Durable POA for healthcare: No    Advanced directive: Yes      PREVENTIVE SCREENINGS      Cardiovascular Screening:    General: Screening Not Indicated and History Lipid Disorder      Diabetes Screening:     General: Screening Current      Colorectal Cancer Screening:     General: Screening Current      Breast Cancer Screening:     General: Screening Current      Cervical Cancer Screening:    General: Screening Not Indicated      Lung Cancer Screening:     General: Screening Not Indicated    Screening, Brief Intervention, and Referral to Treatment (SBIRT)    Screening  Typical number of drinks in a day: 0  Typical number of drinks in a week: 2  Interpretation: Low risk drinking behavior  AUDIT-C Screenin) How often did you have a drink containing alcohol in the past year? 2 to 4 times a month  2) How many drinks did you have on a typical day when you were drinking in the past year? 0  3) How often did you have 6 or more drinks on one occasion in the past year? never    AUDIT-C Score: 2  Interpretation: Score 0-2 (female): Negative screen for alcohol misuse    Single Item Drug Screening:  How often have you used an illegal drug (including marijuana) or a prescription medication for non-medical reasons in the past year? never    Single Item Drug Screen Score: 0  Interpretation: Negative screen for possible drug use disorder    No results found  Physical Exam:     There were no vitals taken for this visit      Physical Exam     Lawrence Setting, DO

## 2023-02-13 NOTE — PATIENT INSTRUCTIONS
Medicare Preventive Visit Patient Instructions  Thank you for completing your Welcome to Medicare Visit or Medicare Annual Wellness Visit today  Your next wellness visit will be due in one year (2/14/2024)  The screening/preventive services that you may require over the next 5-10 years are detailed below  Some tests may not apply to you based off risk factors and/or age  Screening tests ordered at today's visit but not completed yet may show as past due  Also, please note that scanned in results may not display below  Preventive Screenings:  Service Recommendations Previous Testing/Comments   Colorectal Cancer Screening  * Colonoscopy    * Fecal Occult Blood Test (FOBT)/Fecal Immunochemical Test (FIT)  * Fecal DNA/Cologuard Test  * Flexible Sigmoidoscopy Age: 39-70 years old   Colonoscopy: every 10 years (may be performed more frequently if at higher risk)  OR  FOBT/FIT: every 1 year  OR  Cologuard: every 3 years  OR  Sigmoidoscopy: every 5 years  Screening may be recommended earlier than age 39 if at higher risk for colorectal cancer  Also, an individualized decision between you and your healthcare provider will decide whether screening between the ages of 74-80 would be appropriate  Colonoscopy: 11/01/2021  FOBT/FIT: Not on file  Cologuard: Not on file  Sigmoidoscopy: Not on file    Screening Current     Breast Cancer Screening Age: 36 years old  Frequency: every 1-2 years  Not required if history of left and right mastectomy Mammogram: 01/30/2023    Screening Current   Cervical Cancer Screening Between the ages of 21-29, pap smear recommended once every 3 years  Between the ages of 33-67, can perform pap smear with HPV co-testing every 5 years     Recommendations may differ for women with a history of total hysterectomy, cervical cancer, or abnormal pap smears in past  Pap Smear: Not on file    Screening Not Indicated   Hepatitis C Screening Once for adults born between 1945 and 1965  More frequently in patients at high risk for Hepatitis C Hep C Antibody: Not on file        Diabetes Screening 1-2 times per year if you're at risk for diabetes or have pre-diabetes Fasting glucose: No results in last 5 years (No results in last 5 years)  A1C: 5 8 % (2/10/2023)  Screening Current   Cholesterol Screening Once every 5 years if you don't have a lipid disorder  May order more often based on risk factors  Lipid panel: 02/10/2023    Screening Not Indicated  History Lipid Disorder     Other Preventive Screenings Covered by Medicare:  1  Abdominal Aortic Aneurysm (AAA) Screening: covered once if your at risk  You're considered to be at risk if you have a family history of AAA  2  Lung Cancer Screening: covers low dose CT scan once per year if you meet all of the following conditions: (1) Age 50-69; (2) No signs or symptoms of lung cancer; (3) Current smoker or have quit smoking within the last 15 years; (4) You have a tobacco smoking history of at least 20 pack years (packs per day multiplied by number of years you smoked); (5) You get a written order from a healthcare provider  3  Glaucoma Screening: covered annually if you're considered high risk: (1) You have diabetes OR (2) Family history of glaucoma OR (3)  aged 48 and older OR (3)  American aged 72 and older  3  Osteoporosis Screening: covered every 2 years if you meet one of the following conditions: (1) You're estrogen deficient and at risk for osteoporosis based off medical history and other findings; (2) Have a vertebral abnormality; (3) On glucocorticoid therapy for more than 3 months; (4) Have primary hyperparathyroidism; (5) On osteoporosis medications and need to assess response to drug therapy  · Last bone density test (DXA Scan): Not on file  5  HIV Screening: covered annually if you're between the age of 12-76  Also covered annually if you are younger than 13 and older than 72 with risk factors for HIV infection   For pregnant patients, it is covered up to 3 times per pregnancy  Immunizations:  Immunization Recommendations   Influenza Vaccine Annual influenza vaccination during flu season is recommended for all persons aged >= 6 months who do not have contraindications   Pneumococcal Vaccine   * Pneumococcal conjugate vaccine = PCV13 (Prevnar 13), PCV15 (Vaxneuvance), PCV20 (Prevnar 20)  * Pneumococcal polysaccharide vaccine = PPSV23 (Pneumovax) Adults 25-60 years old: 1-3 doses may be recommended based on certain risk factors  Adults 72 years old: 1-2 doses may be recommended based off what pneumonia vaccine you previously received   Hepatitis B Vaccine 3 dose series if at intermediate or high risk (ex: diabetes, end stage renal disease, liver disease)   Tetanus (Td) Vaccine - COST NOT COVERED BY MEDICARE PART B Following completion of primary series, a booster dose should be given every 10 years to maintain immunity against tetanus  Td may also be given as tetanus wound prophylaxis  Tdap Vaccine - COST NOT COVERED BY MEDICARE PART B Recommended at least once for all adults  For pregnant patients, recommended with each pregnancy  Shingles Vaccine (Shingrix) - COST NOT COVERED BY MEDICARE PART B  2 shot series recommended in those aged 48 and above     Health Maintenance Due:      Topic Date Due   • Hepatitis C Screening  Never done   • Breast Cancer Screening: Mammogram  01/30/2024   • Colorectal Cancer Screening  11/01/2026     Immunizations Due:      Topic Date Due   • Pneumococcal Vaccine: 65+ Years (1 - PCV) Never done   • COVID-19 Vaccine (4 - Booster for Moderna series) 12/28/2021   • Influenza Vaccine (1) 09/01/2022     Advance Directives   What are advance directives? Advance directives are legal documents that state your wishes and plans for medical care  These plans are made ahead of time in case you lose your ability to make decisions for yourself   Advance directives can apply to any medical decision, such as the treatments you want, and if you want to donate organs  What are the types of advance directives? There are many types of advance directives, and each state has rules about how to use them  You may choose a combination of any of the following:  · Living will: This is a written record of the treatment you want  You can also choose which treatments you do not want, which to limit, and which to stop at a certain time  This includes surgery, medicine, IV fluid, and tube feedings  · Durable power of  for healthcare [facility]): This is a written record that states who you want to make healthcare choices for you when you are unable to make them for yourself  This person, called a proxy, is usually a family member or a friend  You may choose more than 1 proxy  · Do not resuscitate (DNR) order:  A DNR order is used in case your heart stops beating or you stop breathing  It is a request not to have certain forms of treatment, such as CPR  A DNR order may be included in other types of advance directives  · Medical directive: This covers the care that you want if you are in a coma, near death, or unable to make decisions for yourself  You can list the treatments you want for each condition  Treatment may include pain medicine, surgery, blood transfusions, dialysis, IV or tube feedings, and a ventilator (breathing machine)  · Values history: This document has questions about your views, beliefs, and how you feel and think about life  This information can help others choose the care that you would choose  Why are advance directives important? An advance directive helps you control your care  Although spoken wishes may be used, it is better to have your wishes written down  Spoken wishes can be misunderstood, or not followed  Treatments may be given even if you do not want them  An advance directive may make it easier for your family to make difficult choices about your care     Weight Management   Why it is important to manage your weight:  Being overweight increases your risk of health conditions such as heart disease, high blood pressure, type 2 diabetes, and certain types of cancer  It can also increase your risk for osteoarthritis, sleep apnea, and other respiratory problems  Aim for a slow, steady weight loss  Even a small amount of weight loss can lower your risk of health problems  How to lose weight safely:  A safe and healthy way to lose weight is to eat fewer calories and get regular exercise  You can lose up about 1 pound a week by decreasing the number of calories you eat by 500 calories each day  Healthy meal plan for weight management:  A healthy meal plan includes a variety of foods, contains fewer calories, and helps you stay healthy  A healthy meal plan includes the following:  · Eat whole-grain foods more often  A healthy meal plan should contain fiber  Fiber is the part of grains, fruits, and vegetables that is not broken down by your body  Whole-grain foods are healthy and provide extra fiber in your diet  Some examples of whole-grain foods are whole-wheat breads and pastas, oatmeal, brown rice, and bulgur  · Eat a variety of vegetables every day  Include dark, leafy greens such as spinach, kale, chet greens, and mustard greens  Eat yellow and orange vegetables such as carrots, sweet potatoes, and winter squash  · Eat a variety of fruits every day  Choose fresh or canned fruit (canned in its own juice or light syrup) instead of juice  Fruit juice has very little or no fiber  · Eat low-fat dairy foods  Drink fat-free (skim) milk or 1% milk  Eat fat-free yogurt and low-fat cottage cheese  Try low-fat cheeses such as mozzarella and other reduced-fat cheeses  · Choose meat and other protein foods that are low in fat  Choose beans or other legumes such as split peas or lentils  Choose fish, skinless poultry (chicken or turkey), or lean cuts of red meat (beef or pork)   Before you cook meat or poultry, cut off any visible fat  · Use less fat and oil  Try baking foods instead of frying them  Add less fat, such as margarine, sour cream, regular salad dressing and mayonnaise to foods  Eat fewer high-fat foods  Some examples of high-fat foods include french fries, doughnuts, ice cream, and cakes  · Eat fewer sweets  Limit foods and drinks that are high in sugar  This includes candy, cookies, regular soda, and sweetened drinks  Exercise:  Exercise at least 30 minutes per day on most days of the week  Some examples of exercise include walking, biking, dancing, and swimming  You can also fit in more physical activity by taking the stairs instead of the elevator or parking farther away from stores  Ask your healthcare provider about the best exercise plan for you  © Copyright [employer] 2018 Information is for End User's use only and may not be sold, redistributed or otherwise used for commercial purposes   All illustrations and images included in CareNotes® are the copyrighted property of A D A M , Inc  or [address] [place] [address]

## 2023-02-13 NOTE — ASSESSMENT & PLAN NOTE
History of hypertension  Blood pressure showing excellent control today in the office and kidney function is stable  Patient will continue present medication and surveillance and we will continue to to monitor blood pressure readings and kidney function  Make adjustment with medication in the future if needed

## 2023-02-13 NOTE — ASSESSMENT & PLAN NOTE
27-year-old female history of multiple medical problems who is here today for repeat Medicare wellness visit  She did have labs extensively performed prior to visit today and we did discuss the results at length  Patient states that she is doing relatively well  She has gained significant amount of weight since her last visit but she states this is status post total knee replacement on the right and was more sedentary and also states she has not been watching her eating habits and has gotten neglectful as far as diet is concerned also  She denies chest pain or pressure no increasing shortness of breath  She continues to follow-up with her orthopedic physician and just underwent therapeutic injection with pain management to the lumbar spine which she states was extremely successful  She did undergo physical exam today in the office with no acute abnormalities  She has made a promise that she will be working on her weight not only working on diet but also exercise program   Will be seen in the office again in approximately 6 months and we will repeat lab values prior to that visit

## 2023-04-14 PROBLEM — Z00.00 MEDICARE ANNUAL WELLNESS VISIT, SUBSEQUENT: Status: RESOLVED | Noted: 2020-10-16 | Resolved: 2023-04-14

## 2023-04-26 NOTE — PROGRESS NOTES
BMI Counseling: Body mass index is 44 41 kg/m²  Discussed the patient's BMI with her  The BMI is above average  BMI counseling and education was provided to the patient  Nutrition recommendations include reducing portion sizes  Sski Pregnancy And Lactation Text: This medication is Pregnancy Category D and isn't considered safe during pregnancy. It is excreted in breast milk.

## 2023-05-03 DIAGNOSIS — K29.40 ATROPHIC GASTRITIS WITHOUT HEMORRHAGE: ICD-10-CM

## 2023-05-03 RX ORDER — FAMOTIDINE 20 MG/1
TABLET, FILM COATED ORAL
Qty: 180 TABLET | Refills: 1 | Status: SHIPPED | OUTPATIENT
Start: 2023-05-03

## 2023-06-03 DIAGNOSIS — E03.9 ACQUIRED HYPOTHYROIDISM: ICD-10-CM

## 2023-06-04 RX ORDER — LEVOTHYROXINE SODIUM 0.2 MG/1
TABLET ORAL
Qty: 90 TABLET | Refills: 1 | Status: SHIPPED | OUTPATIENT
Start: 2023-06-04

## 2023-07-15 DIAGNOSIS — E78.00 PURE HYPERCHOLESTEROLEMIA: ICD-10-CM

## 2023-07-15 RX ORDER — ROSUVASTATIN CALCIUM 5 MG/1
5 TABLET, COATED ORAL DAILY
Qty: 90 TABLET | Refills: 0 | Status: SHIPPED | OUTPATIENT
Start: 2023-07-15

## 2023-07-29 DIAGNOSIS — I10 ESSENTIAL HYPERTENSION: ICD-10-CM

## 2023-07-29 RX ORDER — VALSARTAN AND HYDROCHLOROTHIAZIDE 320; 12.5 MG/1; MG/1
TABLET, FILM COATED ORAL
Qty: 90 TABLET | Refills: 2 | Status: SHIPPED | OUTPATIENT
Start: 2023-07-29

## 2023-09-06 DIAGNOSIS — K29.40 ATROPHIC GASTRITIS WITHOUT HEMORRHAGE: ICD-10-CM

## 2023-09-06 DIAGNOSIS — I10 ESSENTIAL HYPERTENSION: ICD-10-CM

## 2023-09-06 RX ORDER — FAMOTIDINE 20 MG/1
TABLET, FILM COATED ORAL
Qty: 180 TABLET | Refills: 1 | Status: SHIPPED | OUTPATIENT
Start: 2023-09-06

## 2023-09-06 RX ORDER — AMLODIPINE BESYLATE 10 MG/1
10 TABLET ORAL DAILY
Qty: 90 TABLET | Refills: 3 | Status: SHIPPED | OUTPATIENT
Start: 2023-09-06

## 2023-09-30 LAB — HBA1C MFR BLD HPLC: 6 %

## 2023-10-03 ENCOUNTER — OFFICE VISIT (OUTPATIENT)
Dept: INTERNAL MEDICINE CLINIC | Facility: CLINIC | Age: 68
End: 2023-10-03
Payer: MEDICARE

## 2023-10-03 VITALS
SYSTOLIC BLOOD PRESSURE: 126 MMHG | DIASTOLIC BLOOD PRESSURE: 70 MMHG | BODY MASS INDEX: 43.75 KG/M2 | HEART RATE: 70 BPM | TEMPERATURE: 98.4 F | HEIGHT: 59 IN | OXYGEN SATURATION: 98 % | WEIGHT: 217 LBS

## 2023-10-03 DIAGNOSIS — Z12.31 BREAST CANCER SCREENING BY MAMMOGRAM: ICD-10-CM

## 2023-10-03 DIAGNOSIS — Z23 ENCOUNTER FOR IMMUNIZATION: ICD-10-CM

## 2023-10-03 DIAGNOSIS — M81.0 POSTMENOPAUSAL BONE LOSS: ICD-10-CM

## 2023-10-03 DIAGNOSIS — E55.9 VITAMIN D DEFICIENCY: ICD-10-CM

## 2023-10-03 DIAGNOSIS — F41.9 ANXIETY: ICD-10-CM

## 2023-10-03 DIAGNOSIS — R73.9 HYPERGLYCEMIA: ICD-10-CM

## 2023-10-03 DIAGNOSIS — I10 ESSENTIAL HYPERTENSION: ICD-10-CM

## 2023-10-03 DIAGNOSIS — E03.9 ACQUIRED HYPOTHYROIDISM: Primary | ICD-10-CM

## 2023-10-03 DIAGNOSIS — E78.00 PURE HYPERCHOLESTEROLEMIA: ICD-10-CM

## 2023-10-03 DIAGNOSIS — Z00.00 MEDICARE ANNUAL WELLNESS VISIT, SUBSEQUENT: ICD-10-CM

## 2023-10-03 PROCEDURE — G0008 ADMIN INFLUENZA VIRUS VAC: HCPCS | Performed by: INTERNAL MEDICINE

## 2023-10-03 PROCEDURE — 99214 OFFICE O/P EST MOD 30 MIN: CPT | Performed by: INTERNAL MEDICINE

## 2023-10-03 PROCEDURE — 90662 IIV NO PRSV INCREASED AG IM: CPT | Performed by: INTERNAL MEDICINE

## 2023-10-03 RX ORDER — LEVOTHYROXINE SODIUM 175 UG/1
175 TABLET ORAL
Qty: 90 TABLET | Refills: 3 | Status: SHIPPED | OUTPATIENT
Start: 2023-10-03

## 2023-10-03 NOTE — ASSESSMENT & PLAN NOTE
History of hyperlipidemia. Remains on Crestor 5 mg daily. As noted we have discussed diet with the patient looking at her intake of fats and cholesterol and we will continue to monitor and check a lipid profile with her next visit make adjustment with medication if needed.

## 2023-10-03 NOTE — ASSESSMENT & PLAN NOTE
Patient will be due for Medicare wellness visit when she returns to the office in 4 months. Given a slip for complete labs to be performed prior to the visit. In the interim patient was told if any new medical problems or concerns to please call.

## 2023-10-03 NOTE — ASSESSMENT & PLAN NOTE
Patient did have labs performed prior to the visit today her hemoglobin A1c has gone up to 6. This still places her in a prediabetic range. She admits over the past few months she has not been compliant as far as diet is concerned she intends to make some changes in the near future not only with her diet but exercise program in order to keep her sugar down. Check a fasting blood sugar hemoglobin A1c with her next visit.

## 2023-10-03 NOTE — PROGRESS NOTES
Name: Latesha Hoyt      : 1955      MRN: [id number]  Encounter Provider: Maricruz Campos DO  Encounter Date: 10/3/2023   Encounter department: [facility]    Assessment & Plan     1. Acquired hypothyroidism  Assessment & Plan:  Because of the changes with her thyroid function we have made a modification in her treatment dose. Now will be taking 175 mcg of levothyroxine daily. Check on thyroid function with her next visit. Orders:  -     TSH, 3rd generation with Free T4 reflex; Future  -     levothyroxine (Euthyrox) 175 mcg tablet; Take 1 tablet (175 mcg total) by mouth daily in the early morning    2. Encounter for immunization  -     influenza vaccine, high-dose, PF 0.7 mL (FLUZONE HIGH-DOSE)    3. Hyperglycemia  Assessment & Plan:  Patient did have labs performed prior to the visit today her hemoglobin A1c has gone up to 6. This still places her in a prediabetic range. She admits over the past few months she has not been compliant as far as diet is concerned she intends to make some changes in the near future not only with her diet but exercise program in order to keep her sugar down. Check a fasting blood sugar hemoglobin A1c with her next visit. Orders:  -     Hemoglobin A1C; Future    4. Vitamin D deficiency  Assessment & Plan:  Continues to take vitamin D supplements calcium level is slightly high. Was told to reduce the dosage to 1000 international units daily. Check vitamin D level and calcium level with her next visit. Orders:  -     Vitamin D 25 hydroxy; Future    5. Anxiety  Assessment & Plan:  Patient admits that she has had a significant decrease in anxiety disorder since her residential. She only takes the Xanax intermittently. States that she is more dependent on this medication to help reduce difficulties with sleep. 6. Pure hypercholesterolemia  Assessment & Plan:  History of hyperlipidemia. Remains on Crestor 5 mg daily.   As noted we have discussed diet with the patient looking at her intake of fats and cholesterol and we will continue to monitor and check a lipid profile with her next visit make adjustment with medication if needed. 7. Medicare annual wellness visit, subsequent  Assessment & Plan:  Patient will be due for Medicare wellness visit when she returns to the office in 4 months. Given a slip for complete labs to be performed prior to the visit. In the interim patient was told if any new medical problems or concerns to please call. Orders:  -     Comprehensive metabolic panel; Future  -     CBC and differential; Future  -     Lipid panel; Future  -     UA (URINE) with reflex to Scope; Future; Expected date: 10/03/2023  -     Hemoglobin A1C; Future  -     TSH, 3rd generation with Free T4 reflex; Future  -     DXA bone density spine hip and pelvis; Future; Expected date: 10/03/2023  -     Mammo screening bilateral w cad; Future; Expected date: 10/03/2023    8. Breast cancer screening by mammogram  -     Mammo screening bilateral w cad; Future; Expected date: 10/03/2023    9. Postmenopausal bone loss  -     DXA bone density spine hip and pelvis; Future; Expected date: 10/03/2023    10. Essential hypertension  Assessment & Plan:  Blood pressures controlled, renal function is stable. We will continue present medication and surveillance. Subjective     Patient is a 41-year-old female history of multiple medical problems outlined previously who is here today for routine follow-up. She did have labs performed prior to the visit today we did discuss her results at length with the patient including abnormalities. Patient relates in general doing well. Is now officially retired. Feels that she is under less stress and having less problems with anxiety. Review of Systems   Constitutional: Negative. HENT: Negative. Eyes: Negative. Respiratory: Negative. Cardiovascular: Negative. Gastrointestinal: Negative. Endocrine: Negative. Genitourinary: Negative. Musculoskeletal: Negative. Skin: Negative. Allergic/Immunologic: Negative. Neurological: Negative. Hematological: Negative. Psychiatric/Behavioral: Negative. Past Medical History:   Diagnosis Date   • Arthritis 2008   • Disease of thyroid gland 1976   • Hypertension 2004     Past Surgical History:   Procedure Laterality Date   • BRAIN SURGERY  11/8/2019    Benign lt. Parietal Meningioma     Family History   Problem Relation Age of Onset   • Heart disease Father    • Hypertension Mother    • Diabetes Mother         Latent   • Arthritis Mother    • Stroke Paternal Grandmother      Social History     Socioeconomic History   • Marital status: Single     Spouse name: None   • Number of children: None   • Years of education: None   • Highest education level: None   Occupational History   • None   Tobacco Use   • Smoking status: Former     Packs/day: 1.00     Years: 25.00     Total pack years: 25.00     Types: Cigarettes     Quit date: 11/28/2007     Years since quitting: 15.8   • Smokeless tobacco: Never   • Tobacco comments:     Quit off & on up until 2007. Haven't smoked since   Vaping Use   • Vaping Use: Never used   Substance and Sexual Activity   • Alcohol use: No   • Drug use: Never   • Sexual activity: Not Currently     Partners: Male     Birth control/protection: None   Other Topics Concern   • None   Social History Narrative   • None     Social Determinants of Health     Financial Resource Strain: Low Risk  (2/6/2023)    Overall Financial Resource Strain (CARDIA)    • Difficulty of Paying Living Expenses: Not very hard   Food Insecurity: Not on file   Transportation Needs: No Transportation Needs (2/6/2023)    PRAPARE - Transportation    • Lack of Transportation (Medical): No    • Lack of Transportation (Non-Medical):  No   Physical Activity: Not on file   Stress: Not on file   Social Connections: Not on file   Intimate Partner Violence: Not on file   Housing Stability: Not on file     Current Outpatient Medications on File Prior to Visit   Medication Sig   • ALPRAZolam (XANAX) 0.25 mg tablet Take 1 tablet (0.25 mg total) by mouth 2 (two) times a day as needed for anxiety or sleep   • amLODIPine (NORVASC) 10 mg tablet TAKE 1 TABLET BY MOUTH EVERY DAY   • aspirin (ECOTRIN LOW STRENGTH) 81 mg EC tablet Take 81 mg by mouth   • famotidine (PEPCID) 20 mg tablet TAKE 1 TABLET BY MOUTH TWICE A DAY   • levETIRAcetam (KEPPRA) 250 mg tablet TK 1 T PO BID   • loratadine (CLARITIN) 10 mg tablet Take 10 mg by mouth daily   • Multiple Vitamin (MULTIVITAMIN ADULT PO) Take 1 tablet by mouth every other day   • naproxen (NAPROSYN) 500 mg tablet TAKE 1 TABLET TWICE A DAY BY ORAL ROUTE AS NEEDED. • rosuvastatin (CRESTOR) 5 mg tablet TAKE 1 TABLET (5 MG TOTAL) BY MOUTH DAILY. • traMADol (ULTRAM) 50 mg tablet Take 50 mg by mouth every 6 (six) hours as needed   • valsartan-hydrochlorothiazide (DIOVAN-HCT) 320-12.5 MG per tablet TAKE 1 TABLET BY MOUTH EVERY DAY   • Vitamin D, Ergocalciferol, 50 MCG (2000 UT) CAPS Take by mouth   • [DISCONTINUED] levothyroxine 200 mcg tablet TAKE 1 TABLET BY MOUTH EVERY DAY IN THE EARLY MORNING   • Diclofenac Sodium (VOLTAREN) 1 % APPLY 2 GRAMS TO THE AFFECTED AREA(S) BY TOPICAL ROUTE 4 TIMES PER DAY     Allergies   Allergen Reactions   • Medical Tape Irritability     Paper tape was used.       Immunization History   Administered Date(s) Administered   • COVID-19 MODERNA VACC 0.5 ML IM 01/07/2021, 02/05/2021, 11/02/2021   • INFLUENZA 11/25/2011   • Influenza, high dose seasonal 0.7 mL 10/03/2023   • Influenza, injectable, quadrivalent, preservative free 0.5 mL 09/19/2020   • Td (adult), Unspecified 01/01/2007       Objective     /70 (BP Location: Left arm, Patient Position: Sitting, Cuff Size: Large)   Pulse 70   Temp 98.4 °F (36.9 °C)   Ht 4' 11" (1.499 m)   Wt 98.4 kg (217 lb)   SpO2 98%   BMI 43.83 kg/m²     Physical Exam  Vitals and nursing note reviewed. Constitutional:       General: She is not in acute distress. Appearance: Normal appearance. She is obese. She is not ill-appearing, toxic-appearing or diaphoretic. Comments: Pleasant, articulate, obese 56-year-old female who is awake alert in no acute distress oriented x3   HENT:      Head: Normocephalic and atraumatic. Right Ear: Tympanic membrane, ear canal and external ear normal. There is no impacted cerumen. Left Ear: Tympanic membrane, ear canal and external ear normal. There is no impacted cerumen. Nose: Nose normal. No congestion or rhinorrhea. Mouth/Throat:      Mouth: Mucous membranes are moist.      Pharynx: Oropharynx is clear. No oropharyngeal exudate or posterior oropharyngeal erythema. Eyes:      General: No scleral icterus. Right eye: No discharge. Left eye: No discharge. Extraocular Movements: Extraocular movements intact. Conjunctiva/sclera: Conjunctivae normal.      Pupils: Pupils are equal, round, and reactive to light. Neck:      Vascular: No carotid bruit. Cardiovascular:      Rate and Rhythm: Normal rate and regular rhythm. Pulses: Normal pulses. Heart sounds: Normal heart sounds. No murmur heard. No friction rub. No gallop. Pulmonary:      Effort: Pulmonary effort is normal. No respiratory distress. Breath sounds: Normal breath sounds. No stridor. No wheezing, rhonchi or rales. Chest:      Chest wall: No tenderness. Abdominal:      General: Bowel sounds are normal. There is no distension. Palpations: Abdomen is soft. There is no mass. Tenderness: There is no abdominal tenderness. There is no right CVA tenderness, left CVA tenderness, guarding or rebound. Hernia: No hernia is present. Musculoskeletal:         General: No swelling, tenderness, deformity or signs of injury. Cervical back: Normal range of motion and neck supple.  No rigidity or tenderness. Right lower leg: Edema present. Left lower leg: Edema present. Comments: Chronic trace edema bilateral lower extremities   Lymphadenopathy:      Cervical: No cervical adenopathy. Skin:     General: Skin is warm and dry. Capillary Refill: Capillary refill takes less than 2 seconds. Coloration: Skin is not jaundiced or pale. Findings: No bruising, erythema, lesion or rash. Neurological:      General: No focal deficit present. Mental Status: She is alert and oriented to person, place, and time. Mental status is at baseline. Cranial Nerves: No cranial nerve deficit. Sensory: No sensory deficit. Motor: No weakness. Coordination: Coordination normal.      Gait: Gait normal.      Deep Tendon Reflexes: Reflexes abnormal.      Comments: Absent patellar reflex on the right. Psychiatric:         Mood and Affect: Mood normal.         Behavior: Behavior normal.         Thought Content:  Thought content normal.         Judgment: Judgment normal.       Kaia Arthur,

## 2023-10-03 NOTE — ASSESSMENT & PLAN NOTE
Continues to take vitamin D supplements calcium level is slightly high. Was told to reduce the dosage to 1000 international units daily. Check vitamin D level and calcium level with her next visit.

## 2023-10-03 NOTE — ASSESSMENT & PLAN NOTE
Because of the changes with her thyroid function we have made a modification in her treatment dose. Now will be taking 175 mcg of levothyroxine daily. Check on thyroid function with her next visit.

## 2023-10-03 NOTE — ASSESSMENT & PLAN NOTE
Blood pressures controlled, renal function is stable. We will continue present medication and surveillance.

## 2023-10-03 NOTE — ASSESSMENT & PLAN NOTE
Patient admits that she has had a significant decrease in anxiety disorder since her [facility]. She only takes the Xanax intermittently. States that she is more dependent on this medication to help reduce difficulties with sleep.

## 2023-10-12 DIAGNOSIS — E78.00 PURE HYPERCHOLESTEROLEMIA: ICD-10-CM

## 2023-10-12 RX ORDER — ROSUVASTATIN CALCIUM 5 MG/1
5 TABLET, COATED ORAL DAILY
Qty: 90 TABLET | Refills: 0 | Status: SHIPPED | OUTPATIENT
Start: 2023-10-12 | End: 2023-10-20

## 2023-10-20 DIAGNOSIS — E78.00 PURE HYPERCHOLESTEROLEMIA: ICD-10-CM

## 2023-10-20 RX ORDER — ROSUVASTATIN CALCIUM 5 MG/1
5 TABLET, COATED ORAL DAILY
Qty: 90 TABLET | Refills: 0 | Status: SHIPPED | OUTPATIENT
Start: 2023-10-20

## 2023-12-02 PROBLEM — Z00.00 MEDICARE ANNUAL WELLNESS VISIT, SUBSEQUENT: Status: RESOLVED | Noted: 2020-10-16 | Resolved: 2023-12-02

## 2024-01-09 ENCOUNTER — TELEPHONE (OUTPATIENT)
Dept: INTERNAL MEDICINE CLINIC | Facility: CLINIC | Age: 69
End: 2024-01-09

## 2024-01-09 DIAGNOSIS — B36.9 FUNGAL DERMATITIS: Primary | ICD-10-CM

## 2024-01-09 RX ORDER — NYSTATIN 100000 [USP'U]/G
POWDER TOPICAL 3 TIMES DAILY
Qty: 30 G | Refills: 3 | Status: SHIPPED | OUTPATIENT
Start: 2024-01-09

## 2024-01-09 NOTE — TELEPHONE ENCOUNTER
Pt called, She has a heat rash in her left groin area, She's had before and her normal cortisone cream and antifungal powder are not clearing it up altogether. Pt had it for over a week, no pain, no fever, it has a prickly sensation.  She takes a cold shower, pats dry. She just tried Vaseline to see if that helps.   Any other ideas, Please advise.     Pt can be reached at [phone]

## 2024-01-09 NOTE — TELEPHONE ENCOUNTER
Discussed with the patient.  Try to keep the area is clean and dry as possible.  Prescription for nystatin powder was sent to the pharmacy.

## 2024-01-09 NOTE — ASSESSMENT & PLAN NOTE
Patient has developed a fungal dermatitis in her groin left-hand side.  She states that she has been using topical powder and cortisone cream not improving.  She has never used nystatin powder and we feel this is appropriate.  This was sent to the pharmacy and she was told if not improving to please call.

## 2024-01-15 DIAGNOSIS — E78.00 PURE HYPERCHOLESTEROLEMIA: ICD-10-CM

## 2024-01-16 RX ORDER — ROSUVASTATIN CALCIUM 5 MG/1
5 TABLET, COATED ORAL DAILY
Qty: 90 TABLET | Refills: 0 | Status: SHIPPED | OUTPATIENT
Start: 2024-01-16

## 2024-02-07 DIAGNOSIS — Z00.00 MEDICARE ANNUAL WELLNESS VISIT, SUBSEQUENT: ICD-10-CM

## 2024-02-07 DIAGNOSIS — Z12.31 BREAST CANCER SCREENING BY MAMMOGRAM: ICD-10-CM

## 2024-02-12 ENCOUNTER — APPOINTMENT (OUTPATIENT)
Dept: LAB | Facility: CLINIC | Age: 69
End: 2024-02-12
Payer: MEDICARE

## 2024-02-12 DIAGNOSIS — E55.9 VITAMIN D DEFICIENCY: ICD-10-CM

## 2024-02-12 DIAGNOSIS — Z00.00 MEDICARE ANNUAL WELLNESS VISIT, SUBSEQUENT: ICD-10-CM

## 2024-02-12 DIAGNOSIS — R73.9 HYPERGLYCEMIA: ICD-10-CM

## 2024-02-12 DIAGNOSIS — I10 ESSENTIAL HYPERTENSION: ICD-10-CM

## 2024-02-12 DIAGNOSIS — E03.9 ACQUIRED HYPOTHYROIDISM: ICD-10-CM

## 2024-02-12 LAB
25(OH)D3 SERPL-MCNC: 30.3 NG/ML (ref 30–100)
ALBUMIN SERPL BCP-MCNC: 4.3 G/DL (ref 3.5–5)
ALP SERPL-CCNC: 84 U/L (ref 34–104)
ALT SERPL W P-5'-P-CCNC: 14 U/L (ref 7–52)
ANION GAP SERPL CALCULATED.3IONS-SCNC: 3 MMOL/L
AST SERPL W P-5'-P-CCNC: 12 U/L (ref 13–39)
BACTERIA UR QL AUTO: ABNORMAL /HPF
BASOPHILS # BLD AUTO: 0.04 THOUSANDS/ÂΜL (ref 0–0.1)
BASOPHILS NFR BLD AUTO: 1 % (ref 0–1)
BILIRUB SERPL-MCNC: 0.5 MG/DL (ref 0.2–1)
BILIRUB UR QL STRIP: NEGATIVE
BUN SERPL-MCNC: 27 MG/DL (ref 5–25)
CALCIUM SERPL-MCNC: 10.7 MG/DL (ref 8.4–10.2)
CHLORIDE SERPL-SCNC: 102 MMOL/L (ref 96–108)
CHOLEST SERPL-MCNC: 183 MG/DL
CLARITY UR: CLEAR
CO2 SERPL-SCNC: 35 MMOL/L (ref 21–32)
COLOR UR: ABNORMAL
CREAT SERPL-MCNC: 0.8 MG/DL (ref 0.6–1.3)
EOSINOPHIL # BLD AUTO: 0.2 THOUSAND/ÂΜL (ref 0–0.61)
EOSINOPHIL NFR BLD AUTO: 3 % (ref 0–6)
ERYTHROCYTE [DISTWIDTH] IN BLOOD BY AUTOMATED COUNT: 12.6 % (ref 11.6–15.1)
EST. AVERAGE GLUCOSE BLD GHB EST-MCNC: 123 MG/DL
GFR SERPL CREATININE-BSD FRML MDRD: 75 ML/MIN/1.73SQ M
GLUCOSE P FAST SERPL-MCNC: 95 MG/DL (ref 65–99)
GLUCOSE UR STRIP-MCNC: NEGATIVE MG/DL
HBA1C MFR BLD: 5.9 %
HCT VFR BLD AUTO: 45.5 % (ref 34.8–46.1)
HDLC SERPL-MCNC: 77 MG/DL
HGB BLD-MCNC: 14.6 G/DL (ref 11.5–15.4)
HGB UR QL STRIP.AUTO: NEGATIVE
IMM GRANULOCYTES # BLD AUTO: 0.02 THOUSAND/UL (ref 0–0.2)
IMM GRANULOCYTES NFR BLD AUTO: 0 % (ref 0–2)
KETONES UR STRIP-MCNC: NEGATIVE MG/DL
LDLC SERPL CALC-MCNC: 91 MG/DL (ref 0–100)
LEUKOCYTE ESTERASE UR QL STRIP: ABNORMAL
LYMPHOCYTES # BLD AUTO: 1.95 THOUSANDS/ÂΜL (ref 0.6–4.47)
LYMPHOCYTES NFR BLD AUTO: 25 % (ref 14–44)
MCH RBC QN AUTO: 29.3 PG (ref 26.8–34.3)
MCHC RBC AUTO-ENTMCNC: 32.1 G/DL (ref 31.4–37.4)
MCV RBC AUTO: 91 FL (ref 82–98)
MONOCYTES # BLD AUTO: 0.65 THOUSAND/ÂΜL (ref 0.17–1.22)
MONOCYTES NFR BLD AUTO: 8 % (ref 4–12)
MUCOUS THREADS UR QL AUTO: ABNORMAL
NEUTROPHILS # BLD AUTO: 5.04 THOUSANDS/ÂΜL (ref 1.85–7.62)
NEUTS SEG NFR BLD AUTO: 63 % (ref 43–75)
NITRITE UR QL STRIP: NEGATIVE
NON-SQ EPI CELLS URNS QL MICRO: ABNORMAL /HPF
NONHDLC SERPL-MCNC: 106 MG/DL
NRBC BLD AUTO-RTO: 0 /100 WBCS
PH UR STRIP.AUTO: 6.5 [PH]
PLATELET # BLD AUTO: 357 THOUSANDS/UL (ref 149–390)
PMV BLD AUTO: 9.5 FL (ref 8.9–12.7)
POTASSIUM SERPL-SCNC: 5 MMOL/L (ref 3.5–5.3)
PROT SERPL-MCNC: 7.4 G/DL (ref 6.4–8.4)
PROT UR STRIP-MCNC: ABNORMAL MG/DL
RBC # BLD AUTO: 4.99 MILLION/UL (ref 3.81–5.12)
RBC #/AREA URNS AUTO: ABNORMAL /HPF
SODIUM SERPL-SCNC: 140 MMOL/L (ref 135–147)
SP GR UR STRIP.AUTO: 1.02 (ref 1–1.03)
T4 FREE SERPL-MCNC: 1.45 NG/DL (ref 0.61–1.12)
TRIGL SERPL-MCNC: 75 MG/DL
TSH SERPL DL<=0.05 MIU/L-ACNC: 0.01 UIU/ML (ref 0.45–4.5)
UROBILINOGEN UR STRIP-ACNC: <2 MG/DL
WBC # BLD AUTO: 7.9 THOUSAND/UL (ref 4.31–10.16)
WBC #/AREA URNS AUTO: ABNORMAL /HPF

## 2024-02-12 PROCEDURE — 80053 COMPREHEN METABOLIC PANEL: CPT

## 2024-02-12 PROCEDURE — 82306 VITAMIN D 25 HYDROXY: CPT

## 2024-02-12 PROCEDURE — 83036 HEMOGLOBIN GLYCOSYLATED A1C: CPT

## 2024-02-12 PROCEDURE — 80061 LIPID PANEL: CPT

## 2024-02-12 PROCEDURE — 36415 COLL VENOUS BLD VENIPUNCTURE: CPT

## 2024-02-12 PROCEDURE — 84443 ASSAY THYROID STIM HORMONE: CPT

## 2024-02-12 PROCEDURE — 81001 URINALYSIS AUTO W/SCOPE: CPT

## 2024-02-12 PROCEDURE — 85025 COMPLETE CBC W/AUTO DIFF WBC: CPT

## 2024-02-12 PROCEDURE — 84439 ASSAY OF FREE THYROXINE: CPT

## 2024-02-15 ENCOUNTER — OFFICE VISIT (OUTPATIENT)
Dept: INTERNAL MEDICINE CLINIC | Facility: CLINIC | Age: 69
End: 2024-02-15
Payer: MEDICARE

## 2024-02-15 VITALS
HEART RATE: 84 BPM | HEIGHT: 59 IN | DIASTOLIC BLOOD PRESSURE: 58 MMHG | OXYGEN SATURATION: 96 % | SYSTOLIC BLOOD PRESSURE: 130 MMHG | WEIGHT: 226 LBS | BODY MASS INDEX: 45.56 KG/M2 | TEMPERATURE: 98.2 F

## 2024-02-15 DIAGNOSIS — E66.09 CLASS 2 OBESITY DUE TO EXCESS CALORIES WITHOUT SERIOUS COMORBIDITY WITH BODY MASS INDEX (BMI) OF 39.0 TO 39.9 IN ADULT: ICD-10-CM

## 2024-02-15 DIAGNOSIS — Z86.59 HISTORY OF EPISODE OF ANXIETY: ICD-10-CM

## 2024-02-15 DIAGNOSIS — D32.0 MENINGIOMA, CEREBRAL (HCC): ICD-10-CM

## 2024-02-15 DIAGNOSIS — Z00.00 MEDICARE ANNUAL WELLNESS VISIT, SUBSEQUENT: Primary | ICD-10-CM

## 2024-02-15 DIAGNOSIS — E78.00 PURE HYPERCHOLESTEROLEMIA: ICD-10-CM

## 2024-02-15 DIAGNOSIS — E03.9 ACQUIRED HYPOTHYROIDISM: ICD-10-CM

## 2024-02-15 DIAGNOSIS — F51.02 ADJUSTMENT INSOMNIA: ICD-10-CM

## 2024-02-15 DIAGNOSIS — I10 ESSENTIAL HYPERTENSION: ICD-10-CM

## 2024-02-15 PROCEDURE — G0439 PPPS, SUBSEQ VISIT: HCPCS | Performed by: STUDENT IN AN ORGANIZED HEALTH CARE EDUCATION/TRAINING PROGRAM

## 2024-02-15 PROCEDURE — 99214 OFFICE O/P EST MOD 30 MIN: CPT | Performed by: STUDENT IN AN ORGANIZED HEALTH CARE EDUCATION/TRAINING PROGRAM

## 2024-02-15 RX ORDER — LEVOTHYROXINE SODIUM 0.15 MG/1
150 TABLET ORAL
Qty: 90 TABLET | Refills: 3 | Status: SHIPPED | OUTPATIENT
Start: 2024-02-15

## 2024-02-15 RX ORDER — ALPRAZOLAM 0.25 MG/1
0.25 TABLET ORAL 2 TIMES DAILY PRN
Qty: 30 TABLET | Refills: 3 | Status: SHIPPED | OUTPATIENT
Start: 2024-02-15

## 2024-02-15 NOTE — ASSESSMENT & PLAN NOTE
Patient educated on the importance of weight loss and benefits of portion control and dieting.  Encouraged whole foods/Mediterranean diet, increasing consumption of vegetables, and avoiding processed/packaged foods/carbs.  Patient also educated on the importance of exercise. Encouraged to engage in moderate intensity exercise for 30-50 min at least 3-5 times per week.   Will consider referral to weight management in future.

## 2024-02-15 NOTE — ASSESSMENT & PLAN NOTE
Following with neurology. Complicated by seizure disorder. Managed on Keppra 250 mg BID.  Has MRI scheduled for March. Patient reports anxiety prior to MRI procedures in the past. Will prescribe episodic benzo.   Will continue to monitor.

## 2024-02-15 NOTE — ASSESSMENT & PLAN NOTE
Managed on Levothyroxine 175 mcg. Recently lowered from 200 mcg.   Will decrease to 150 mcg and retest in 6 weeks.

## 2024-02-15 NOTE — PROGRESS NOTES
Assessment and Plan:     Problem List Items Addressed This Visit          Endocrine    Hypothyroidism     Managed on Levothyroxine 175 mcg. Recently lowered from 200 mcg.   Will decrease to 150 mcg and retest in 6 weeks.          Relevant Medications    levothyroxine (Euthyrox) 150 mcg tablet    Other Relevant Orders    TSH + Free T4       Cardiovascular and Mediastinum    Essential hypertension     Controlled on Amlodipine and Diovan-HCT  Will continue to monitor             Nervous and Auditory    Meningioma, cerebral (HCC)     Following with neurology. Complicated by seizure disorder. Managed on Keppra 250 mg BID.  Has MRI scheduled for March. Patient reports anxiety prior to MRI procedures in the past. Will prescribe episodic benzo.   Will continue to monitor.             Other    Hyperlipemia     Managed on Crestor, most recent lipid panel wnl   Will continue to monitor         Class 2 obesity due to excess calories in adult     Patient educated on the importance of weight loss and benefits of portion control and dieting.  Encouraged whole foods/Mediterranean diet, increasing consumption of vegetables, and avoiding processed/packaged foods/carbs.  Patient also educated on the importance of exercise. Encouraged to engage in moderate intensity exercise for 30-50 min at least 3-5 times per week.   Will consider referral to weight management in future.         Relevant Orders    Ambulatory Referral to Weight Management    Medicare annual wellness visit, subsequent - Primary     -Discussed vaccinations Prevnar 20, Shingrix - will have completed at pharmacy   -CRC screening: No personal or family history of colon cancer or colon polyps  Last colonoscopy was in 2021, recommendation for 5 year follow up for diverticula.  -Cervical cancer screening: not indicated  -BrCa screening: There is no personal or family history of breast cancer. She does not report finding new breast lumps, breast pain or nipple discharge.Last  Mammo was in February 2024, repeat screening in one year.   -Screening for osteoporosis: DXA scan declined; on vitamin d supplementation.   -No personal or family history of skin cancers or melanoma.  -Sexual health screening: Patient is low risk for STDs   -Advised patient about the importance of diet and exercise   -Advised of the importance of dental hygiene and routine dental and eye visits.  -Smoking history: remote smoking history               Other Visit Diagnoses       Adjustment insomnia        Relevant Medications    ALPRAZolam (XANAX) 0.25 mg tablet    History of episode of anxiety        Relevant Medications    ALPRAZolam (XANAX) 0.25 mg tablet             Preventive health issues were discussed with patient, and age appropriate screening tests were ordered as noted in patient's After Visit Summary.  Personalized health advice and appropriate referrals for health education or preventive services given if needed, as noted in patient's After Visit Summary.     History of Present Illness:     Patient presents for a Medicare Wellness Visit    HPI   This is a very pleasant 69 y.o. female who presents to the clinic for management of their chronic medical conditions.  Patient's medical conditions are stable unless noted otherwise above.  Patient has not had any recent hospitalizations, or medical emergencies since last visit.  Patient has no further complaints other than what is mentioned in the ROS.    Patient Care Team:  Raghav Nix DO as PCP - General     Review of Systems:     Review of Systems   Constitutional:  Negative for chills and fever.   HENT:  Negative for congestion, ear pain, rhinorrhea and sinus pain.    Eyes:  Negative for visual disturbance.   Respiratory:  Negative for chest tightness, shortness of breath and wheezing.    Cardiovascular:  Negative for chest pain and palpitations.   Gastrointestinal:  Negative for abdominal pain, constipation, diarrhea and vomiting.   Endocrine: Negative  for polyuria.   Genitourinary:  Negative for dysuria.   Musculoskeletal:  Negative for arthralgias and myalgias.   Neurological:  Negative for dizziness, syncope and light-headedness.        Problem List:     Patient Active Problem List   Diagnosis    Essential hypertension    Hyperglycemia    Hyperlipemia    Hypothyroidism    Class 2 obesity due to excess calories in adult    Acute bacterial conjunctivitis of right eye    Vitamin D deficiency    Meningioma, cerebral (HCC)    Seizure disorder (HCC)    Medicare annual wellness visit, subsequent    Contusion of right chest wall    Acute pain of right knee    Anxiety    Knee arthropathy    Fungal dermatitis      Past Medical and Surgical History:     Past Medical History:   Diagnosis Date    Arthritis 2008    Disease of thyroid gland 1976    Hypertension 2004     Past Surgical History:   Procedure Laterality Date    BRAIN SURGERY  2019    Benign lt. Parietal Meningioma      Family History:     Family History   Problem Relation Age of Onset    Heart disease Father     Hypertension Mother     Diabetes Mother         Latent    Arthritis Mother     Stroke Paternal Grandmother       Social History:     Social History     Socioeconomic History    Marital status: Single     Spouse name: None    Number of children: None    Years of education: None    Highest education level: None   Occupational History    None   Tobacco Use    Smoking status: Former     Current packs/day: 0.00     Average packs/day: 1 pack/day for 25.0 years (25.0 ttl pk-yrs)     Types: Cigarettes     Start date: 1982     Quit date: 2007     Years since quittin.2    Smokeless tobacco: Never    Tobacco comments:     Quit off & on up until . Haven't smoked since   Vaping Use    Vaping status: Never Used   Substance and Sexual Activity    Alcohol use: No    Drug use: Never    Sexual activity: Not Currently     Partners: Male     Birth control/protection: None   Other Topics Concern     None   Social History Narrative    None     Social Determinants of Health     Financial Resource Strain: Low Risk  (2/15/2024)    Overall Financial Resource Strain (CARDIA)     Difficulty of Paying Living Expenses: Not very hard   Food Insecurity: Not on file   Transportation Needs: No Transportation Needs (2/15/2024)    PRAPARE - Transportation     Lack of Transportation (Medical): No     Lack of Transportation (Non-Medical): No   Physical Activity: Not on file   Stress: Not on file   Social Connections: Not on file   Intimate Partner Violence: Not on file   Housing Stability: Not on file      Medications and Allergies:     Current Outpatient Medications   Medication Sig Dispense Refill    ALPRAZolam (XANAX) 0.25 mg tablet Take 1 tablet (0.25 mg total) by mouth 2 (two) times a day as needed for anxiety or sleep 30 tablet 3    amLODIPine (NORVASC) 10 mg tablet TAKE 1 TABLET BY MOUTH EVERY DAY 90 tablet 3    aspirin (ECOTRIN LOW STRENGTH) 81 mg EC tablet Take 81 mg by mouth      famotidine (PEPCID) 20 mg tablet TAKE 1 TABLET BY MOUTH TWICE A  tablet 1    levETIRAcetam (KEPPRA) 250 mg tablet TK 1 T PO BID      levothyroxine (Euthyrox) 150 mcg tablet Take 1 tablet (150 mcg total) by mouth daily in the early morning 90 tablet 3    Multiple Vitamin (MULTIVITAMIN ADULT PO) Take 1 tablet by mouth every other day      naproxen (NAPROSYN) 500 mg tablet TAKE 1 TABLET TWICE A DAY BY ORAL ROUTE AS NEEDED.      rosuvastatin (CRESTOR) 5 mg tablet TAKE 1 TABLET (5 MG TOTAL) BY MOUTH DAILY. 90 tablet 0    traMADol (ULTRAM) 50 mg tablet Take 50 mg by mouth every 6 (six) hours as needed      valsartan-hydrochlorothiazide (DIOVAN-HCT) 320-12.5 MG per tablet TAKE 1 TABLET BY MOUTH EVERY DAY 90 tablet 2    Vitamin D, Ergocalciferol, 50 MCG (2000 UT) CAPS Take by mouth      Diclofenac Sodium (VOLTAREN) 1 % APPLY 2 GRAMS TO THE AFFECTED AREA(S) BY TOPICAL ROUTE 4 TIMES PER DAY      loratadine (CLARITIN) 10 mg tablet Take 10 mg by  mouth daily      nystatin (MYCOSTATIN) powder Apply topically 3 (three) times a day 30 g 3     No current facility-administered medications for this visit.     Allergies   Allergen Reactions    Medical Tape Irritability     Paper tape was used.       Immunizations:     Immunization History   Administered Date(s) Administered    COVID-19 MODERNA VACC 0.5 ML IM 01/07/2021, 02/05/2021, 11/02/2021    INFLUENZA 11/25/2011    Influenza, high dose seasonal 0.7 mL 10/03/2023    Influenza, injectable, quadrivalent, preservative free 0.5 mL 09/19/2020    Td (adult), Unspecified 01/01/2007      Health Maintenance:         Topic Date Due    Hepatitis C Screening  Never done    Breast Cancer Screening: Mammogram  02/06/2025    Colorectal Cancer Screening  11/01/2026    Lung Cancer Screening  Discontinued         Topic Date Due    Pneumococcal Vaccine: 65+ Years (1 of 1 - PCV) Never done    COVID-19 Vaccine (4 - 2023-24 season) 09/01/2023      Medicare Screening Tests and Risk Assessments:     Opal is here for her Subsequent Wellness visit.     Health Risk Assessment:   Patient rates overall health as good. Patient feels that their physical health rating is same. Patient is satisfied with their life. Eyesight was rated as same. Hearing was rated as same. Patient feels that their emotional and mental health rating is slightly better. Patients states they are sometimes angry. Patient states they are sometimes unusually tired/fatigued. Pain experienced in the last 7 days has been some. Patient's pain rating has been 8/10. Patient states that she has experienced no weight loss or gain in last 6 months.     Depression Screening:   PHQ-2 Score: 0      Fall Risk Screening:   In the past year, patient has experienced: no history of falling in past year      Urinary Incontinence Screening:   Patient has leaked urine accidently in the last six months.     Home Safety:  Patient has trouble with stairs inside or outside of their home.  Patient has working smoke alarms and has working carbon monoxide detector. Home safety hazards include: none.     Nutrition:   Current diet is Regular.     Medications:   Patient is currently taking over-the-counter supplements. OTC medications include: see medication list. Patient is able to manage medications.     Activities of Daily Living (ADLs)/Instrumental Activities of Daily Living (IADLs):   Walk and transfer into and out of bed and chair?: Yes  Dress and groom yourself?: Yes    Bathe or shower yourself?: Yes    Feed yourself? Yes  Do your laundry/housekeeping?: Yes  Manage your money, pay your bills and track your expenses?: Yes  Make your own meals?: Yes    Do your own shopping?: Yes    Previous Hospitalizations:   Any hospitalizations or ED visits within the last 12 months?: No      Advance Care Planning:   Living will: No    Durable POA for healthcare: No    Advanced directive: Yes      PREVENTIVE SCREENINGS      Cardiovascular Screening:    General: Screening Not Indicated and History Lipid Disorder      Diabetes Screening:     General: Screening Current      Colorectal Cancer Screening:     General: Screening Current      Breast Cancer Screening:     General: Screening Current      Cervical Cancer Screening:    General: Screening Not Indicated      Osteoporosis Screening:    General: Screening Not Indicated and History Osteoporosis      Lung Cancer Screening:     General: Screening Not Indicated    Screening, Brief Intervention, and Referral to Treatment (SBIRT)    Screening  Typical number of drinks in a day: 0  Typical number of drinks in a week: 0  Interpretation: Low risk drinking behavior.    AUDIT-C Screenin) How often did you have a drink containing alcohol in the past year? monthly or less  2) How many drinks did you have on a typical day when you were drinking in the past year? 1 to 2  3) How often did you have 6 or more drinks on one occasion in the past year? never    AUDIT-C Score:  "1  Interpretation: Score 0-2 (female): Negative screen for alcohol misuse    Single Item Drug Screening:  How often have you used an illegal drug (including marijuana) or a prescription medication for non-medical reasons in the past year? never    Single Item Drug Screen Score: 0  Interpretation: Negative screen for possible drug use disorder    Review of Current Opioid Use    Opioid Risk Tool (ORT) Interpretation: Complete Opioid Risk Tool (ORT)    No results found.     Physical Exam:     /58 (BP Location: Left arm, Patient Position: Sitting, Cuff Size: Standard)   Pulse 84   Temp 98.2 °F (36.8 °C)   Ht 4' 11\" (1.499 m)   Wt 103 kg (226 lb)   SpO2 96%   BMI 45.65 kg/m²     Physical Exam  Vitals and nursing note reviewed.   Constitutional:       General: She is not in acute distress.     Appearance: She is well-developed.   HENT:      Head: Normocephalic and atraumatic.   Eyes:      Conjunctiva/sclera: Conjunctivae normal.   Cardiovascular:      Rate and Rhythm: Normal rate and regular rhythm.      Heart sounds: Normal heart sounds. No murmur heard.  Pulmonary:      Effort: Pulmonary effort is normal. No respiratory distress.      Breath sounds: Normal breath sounds.   Abdominal:      Palpations: Abdomen is soft.      Tenderness: There is no abdominal tenderness.   Musculoskeletal:         General: No swelling.      Cervical back: Neck supple.   Skin:     General: Skin is warm and dry.      Capillary Refill: Capillary refill takes less than 2 seconds.   Neurological:      Mental Status: She is alert.   Psychiatric:         Mood and Affect: Mood normal.          Chanell Melgar MD  "

## 2024-02-15 NOTE — ASSESSMENT & PLAN NOTE
-Discussed vaccinations Prevnar 20, Shingrix - will have completed at pharmacy   -CRC screening: No personal or family history of colon cancer or colon polyps  Last colonoscopy was in 2021, recommendation for 5 year follow up for diverticula.  -Cervical cancer screening: not indicated  -BrCa screening: There is no personal or family history of breast cancer. She does not report finding new breast lumps, breast pain or nipple discharge.Last Mammo was in February 2024, repeat screening in one year.   -Screening for osteoporosis: DXA scan declined; on vitamin d supplementation.   -No personal or family history of skin cancers or melanoma.  -Sexual health screening: Patient is low risk for STDs   -Advised patient about the importance of diet and exercise   -Advised of the importance of dental hygiene and routine dental and eye visits.  -Smoking history: remote smoking history

## 2024-02-21 PROBLEM — Z00.00 MEDICARE ANNUAL WELLNESS VISIT, SUBSEQUENT: Status: RESOLVED | Noted: 2020-10-16 | Resolved: 2024-02-21

## 2024-02-21 PROBLEM — H10.31 ACUTE BACTERIAL CONJUNCTIVITIS OF RIGHT EYE: Status: RESOLVED | Noted: 2019-07-15 | Resolved: 2024-02-21

## 2024-03-01 DIAGNOSIS — I10 ESSENTIAL HYPERTENSION: ICD-10-CM

## 2024-03-01 RX ORDER — VALSARTAN AND HYDROCHLOROTHIAZIDE 320; 12.5 MG/1; MG/1
TABLET, FILM COATED ORAL
Qty: 90 TABLET | Refills: 3 | Status: SHIPPED | OUTPATIENT
Start: 2024-03-01

## 2024-03-04 DIAGNOSIS — K29.40 ATROPHIC GASTRITIS WITHOUT HEMORRHAGE: ICD-10-CM

## 2024-03-04 RX ORDER — FAMOTIDINE 20 MG/1
TABLET, FILM COATED ORAL
Qty: 180 TABLET | Refills: 1 | Status: SHIPPED | OUTPATIENT
Start: 2024-03-04

## 2024-03-27 DIAGNOSIS — I10 ESSENTIAL HYPERTENSION: ICD-10-CM

## 2024-03-27 RX ORDER — VALSARTAN AND HYDROCHLOROTHIAZIDE 320; 12.5 MG/1; MG/1
TABLET, FILM COATED ORAL
Qty: 90 TABLET | Refills: 4 | Status: SHIPPED | OUTPATIENT
Start: 2024-03-27

## 2024-04-08 ENCOUNTER — TRANSCRIBE ORDERS (OUTPATIENT)
Dept: LAB | Facility: CLINIC | Age: 69
End: 2024-04-08

## 2024-04-08 ENCOUNTER — APPOINTMENT (OUTPATIENT)
Dept: LAB | Facility: CLINIC | Age: 69
End: 2024-04-08
Payer: MEDICARE

## 2024-04-08 DIAGNOSIS — E03.9 ACQUIRED HYPOTHYROIDISM: ICD-10-CM

## 2024-04-08 DIAGNOSIS — E03.9 ACQUIRED HYPOTHYROIDISM: Primary | ICD-10-CM

## 2024-04-08 LAB
T4 FREE SERPL-MCNC: 1.18 NG/DL (ref 0.61–1.12)
TSH SERPL DL<=0.05 MIU/L-ACNC: 0.07 UIU/ML (ref 0.45–4.5)

## 2024-04-08 PROCEDURE — 84439 ASSAY OF FREE THYROXINE: CPT

## 2024-04-08 PROCEDURE — 84443 ASSAY THYROID STIM HORMONE: CPT

## 2024-04-08 PROCEDURE — 36415 COLL VENOUS BLD VENIPUNCTURE: CPT

## 2024-04-09 ENCOUNTER — OFFICE VISIT (OUTPATIENT)
Dept: BARIATRICS | Facility: CLINIC | Age: 69
End: 2024-04-09
Payer: MEDICARE

## 2024-04-09 VITALS
HEART RATE: 88 BPM | DIASTOLIC BLOOD PRESSURE: 70 MMHG | WEIGHT: 229.8 LBS | TEMPERATURE: 99.4 F | BODY MASS INDEX: 45.12 KG/M2 | RESPIRATION RATE: 18 BRPM | HEIGHT: 60 IN | SYSTOLIC BLOOD PRESSURE: 138 MMHG

## 2024-04-09 DIAGNOSIS — K21.9 GASTROESOPHAGEAL REFLUX DISEASE WITHOUT ESOPHAGITIS: ICD-10-CM

## 2024-04-09 DIAGNOSIS — G40.909 SEIZURE DISORDER (HCC): ICD-10-CM

## 2024-04-09 DIAGNOSIS — E03.9 ACQUIRED HYPOTHYROIDISM: ICD-10-CM

## 2024-04-09 DIAGNOSIS — E78.00 PURE HYPERCHOLESTEROLEMIA: ICD-10-CM

## 2024-04-09 DIAGNOSIS — E66.01 OBESITY, CLASS III, BMI 40-49.9 (MORBID OBESITY) (HCC): Primary | ICD-10-CM

## 2024-04-09 DIAGNOSIS — R73.03 PREDIABETES: ICD-10-CM

## 2024-04-09 DIAGNOSIS — I10 ESSENTIAL HYPERTENSION: ICD-10-CM

## 2024-04-09 PROBLEM — E66.813 OBESITY, CLASS III, BMI 40-49.9 (MORBID OBESITY): Status: ACTIVE | Noted: 2024-04-09

## 2024-04-09 PROCEDURE — 99204 OFFICE O/P NEW MOD 45 MIN: CPT | Performed by: INTERNAL MEDICINE

## 2024-04-09 RX ORDER — DEXAMETHASONE 1 MG
TABLET ORAL
Qty: 1 TABLET | Refills: 0 | Status: SHIPPED | OUTPATIENT
Start: 2024-04-09

## 2024-04-09 NOTE — ASSESSMENT & PLAN NOTE
Patient is on valsartan, hydrochlorothiazide, and amlodipine to control her blood pressure.  Systolic blood pressure is just under 140 at today's appointment.

## 2024-04-09 NOTE — PROGRESS NOTES
Assessment/Plan:  Miley was seen today for consult.    Diagnoses and all orders for this visit:    Obesity, Class III, BMI 40-49.9 (morbid obesity) (HCC)  -     Ambulatory Referral to Weight Management  -     dexamethasone (DECADRON) 1 mg tablet; Take one tablet between 22:00 and 24:00 (10pm and midnight)  -     Cortisol Level, AM Specimen; Future    Prediabetes    Essential hypertension    Acquired hypothyroidism    Pure hypercholesterolemia    Gastroesophageal reflux disease without esophagitis    Seizure disorder (HCC)       Seizure disorder (HCC)  No buproprion or contrave.  Caution with topiramate.    Essential hypertension  Patient is on valsartan, hydrochlorothiazide, and amlodipine to control her blood pressure.  Systolic blood pressure is just under 140 at today's appointment.    Hyperlipemia  She is on Crestor 5 mg daily    Gastroesophageal reflux disease without esophagitis  Primary care doctor has her on famotidine 20 mg daily to control reflux.     - Discussed options of HealthyCORE-Intensive Lifestyle Intervention Program, Very Low Calorie Diet-VLCD, Conservative Program, Cameron-En-Y Gastric Bypass, and Vertical Sleeve Gastrectomy and the role of weight loss medications.  - Explained the importance of making lifestyle changes first before starting anti-obesity medications.  - Patient should demonstrate lifestyle changes first before anti-obesity medication initiated.   - Patient is interested in pursuing HealthyCORE-Intensive Lifestyle Intervention Program and Conservative Program  - Initial weight loss goal of 5-10% weight loss for improved health as studies have shown this is where we see the greatest impact on improving health and decreasing risk of obesity related conditions.  - Weight loss can improve patient's co-morbid conditions and/or prevent weight-related complications.  - Labs reviewed: As below.      General Recommendations:  Nutrition:  Eat breakfast daily.  Do not skip meals.     Food log  (ie.) www.myfitnesspal.com, sparkpeople.com, loseit.com, calorieking.com, etc.    Practice mindful eating.  Be sure to set aside time to eat, eat slowly, and savor your food.    Hydration:    At least 64oz of water daily.  No sugar sweetened beverages.  No juice (eat the fruit instead).    Exercise:  Studies have shown that the ideal exercise goal is somewhere between 150 to 300 minutes of moderate intensity exercise a week.  Start with exercising 10 minutes every other day and gradually increase physical activity with a goal of at least 150 minutes of moderate intensity exercise a week, divided over at least 3 days a week.  An example of this would be exercising 30 minutes a day, 5 days a week.  Resistance training can increase muscle mass and increase our resting metabolic rate.   FULL BODY resistance training is recommended 2-3 times a week.  Do not do this on consecutive days to allow for muscle recovery.    Aim for a bare minimum 5000 steps, even on days you do not exercise.    Monitoring:   Weigh yourself daily.  If this causes undue stress, then just weigh yourself once a week.  Weigh yourself the same time of the day with the same amount of clothing on.  Preferably this should be done after waking up, before you eat, and with no clothing or minimal clothing on.    Specific Goals:  Low dose dexamethasone suppression test:  Take dexamethasone 1mg at 10pm-midnight and have a cortisol level checked between 7-9am    Calorie goal:  1200 calories a day (Provided with meal plan to follow).    Food log (ie.) www.myfitnesspal.com,sparkpeople.com,loseit.com,calorieking.com,etc.     No sugary beverages. At least 64oz of water daily.    I recommend meeting with a dietician or enrolling in the [facility] program    Return visit:  3-4 months    Total time spent reviewing chart, interviewing patient, examining patient, discussing plan, answering all questions, and documentin min.        ______________________________________________________________________      Subjective:   Chief Complaint   Patient presents with    Consult     AXV-Ijdnbzo-AR-150lb;Waist-55.5in       HPI: Opal Arechiga  is a 69 y.o. female with history of prediabetes, hypertension, hyperlipidemia, GERD, seizures, and excess weight, here to pursue weight loss management.  Previous notes and records have been reviewed.    HPI  Wt Readings from Last 20 Encounters:   04/09/24 104 kg (229 lb 12.8 oz)   02/15/24 103 kg (226 lb)   10/03/23 98.4 kg (217 lb)   02/13/23 101 kg (222 lb 6.4 oz)   09/16/22 94.6 kg (208 lb 9.6 oz)   05/16/22 93.9 kg (207 lb)   01/24/22 92.1 kg (203 lb)   07/19/21 91.2 kg (201 lb)   01/18/21 105 kg (232 lb)   10/16/20 104 kg (229 lb)   02/17/20 102 kg (224 lb)   11/18/19 99.9 kg (220 lb 3.2 oz)   07/26/19 106 kg (234 lb)   01/25/19 103 kg (227 lb 6.4 oz)   06/28/18 102 kg (224 lb)   12/28/17 101 kg (222 lb)   10/26/17 99.8 kg (220 lb)   12/15/16 113 kg (249 lb 6 oz)   08/23/16 114 kg (251 lb 6 oz)   05/27/16 110 kg (243 lb)     Excess Weight:  Onset:  Late 40s  Highest weight: 251 (2016)  Lowest Weight: 229  Current weight: 229  What has been tried: Diet and Exercise  From 251 to 212 with partial meal replacement.  Contributing factors:    Meals are healthy but large  Chips, pretzels, crackers, dips  Associated symptoms and effects: comorbid conditions    Hunger/Cravings: Chips, pretzels, crackers  Dining out:  once a week  Hydration:  Water 30 oz    Ice tea (artifical sweetener)    [place] milk with cereal or overnight oats  Alcohol: No   Smoking:  No  Exercise:  Chair exercises every day.  Weight Monitoring:  No  Sleep:  7-8  Occupation:  Retired CT scan tech    Past Medical History:   Diagnosis Date    Arthritis 2008    Disease of thyroid gland 1976    Hypertension 2004     Patient denies personal and family history of pancreatitis, thyroid cancer, MEN-2 tumors.  Denies any hx of glaucoma, kidney stones,  "gallstones.  + seizures  Denies Hx of CAD, PAD, palpitations, arrhythmia.   Denies uncontrolled anxiety or depression, suicidal behavior or thinking , insomnia or sleep disturbance.     Past Surgical History:   Procedure Laterality Date    BRAIN SURGERY  11/8/2019    Benign lt. Parietal Meningioma     The following portions of the patient's history were reviewed and updated as appropriate: allergies, current medications, past family history, past medical history, past social history, past surgical history, and problem list.    Review Of Systems:  Review of Systems   Constitutional:  Negative for activity change, appetite change, fatigue and fever.   Respiratory:  Negative for cough and shortness of breath.    Cardiovascular:  Negative for chest pain, palpitations and leg swelling.   Gastrointestinal:  Negative for abdominal pain, constipation, diarrhea, nausea and vomiting.   Endocrine: Negative for cold intolerance and heat intolerance.   Genitourinary:  Negative for difficulty urinating and dysuria.   Musculoskeletal:  Negative for arthralgias, back pain and gait problem.   Skin:  Negative for pallor and rash.   Neurological:  Negative for headaches.   Psychiatric/Behavioral:  Negative for dysphoric mood, sleep disturbance and suicidal ideas (or HI). The patient is not nervous/anxious.      Objective:  /70   Pulse 88   Temp 99.4 °F (37.4 °C)   Resp 18   Ht 4' 11.5\" (1.511 m)   Wt 104 kg (229 lb 12.8 oz)   BMI 45.64 kg/m²   Physical Exam  Vitals and nursing note reviewed.   Constitutional:       General: She is not in acute distress.     Appearance: Normal appearance. She is not ill-appearing or diaphoretic.   Eyes:      General: No scleral icterus.  Cardiovascular:      Rate and Rhythm: Normal rate and regular rhythm.      Pulses: Normal pulses.      Heart sounds: No murmur heard.  Pulmonary:      Effort: Pulmonary effort is normal. No respiratory distress.      Breath sounds: Normal breath sounds. No " wheezing or rhonchi.   Abdominal:      General: Bowel sounds are normal. There is no distension.      Palpations: Abdomen is soft. There is no mass.      Tenderness: There is no abdominal tenderness.   Musculoskeletal:      Cervical back: Neck supple.      Right lower leg: No edema.      Left lower leg: No edema.   Lymphadenopathy:      Cervical: No cervical adenopathy.   Skin:     Capillary Refill: Capillary refill takes less than 2 seconds.      Findings: No lesion or rash.   Neurological:      Mental Status: She is alert and oriented to person, place, and time.      Gait: Gait normal.   Psychiatric:         Mood and Affect: Mood normal.         Behavior: Behavior normal.         Labs and Imaging  Recent labs and imaging have been personally reviewed.  Lab Results   Component Value Date    WBC 7.90 02/12/2024    HGB 14.6 02/12/2024    HCT 45.5 02/12/2024    MCV 91 02/12/2024     02/12/2024     Lab Results   Component Value Date     08/20/2016    SODIUM 140 02/12/2024    K 5.0 02/12/2024     02/12/2024    CO2 35 (H) 02/12/2024    AGAP 3 02/12/2024    BUN 27 (H) 02/12/2024    CREATININE 0.80 02/12/2024    GLUC 102 (H) 09/30/2023    GLUF 95 02/12/2024    CALCIUM 10.7 (H) 02/12/2024    AST 12 (L) 02/12/2024    ALT 14 02/12/2024    ALKPHOS 84 02/12/2024    PROT 7.1 08/20/2016    TP 7.4 02/12/2024    BILITOT 0.4 08/20/2016    TBILI 0.50 02/12/2024    EGFR 75 02/12/2024     Lab Results   Component Value Date    HGBA1C 5.9 (H) 02/12/2024     Lab Results   Component Value Date    UAE3ESZLLRRC 0.067 (L) 04/08/2024    TSH 0.02 (L) 09/30/2023     Lab Results   Component Value Date    CHOLESTEROL 183 02/12/2024     Lab Results   Component Value Date    HDL 77 02/12/2024     Lab Results   Component Value Date    TRIG 75 02/12/2024     Lab Results   Component Value Date    LDLCALC 91 02/12/2024

## 2024-04-09 NOTE — PATIENT INSTRUCTIONS
- Discussed options of HealthyCORE-Intensive Lifestyle Intervention Program, Very Low Calorie Diet-VLCD, Conservative Program, Cameron-En-Y Gastric Bypass, and Vertical Sleeve Gastrectomy and the role of weight loss medications.  - Explained the importance of making lifestyle changes first before starting anti-obesity medications.  - Patient should demonstrate lifestyle changes first before anti-obesity medication initiated.   - Patient is interested in pursuing HealthyCORE-Intensive Lifestyle Intervention Program and Conservative Program  - Initial weight loss goal of 5-10% weight loss for improved health as studies have shown this is where we see the greatest impact on improving health and decreasing risk of obesity related conditions.  - Weight loss can improve patient's co-morbid conditions and/or prevent weight-related complications.  - Labs reviewed: As below.      General Recommendations:  Nutrition:  Eat breakfast daily.  Do not skip meals.     Food log (ie.) www.[employer].com, sparkpeople.com, loseit.com, calorieking.com, etc.    Practice mindful eating.  Be sure to set aside time to eat, eat slowly, and savor your food.    Hydration:    At least 64oz of water daily.  No sugar sweetened beverages.  No juice (eat the fruit instead).    Exercise:  Studies have shown that the ideal exercise goal is somewhere between 150 to 300 minutes of moderate intensity exercise a week.  Start with exercising 10 minutes every other day and gradually increase physical activity with a goal of at least 150 minutes of moderate intensity exercise a week, divided over at least 3 days a week.  An example of this would be exercising 30 minutes a day, 5 days a week.  Resistance training can increase muscle mass and increase our resting metabolic rate.   FULL BODY resistance training is recommended 2-3 times a week.  Do not do this on consecutive days to allow for muscle recovery.    Aim for a bare minimum 5000 steps, even on days  you do not exercise.    Monitoring:   Weigh yourself daily.  If this causes undue stress, then just weigh yourself once a week.  Weigh yourself the same time of the day with the same amount of clothing on.  Preferably this should be done after waking up, before you eat, and with no clothing or minimal clothing on.    Specific Goals:  Low dose dexamethasone suppression test:  Take dexamethasone 1mg at 10pm-midnight and have a cortisol level checked between 7-9am    Calorie goal:  1200 calories a day (Provided with meal plan to follow).    Food log (ie.) www.[employer].com,sparkpeople.com,Poolamiit.com,[employer].com,etc.     No sugary beverages. At least 64oz of water daily.    I recommend meeting with a dietician or enrolling in the [facility] program    Return visit:  3-4 months

## 2024-04-10 ENCOUNTER — APPOINTMENT (OUTPATIENT)
Dept: LAB | Facility: CLINIC | Age: 69
End: 2024-04-10
Payer: MEDICARE

## 2024-04-10 DIAGNOSIS — E03.9 ACQUIRED HYPOTHYROIDISM: ICD-10-CM

## 2024-04-10 DIAGNOSIS — E66.01 OBESITY, CLASS III, BMI 40-49.9 (MORBID OBESITY) (HCC): ICD-10-CM

## 2024-04-10 LAB — CORTIS AM PEAK SERPL-MCNC: 0.7 UG/DL (ref 6.7–22.6)

## 2024-04-10 PROCEDURE — 36415 COLL VENOUS BLD VENIPUNCTURE: CPT

## 2024-04-10 PROCEDURE — 82533 TOTAL CORTISOL: CPT

## 2024-04-10 RX ORDER — LEVOTHYROXINE SODIUM 0.03 MG/1
25 TABLET ORAL
Qty: 90 TABLET | Refills: 3 | Status: SHIPPED | OUTPATIENT
Start: 2024-04-10 | End: 2024-04-11

## 2024-04-11 ENCOUNTER — TELEPHONE (OUTPATIENT)
Dept: BARIATRICS | Facility: CLINIC | Age: 69
End: 2024-04-11

## 2024-04-11 DIAGNOSIS — E03.9 ACQUIRED HYPOTHYROIDISM: ICD-10-CM

## 2024-04-11 RX ORDER — LEVOTHYROXINE SODIUM 0.15 MG/1
150 TABLET ORAL
Qty: 90 TABLET | Refills: 3 | Status: SHIPPED | OUTPATIENT
Start: 2024-04-11

## 2024-04-15 ENCOUNTER — DOCUMENTATION (OUTPATIENT)
Dept: INTERNAL MEDICINE CLINIC | Facility: CLINIC | Age: 69
End: 2024-04-15

## 2024-04-15 DIAGNOSIS — E03.9 ACQUIRED HYPOTHYROIDISM: Primary | ICD-10-CM

## 2024-04-15 RX ORDER — LEVOTHYROXINE SODIUM 0.03 MG/1
25 TABLET ORAL DAILY
COMMUNITY
End: 2024-04-15 | Stop reason: SDUPTHER

## 2024-04-15 RX ORDER — LEVOTHYROXINE SODIUM 0.03 MG/1
25 TABLET ORAL DAILY
Qty: 30 TABLET | Refills: 0 | Status: SHIPPED | OUTPATIENT
Start: 2024-04-15 | End: 2024-04-25

## 2024-04-25 DIAGNOSIS — E03.9 ACQUIRED HYPOTHYROIDISM: Primary | ICD-10-CM

## 2024-04-25 RX ORDER — LEVOTHYROXINE SODIUM 175 UG/1
175 TABLET ORAL DAILY
COMMUNITY
End: 2024-04-25 | Stop reason: SDUPTHER

## 2024-04-25 RX ORDER — LEVOTHYROXINE SODIUM 175 UG/1
175 TABLET ORAL DAILY
Qty: 90 TABLET | Refills: 3 | Status: SHIPPED | OUTPATIENT
Start: 2024-04-25

## 2024-05-29 ENCOUNTER — OFFICE VISIT (OUTPATIENT)
Dept: INTERNAL MEDICINE CLINIC | Facility: CLINIC | Age: 69
End: 2024-05-29
Payer: MEDICARE

## 2024-05-29 VITALS
TEMPERATURE: 97.9 F | DIASTOLIC BLOOD PRESSURE: 80 MMHG | OXYGEN SATURATION: 97 % | RESPIRATION RATE: 20 BRPM | WEIGHT: 234 LBS | HEIGHT: 59 IN | HEART RATE: 86 BPM | SYSTOLIC BLOOD PRESSURE: 142 MMHG | BODY MASS INDEX: 47.17 KG/M2

## 2024-05-29 DIAGNOSIS — R52 BODY ACHES: ICD-10-CM

## 2024-05-29 DIAGNOSIS — R53.83 OTHER FATIGUE: Primary | ICD-10-CM

## 2024-05-29 LAB
SARS-COV-2 AG UPPER RESP QL IA: NEGATIVE
SL AMB POCT RAPID FLU A: NORMAL
SL AMB POCT RAPID FLU B: NORMAL
VALID CONTROL: NORMAL

## 2024-05-29 PROCEDURE — 87811 SARS-COV-2 COVID19 W/OPTIC: CPT | Performed by: STUDENT IN AN ORGANIZED HEALTH CARE EDUCATION/TRAINING PROGRAM

## 2024-05-29 PROCEDURE — 99213 OFFICE O/P EST LOW 20 MIN: CPT | Performed by: STUDENT IN AN ORGANIZED HEALTH CARE EDUCATION/TRAINING PROGRAM

## 2024-05-29 PROCEDURE — G2211 COMPLEX E/M VISIT ADD ON: HCPCS | Performed by: STUDENT IN AN ORGANIZED HEALTH CARE EDUCATION/TRAINING PROGRAM

## 2024-05-29 PROCEDURE — 87804 INFLUENZA ASSAY W/OPTIC: CPT | Performed by: STUDENT IN AN ORGANIZED HEALTH CARE EDUCATION/TRAINING PROGRAM

## 2024-05-29 NOTE — PROGRESS NOTES
Ambulatory Visit  Name: Opal Arechiga      : 1955      MRN: [id number]  Encounter Provider: Chanell Melgar MD  Encounter Date: 2024   Encounter department: [facility] INTER[facility] MEDICINE    Assessment & Plan   1. Other fatigue  Assessment & Plan:  With associated body aches.   POCT FLU/COVID negative    Orders:  -     Comprehensive metabolic panel; Future  -     CBC and differential; Future  -     Sedimentation rate, automated; Future  -     C-reactive protein; Future  2. Body aches  -     POCT Rapid Covid Ag  -     POCT rapid flu A and B  -     Sedimentation rate, automated; Future  -     C-reactive protein; Future         History of Present Illness     HPI  Patient reports fatigue for the past week with associated body aches and headaches. Symptoms are intermittent. Patient denies URI symptoms, fever, chills. Denies n/v/d. Reports chronic constipation. Is drinking adequate amounts of fluid. Reports aching muscles in shoulders and proximal LE.     Review of Systems   Constitutional:  Negative for chills and fever.   HENT:  Negative for congestion, ear pain, rhinorrhea and sinus pain.    Eyes:  Negative for visual disturbance.   Respiratory:  Negative for chest tightness, shortness of breath and wheezing.    Cardiovascular:  Negative for chest pain and palpitations.   Gastrointestinal:  Negative for abdominal pain, constipation, diarrhea and vomiting.   Endocrine: Negative for polyuria.   Genitourinary:  Negative for dysuria.   Musculoskeletal:  Positive for myalgias (as per hpi).   Neurological:  Negative for dizziness, syncope and light-headedness.     Past Medical History:   Diagnosis Date    Arthritis 2008    Disease of thyroid gland 1976    Hypertension 2004     Past Surgical History:   Procedure Laterality Date    BRAIN SURGERY  2019    Benign lt. Parietal Meningioma     Family History   Problem Relation Age of Onset    Heart disease Father     Hypertension Mother     Diabetes Mother          Latent    Arthritis Mother     Stroke Paternal Grandmother      Social History     Tobacco Use    Smoking status: Former     Current packs/day: 0.00     Average packs/day: 1 pack/day for 25.0 years (25.0 ttl pk-yrs)     Types: Cigarettes     Start date: 1982     Quit date: 2007     Years since quittin.5    Smokeless tobacco: Never    Tobacco comments:     Quit off & on up until . Haven't smoked since   Vaping Use    Vaping status: Never Used   Substance and Sexual Activity    Alcohol use: No    Drug use: Never    Sexual activity: Not Currently     Partners: Male     Birth control/protection: None     Current Outpatient Medications on File Prior to Visit   Medication Sig    amLODIPine (NORVASC) 10 mg tablet TAKE 1 TABLET BY MOUTH EVERY DAY    aspirin (ECOTRIN LOW STRENGTH) 81 mg EC tablet Take 81 mg by mouth    famotidine (PEPCID) 20 mg tablet TAKE 1 TABLET BY MOUTH TWICE A DAY    levETIRAcetam (KEPPRA) 250 mg tablet TK 1 T PO BID    levothyroxine 175 mcg tablet Take 1 tablet (175 mcg total) by mouth daily    loratadine (CLARITIN) 10 mg tablet Take 10 mg by mouth daily    naproxen (NAPROSYN) 500 mg tablet TAKE 1 TABLET TWICE A DAY BY ORAL ROUTE AS NEEDED.    rosuvastatin (CRESTOR) 5 mg tablet TAKE 1 TABLET (5 MG TOTAL) BY MOUTH DAILY.    traMADol (ULTRAM) 50 mg tablet Take 50 mg by mouth every 6 (six) hours as needed    valsartan-hydrochlorothiazide (DIOVAN-HCT) 320-12.5 MG per tablet TAKE 1 TABLET BY MOUTH EVERY DAY    Vitamin D, Ergocalciferol, 50 MCG (2000 UT) CAPS Take by mouth    dexamethasone (DECADRON) 1 mg tablet Take one tablet between 22:00 and 24:00 (10pm and midnight)    Multiple Vitamin (MULTIVITAMIN ADULT PO) Take 1 tablet by mouth every other day     Allergies   Allergen Reactions    Medical Tape Irritability     Paper tape was used.      Immunization History   Administered Date(s) Administered    COVID-19 MODERNA VACC 0.5 ML IM 2021, 2021, 2021     "INFLUENZA 11/25/2011    Influenza, high dose seasonal 0.7 mL 10/03/2023    Influenza, injectable, quadrivalent, preservative free 0.5 mL 09/19/2020    Td (adult), Unspecified 01/01/2007     Objective     /80 (BP Location: Left arm, Patient Position: Sitting, Cuff Size: Large)   Pulse 86   Temp 97.9 °F (36.6 °C) (Tympanic Core)   Resp 20   Ht 4' 11\" (1.499 m)   Wt 106 kg (234 lb)   SpO2 97%   BMI 47.26 kg/m²     Physical Exam  Vitals and nursing note reviewed.   Constitutional:       General: She is not in acute distress.     Appearance: She is well-developed.   HENT:      Head: Normocephalic and atraumatic.   Eyes:      Conjunctiva/sclera: Conjunctivae normal.   Cardiovascular:      Rate and Rhythm: Normal rate and regular rhythm.      Heart sounds: Normal heart sounds. No murmur heard.  Pulmonary:      Effort: Pulmonary effort is normal. No respiratory distress.      Breath sounds: Normal breath sounds.   Abdominal:      Palpations: Abdomen is soft.      Tenderness: There is no abdominal tenderness.   Musculoskeletal:         General: No swelling.      Cervical back: Neck supple.   Skin:     General: Skin is warm and dry.      Capillary Refill: Capillary refill takes less than 2 seconds.   Neurological:      Mental Status: She is alert and oriented to person, place, and time.   Psychiatric:         Mood and Affect: Mood normal.       Administrative Statements     "

## 2024-05-30 ENCOUNTER — APPOINTMENT (OUTPATIENT)
Dept: LAB | Facility: CLINIC | Age: 69
End: 2024-05-30
Payer: MEDICARE

## 2024-05-30 DIAGNOSIS — R53.83 OTHER FATIGUE: ICD-10-CM

## 2024-05-30 DIAGNOSIS — E03.9 ACQUIRED HYPOTHYROIDISM: ICD-10-CM

## 2024-05-30 DIAGNOSIS — R52 BODY ACHES: ICD-10-CM

## 2024-05-30 LAB
ALBUMIN SERPL BCP-MCNC: 3.9 G/DL (ref 3.5–5)
ALP SERPL-CCNC: 78 U/L (ref 34–104)
ALT SERPL W P-5'-P-CCNC: 11 U/L (ref 7–52)
ANION GAP SERPL CALCULATED.3IONS-SCNC: 5 MMOL/L (ref 4–13)
AST SERPL W P-5'-P-CCNC: 11 U/L (ref 13–39)
BASOPHILS # BLD AUTO: 0.04 THOUSANDS/ÂΜL (ref 0–0.1)
BASOPHILS NFR BLD AUTO: 1 % (ref 0–1)
BILIRUB SERPL-MCNC: 0.26 MG/DL (ref 0.2–1)
BUN SERPL-MCNC: 34 MG/DL (ref 5–25)
CALCIUM SERPL-MCNC: 10.2 MG/DL (ref 8.4–10.2)
CHLORIDE SERPL-SCNC: 104 MMOL/L (ref 96–108)
CO2 SERPL-SCNC: 32 MMOL/L (ref 21–32)
CREAT SERPL-MCNC: 1.01 MG/DL (ref 0.6–1.3)
CRP SERPL QL: 2.6 MG/L
EOSINOPHIL # BLD AUTO: 0.15 THOUSAND/ÂΜL (ref 0–0.61)
EOSINOPHIL NFR BLD AUTO: 2 % (ref 0–6)
ERYTHROCYTE [DISTWIDTH] IN BLOOD BY AUTOMATED COUNT: 13 % (ref 11.6–15.1)
ERYTHROCYTE [SEDIMENTATION RATE] IN BLOOD: 19 MM/HOUR (ref 0–29)
GFR SERPL CREATININE-BSD FRML MDRD: 57 ML/MIN/1.73SQ M
GLUCOSE P FAST SERPL-MCNC: 101 MG/DL (ref 65–99)
HCT VFR BLD AUTO: 41.7 % (ref 34.8–46.1)
HGB BLD-MCNC: 12.9 G/DL (ref 11.5–15.4)
IMM GRANULOCYTES # BLD AUTO: 0.02 THOUSAND/UL (ref 0–0.2)
IMM GRANULOCYTES NFR BLD AUTO: 0 % (ref 0–2)
LYMPHOCYTES # BLD AUTO: 1.76 THOUSANDS/ÂΜL (ref 0.6–4.47)
LYMPHOCYTES NFR BLD AUTO: 27 % (ref 14–44)
MCH RBC QN AUTO: 28.5 PG (ref 26.8–34.3)
MCHC RBC AUTO-ENTMCNC: 30.9 G/DL (ref 31.4–37.4)
MCV RBC AUTO: 92 FL (ref 82–98)
MONOCYTES # BLD AUTO: 0.72 THOUSAND/ÂΜL (ref 0.17–1.22)
MONOCYTES NFR BLD AUTO: 11 % (ref 4–12)
NEUTROPHILS # BLD AUTO: 3.95 THOUSANDS/ÂΜL (ref 1.85–7.62)
NEUTS SEG NFR BLD AUTO: 59 % (ref 43–75)
NRBC BLD AUTO-RTO: 0 /100 WBCS
PLATELET # BLD AUTO: 304 THOUSANDS/UL (ref 149–390)
PMV BLD AUTO: 9.6 FL (ref 8.9–12.7)
POTASSIUM SERPL-SCNC: 4.9 MMOL/L (ref 3.5–5.3)
PROT SERPL-MCNC: 6.5 G/DL (ref 6.4–8.4)
RBC # BLD AUTO: 4.52 MILLION/UL (ref 3.81–5.12)
SODIUM SERPL-SCNC: 141 MMOL/L (ref 135–147)
T4 SERPL-MCNC: 14.38 UG/DL (ref 6.09–12.23)
TSH SERPL DL<=0.05 MIU/L-ACNC: 0.01 UIU/ML (ref 0.45–4.5)
WBC # BLD AUTO: 6.64 THOUSAND/UL (ref 4.31–10.16)

## 2024-05-30 PROCEDURE — 85025 COMPLETE CBC W/AUTO DIFF WBC: CPT

## 2024-05-30 PROCEDURE — 36415 COLL VENOUS BLD VENIPUNCTURE: CPT

## 2024-05-30 PROCEDURE — 84436 ASSAY OF TOTAL THYROXINE: CPT

## 2024-05-30 PROCEDURE — 84443 ASSAY THYROID STIM HORMONE: CPT

## 2024-05-30 PROCEDURE — 85652 RBC SED RATE AUTOMATED: CPT

## 2024-05-30 PROCEDURE — 86140 C-REACTIVE PROTEIN: CPT

## 2024-05-30 PROCEDURE — 80053 COMPREHEN METABOLIC PANEL: CPT

## 2024-05-30 RX ORDER — LEVOTHYROXINE SODIUM 0.1 MG/1
100 TABLET ORAL DAILY
Qty: 90 TABLET | Refills: 1 | Status: SHIPPED | OUTPATIENT
Start: 2024-05-30

## 2024-06-29 DIAGNOSIS — E78.00 PURE HYPERCHOLESTEROLEMIA: ICD-10-CM

## 2024-06-29 RX ORDER — ROSUVASTATIN CALCIUM 5 MG/1
5 TABLET, COATED ORAL DAILY
Qty: 90 TABLET | Refills: 1 | Status: SHIPPED | OUTPATIENT
Start: 2024-06-29

## 2024-07-10 ENCOUNTER — APPOINTMENT (OUTPATIENT)
Dept: LAB | Facility: CLINIC | Age: 69
End: 2024-07-10
Payer: MEDICARE

## 2024-07-10 DIAGNOSIS — E03.9 ACQUIRED HYPOTHYROIDISM: ICD-10-CM

## 2024-07-10 LAB — TSH SERPL DL<=0.05 MIU/L-ACNC: 1.66 UIU/ML (ref 0.45–4.5)

## 2024-07-10 PROCEDURE — 36415 COLL VENOUS BLD VENIPUNCTURE: CPT

## 2024-07-10 PROCEDURE — 84443 ASSAY THYROID STIM HORMONE: CPT

## 2024-07-12 ENCOUNTER — RA CDI HCC (OUTPATIENT)
Dept: OTHER | Facility: HOSPITAL | Age: 69
End: 2024-07-12

## 2024-08-07 ENCOUNTER — OFFICE VISIT (OUTPATIENT)
Dept: INTERNAL MEDICINE CLINIC | Facility: CLINIC | Age: 69
End: 2024-08-07
Payer: MEDICARE

## 2024-08-07 VITALS
WEIGHT: 243 LBS | SYSTOLIC BLOOD PRESSURE: 130 MMHG | DIASTOLIC BLOOD PRESSURE: 68 MMHG | HEART RATE: 84 BPM | OXYGEN SATURATION: 98 % | BODY MASS INDEX: 48.99 KG/M2 | HEIGHT: 59 IN | RESPIRATION RATE: 18 BRPM | TEMPERATURE: 97.4 F

## 2024-08-07 DIAGNOSIS — Z00.00 HEALTHCARE MAINTENANCE: Primary | ICD-10-CM

## 2024-08-07 DIAGNOSIS — E66.09 CLASS 2 OBESITY DUE TO EXCESS CALORIES IN ADULT, UNSPECIFIED BMI, UNSPECIFIED WHETHER SERIOUS COMORBIDITY PRESENT: ICD-10-CM

## 2024-08-07 DIAGNOSIS — E78.00 PURE HYPERCHOLESTEROLEMIA: ICD-10-CM

## 2024-08-07 DIAGNOSIS — R73.9 HYPERGLYCEMIA: ICD-10-CM

## 2024-08-07 DIAGNOSIS — E03.9 ACQUIRED HYPOTHYROIDISM: ICD-10-CM

## 2024-08-07 DIAGNOSIS — G40.909 SEIZURE DISORDER (HCC): ICD-10-CM

## 2024-08-07 DIAGNOSIS — I10 ESSENTIAL HYPERTENSION: ICD-10-CM

## 2024-08-07 PROCEDURE — 99214 OFFICE O/P EST MOD 30 MIN: CPT | Performed by: INTERNAL MEDICINE

## 2024-08-07 PROCEDURE — G2211 COMPLEX E/M VISIT ADD ON: HCPCS | Performed by: INTERNAL MEDICINE

## 2024-08-07 NOTE — ASSESSMENT & PLAN NOTE
Has a history of elevated blood sugar readings but not overt diabetes.  Continues to struggle with her weight and is being seen by weight management and discussion about possible medical treatment versus surgery.  Will check a fasting blood sugar hemoglobin A1c with her next visit

## 2024-08-07 NOTE — ASSESSMENT & PLAN NOTE
With her BMI she is considered obese and she continues to have an struggle with her weight.  Has been seen by weight management and was unable to lose weight with diet.  Would be a candidate for either medical treatment or surgical treatment.  Has an appointment in the near future to discuss modalities to help her with weight loss.

## 2024-08-07 NOTE — ASSESSMENT & PLAN NOTE
No recurrence of seizure activity.  She remains on Keppra from her neurologist.  No breakthrough seizure activity.

## 2024-08-07 NOTE — PROGRESS NOTES
Ambulatory Visit  Name: Opal Arechiga      : 1955      MRN: [id number]  Encounter Provider: Raghav Nix DO  Encounter Date: 2024   Encounter department: [facility] INTERNAL MEDICINE    Assessment & Plan   1. Healthcare maintenance  -     Comprehensive metabolic panel; Future; Expected date: 2024  -     CBC and differential; Future; Expected date: 2024  -     Lipid panel; Future; Expected date: 2024  -     Urinalysis with microscopic; Future  -     Hemoglobin A1C; Future  -     Thyroid Panel With TSH; Future  2. Essential hypertension  Assessment & Plan:  History of hypertension.  She remains on medication as previously including amlodipine and valsartan with hydrochlorothiazide.  Blood pressure is stable.  Will continue present medication and continue also to monitor renal function.  3. Hyperglycemia  Assessment & Plan:  Has a history of elevated blood sugar readings but not overt diabetes.  Continues to struggle with her weight and is being seen by weight management and discussion about possible medical treatment versus surgery.  Will check a fasting blood sugar hemoglobin A1c with her next visit  Orders:  -     Hemoglobin A1C; Future  4. Pure hypercholesterolemia  Assessment & Plan:  History of hyperlipidemia.  Patient remains on Crestor at 5 mg daily.  She will continue present medication and again we discussed the importance of diet, watching her intake of fat and cholesterol with her diet.  Check a lipid profile with her next visit and make adjustment medication if needed.  5. Acquired hypothyroidism  Assessment & Plan:  History of hypothyroidism.  Since she was last seen she has had some adjustment and modification of treatment for her hypothyroidism and finally with recent lab test show that she is under control.  She will continue present medication and thyroid function will be checked with her next visit.  When she is at a low she is extremely  symptomatic  Orders:  -     Thyroid Panel With TSH; Future  6. Seizure disorder (HCC)  Assessment & Plan:  No recurrence of seizure activity.  She remains on Keppra from her neurologist.  No breakthrough seizure activity.  7. Class 2 obesity due to excess calories in adult, unspecified BMI, unspecified whether serious comorbidity present  Assessment & Plan:  With her BMI she is considered obese and she continues to have an struggle with her weight.  Has been seen by weight management and was unable to lose weight with diet.  Would be a candidate for either medical treatment or surgical treatment.  Has an appointment in the near future to discuss modalities to help her with weight loss.         History of Present Illness     Patient is a 69-year-old female history of multiple medical problems outlined previously who is here today for routine follow-up.  Since her last visit patient is now being seen by weight management and they are trying to consider which best modality would help this patient with weight loss.  Whether medical or surgical.  She had some adjustments with her thyroid hormone but is now under control and she states that she may be going for a total knee replacement on the left in October or November      Review of Systems   Constitutional: Negative.    HENT: Negative.     Eyes: Negative.    Respiratory: Negative.     Cardiovascular: Negative.    Gastrointestinal: Negative.    Endocrine: Negative.    Genitourinary: Negative.    Musculoskeletal: Negative.    Skin: Negative.    Allergic/Immunologic: Negative.    Neurological: Negative.    Hematological: Negative.    Psychiatric/Behavioral: Negative.       Past Medical History:   Diagnosis Date    Arthritis 2008    Disease of thyroid gland 1976    Hypertension 2004     Past Surgical History:   Procedure Laterality Date    BRAIN SURGERY  11/8/2019    Benign lt. Parietal Meningioma     Family History   Problem Relation Age of Onset    Heart disease Father      Hypertension Mother     Diabetes Mother         Latent    Arthritis Mother     Stroke Paternal Grandmother      Social History     Tobacco Use    Smoking status: Former     Current packs/day: 0.00     Average packs/day: 1 pack/day for 25.0 years (25.0 ttl pk-yrs)     Types: Cigarettes     Start date: 1982     Quit date: 2007     Years since quittin.7    Smokeless tobacco: Never    Tobacco comments:     Quit off & on up until . Haven't smoked since   Vaping Use    Vaping status: Never Used   Substance and Sexual Activity    Alcohol use: No    Drug use: Never    Sexual activity: Not Currently     Partners: Male     Birth control/protection: None     Current Outpatient Medications on File Prior to Visit   Medication Sig    amLODIPine (NORVASC) 10 mg tablet TAKE 1 TABLET BY MOUTH EVERY DAY    aspirin (ECOTRIN LOW STRENGTH) 81 mg EC tablet Take 81 mg by mouth    famotidine (PEPCID) 20 mg tablet TAKE 1 TABLET BY MOUTH TWICE A DAY    levETIRAcetam (KEPPRA) 250 mg tablet TK 1 T PO BID    levothyroxine 100 mcg tablet Take 1 tablet (100 mcg total) by mouth daily    loratadine (CLARITIN) 10 mg tablet Take 10 mg by mouth daily    naproxen (NAPROSYN) 500 mg tablet TAKE 1 TABLET TWICE A DAY BY ORAL ROUTE AS NEEDED.    rosuvastatin (CRESTOR) 5 mg tablet TAKE 1 TABLET (5 MG TOTAL) BY MOUTH DAILY.    traMADol (ULTRAM) 50 mg tablet Take 50 mg by mouth every 6 (six) hours as needed    valsartan-hydrochlorothiazide (DIOVAN-HCT) 320-12.5 MG per tablet TAKE 1 TABLET BY MOUTH EVERY DAY    Vitamin D, Ergocalciferol, 50 MCG (2000 UT) CAPS Take by mouth    dexamethasone (DECADRON) 1 mg tablet Take one tablet between 22:00 and 24:00 (10pm and midnight)    Multiple Vitamin (MULTIVITAMIN ADULT PO) Take 1 tablet by mouth every other day     Allergies   Allergen Reactions    Medical Tape Irritability     Paper tape was used.      Immunization History   Administered Date(s) Administered    COVID-19 MODERNA VACC 0.5 ML IM  "01/07/2021, 02/05/2021, 11/02/2021    INFLUENZA 11/25/2011    Influenza, high dose seasonal 0.7 mL 10/03/2023    Influenza, injectable, quadrivalent, preservative free 0.5 mL 09/19/2020    Td (adult), Unspecified 01/01/2007     Objective     /68   Pulse 84   Temp (!) 97.4 °F (36.3 °C)   Resp 18   Ht 4' 11\" (1.499 m)   Wt 110 kg (243 lb)   SpO2 98%   BMI 49.08 kg/m²     Physical Exam  Vitals and nursing note reviewed.   Constitutional:       General: She is not in acute distress.     Appearance: Normal appearance. She is obese. She is not ill-appearing, toxic-appearing or diaphoretic.      Comments: Obese 69-year-old female who is awake alert in no acute distress oriented x 3, walking with a cane   HENT:      Head: Normocephalic and atraumatic.      Right Ear: Tympanic membrane, ear canal and external ear normal. There is no impacted cerumen.      Left Ear: Tympanic membrane, ear canal and external ear normal. There is no impacted cerumen.      Nose: Nose normal. No congestion or rhinorrhea.      Mouth/Throat:      Mouth: Mucous membranes are moist.      Pharynx: Oropharynx is clear. No oropharyngeal exudate or posterior oropharyngeal erythema.   Eyes:      General: No scleral icterus.        Right eye: No discharge.         Left eye: No discharge.      Extraocular Movements: Extraocular movements intact.      Conjunctiva/sclera: Conjunctivae normal.      Pupils: Pupils are equal, round, and reactive to light.   Neck:      Vascular: No carotid bruit.   Cardiovascular:      Rate and Rhythm: Normal rate and regular rhythm.      Pulses: Normal pulses.      Heart sounds: Normal heart sounds. No murmur heard.     No friction rub. No gallop.   Pulmonary:      Effort: Pulmonary effort is normal. No respiratory distress.      Breath sounds: Normal breath sounds. No stridor. No wheezing, rhonchi or rales.   Chest:      Chest wall: No tenderness.   Abdominal:      General: Abdomen is flat. Bowel sounds are normal. " There is no distension.      Palpations: Abdomen is soft. There is no mass.      Tenderness: There is no abdominal tenderness. There is no right CVA tenderness, left CVA tenderness, guarding or rebound.      Hernia: No hernia is present.   Musculoskeletal:         General: Swelling and deformity present. No tenderness or signs of injury.      Cervical back: Normal range of motion and neck supple. No rigidity or tenderness.      Right lower leg: Edema present.      Left lower leg: Edema present.      Comments: Patient has trace to +1 edema bilateral lower extremities which she states is worse but many of the day.  Status post total knee replacement on the right, degenerative therapeutic changes on the left knee.   Lymphadenopathy:      Cervical: No cervical adenopathy.   Skin:     General: Skin is warm and dry.      Coloration: Skin is not jaundiced or pale.      Findings: No bruising, erythema, lesion or rash.   Neurological:      General: No focal deficit present.      Mental Status: She is alert and oriented to person, place, and time. Mental status is at baseline.      Cranial Nerves: No cranial nerve deficit.      Sensory: No sensory deficit.      Motor: No weakness.      Coordination: Coordination normal.      Gait: Gait abnormal.      Deep Tendon Reflexes: Reflexes abnormal.      Comments: Antalgic gait favoring her left knee secondary to degenerative arthritis.   Psychiatric:         Mood and Affect: Mood normal.         Behavior: Behavior normal.         Thought Content: Thought content normal.         Judgment: Judgment normal.

## 2024-08-07 NOTE — ASSESSMENT & PLAN NOTE
History of hyperlipidemia.  Patient remains on Crestor at 5 mg daily.  She will continue present medication and again we discussed the importance of diet, watching her intake of fat and cholesterol with her diet.  Check a lipid profile with her next visit and make adjustment medication if needed.

## 2024-08-07 NOTE — ASSESSMENT & PLAN NOTE
History of hypertension.  She remains on medication as previously including amlodipine and valsartan with hydrochlorothiazide.  Blood pressure is stable.  Will continue present medication and continue also to monitor renal function.

## 2024-08-07 NOTE — ASSESSMENT & PLAN NOTE
History of hypothyroidism.  Since she was last seen she has had some adjustment and modification of treatment for her hypothyroidism and finally with recent lab test show that she is under control.  She will continue present medication and thyroid function will be checked with her next visit.  When she is at a low she is extremely symptomatic

## 2024-08-14 ENCOUNTER — TELEPHONE (OUTPATIENT)
Age: 69
End: 2024-08-14

## 2024-08-14 NOTE — TELEPHONE ENCOUNTER
Patient would like a Pre-op Clearance appoinment w/ Dr. Nix:    [facility]    total knee replacement  10/11  Needs ekg   dr. chacon     Offered 10/08 to close to surgery date. Please reach out to patient to schedule. Does not want another provider.

## 2024-08-15 ENCOUNTER — TELEPHONE (OUTPATIENT)
Age: 69
End: 2024-08-15

## 2024-08-15 NOTE — TELEPHONE ENCOUNTER
Patient called in regards to fax sent for pre op.  Not in media but informed may take few days for processing.

## 2024-09-02 DIAGNOSIS — K29.40 ATROPHIC GASTRITIS WITHOUT HEMORRHAGE: ICD-10-CM

## 2024-09-02 DIAGNOSIS — I10 ESSENTIAL HYPERTENSION: ICD-10-CM

## 2024-09-02 RX ORDER — FAMOTIDINE 20 MG/1
TABLET, FILM COATED ORAL
Qty: 180 TABLET | Refills: 1 | Status: SHIPPED | OUTPATIENT
Start: 2024-09-02

## 2024-09-02 RX ORDER — AMLODIPINE BESYLATE 10 MG/1
10 TABLET ORAL DAILY
Qty: 90 TABLET | Refills: 3 | Status: SHIPPED | OUTPATIENT
Start: 2024-09-02

## 2024-09-06 PROBLEM — Z00.00 HEALTHCARE MAINTENANCE: Status: RESOLVED | Noted: 2020-10-16 | Resolved: 2024-09-06

## 2024-09-12 ENCOUNTER — TELEPHONE (OUTPATIENT)
Age: 69
End: 2024-09-12

## 2024-09-12 NOTE — TELEPHONE ENCOUNTER
Patient would like her orthopedic office to fax over her chest xray report and have Dr. Nix review it to make sure she doesn't need anything else for her upcoming appointment with him.  Please advise.

## 2024-09-19 ENCOUNTER — APPOINTMENT (OUTPATIENT)
Dept: LAB | Facility: CLINIC | Age: 69
End: 2024-09-19
Payer: MEDICARE

## 2024-09-19 ENCOUNTER — TRANSCRIBE ORDERS (OUTPATIENT)
Dept: LAB | Facility: CLINIC | Age: 69
End: 2024-09-19

## 2024-09-19 DIAGNOSIS — Z01.812 PRE-PROCEDURAL LABORATORY EXAMINATION: ICD-10-CM

## 2024-09-19 DIAGNOSIS — Z79.01 LONG TERM (CURRENT) USE OF ANTICOAGULANTS: Primary | ICD-10-CM

## 2024-09-19 DIAGNOSIS — Z79.01 LONG TERM (CURRENT) USE OF ANTICOAGULANTS: ICD-10-CM

## 2024-09-19 LAB
ALBUMIN SERPL BCG-MCNC: 4.1 G/DL (ref 3.5–5)
ALP SERPL-CCNC: 64 U/L (ref 34–104)
ALT SERPL W P-5'-P-CCNC: 13 U/L (ref 7–52)
ANION GAP SERPL CALCULATED.3IONS-SCNC: 7 MMOL/L (ref 4–13)
AST SERPL W P-5'-P-CCNC: 13 U/L (ref 13–39)
BACTERIA UR QL AUTO: ABNORMAL /HPF
BASOPHILS # BLD AUTO: 0.02 THOUSANDS/ΜL (ref 0–0.1)
BASOPHILS NFR BLD AUTO: 0 % (ref 0–1)
BILIRUB SERPL-MCNC: 0.28 MG/DL (ref 0.2–1)
BILIRUB UR QL STRIP: NEGATIVE
BUN SERPL-MCNC: 24 MG/DL (ref 5–25)
CALCIUM SERPL-MCNC: 10.2 MG/DL (ref 8.4–10.2)
CHLORIDE SERPL-SCNC: 99 MMOL/L (ref 96–108)
CLARITY UR: CLEAR
CO2 SERPL-SCNC: 34 MMOL/L (ref 21–32)
COLOR UR: ABNORMAL
CREAT SERPL-MCNC: 0.97 MG/DL (ref 0.6–1.3)
EOSINOPHIL # BLD AUTO: 0.11 THOUSAND/ΜL (ref 0–0.61)
EOSINOPHIL NFR BLD AUTO: 2 % (ref 0–6)
ERYTHROCYTE [DISTWIDTH] IN BLOOD BY AUTOMATED COUNT: 13.3 % (ref 11.6–15.1)
GFR SERPL CREATININE-BSD FRML MDRD: 60 ML/MIN/1.73SQ M
GLUCOSE P FAST SERPL-MCNC: 89 MG/DL (ref 65–99)
GLUCOSE UR STRIP-MCNC: NEGATIVE MG/DL
HCT VFR BLD AUTO: 41.6 % (ref 34.8–46.1)
HGB BLD-MCNC: 12.7 G/DL (ref 11.5–15.4)
HGB UR QL STRIP.AUTO: NEGATIVE
IMM GRANULOCYTES # BLD AUTO: 0.01 THOUSAND/UL (ref 0–0.2)
IMM GRANULOCYTES NFR BLD AUTO: 0 % (ref 0–2)
INR PPP: 0.93 (ref 0.85–1.19)
KETONES UR STRIP-MCNC: NEGATIVE MG/DL
LEUKOCYTE ESTERASE UR QL STRIP: ABNORMAL
LYMPHOCYTES # BLD AUTO: 2.23 THOUSANDS/ΜL (ref 0.6–4.47)
LYMPHOCYTES NFR BLD AUTO: 41 % (ref 14–44)
MCH RBC QN AUTO: 28.2 PG (ref 26.8–34.3)
MCHC RBC AUTO-ENTMCNC: 30.5 G/DL (ref 31.4–37.4)
MCV RBC AUTO: 92 FL (ref 82–98)
MONOCYTES # BLD AUTO: 0.57 THOUSAND/ΜL (ref 0.17–1.22)
MONOCYTES NFR BLD AUTO: 10 % (ref 4–12)
NEUTROPHILS # BLD AUTO: 2.55 THOUSANDS/ΜL (ref 1.85–7.62)
NEUTS SEG NFR BLD AUTO: 47 % (ref 43–75)
NITRITE UR QL STRIP: NEGATIVE
NON-SQ EPI CELLS URNS QL MICRO: ABNORMAL /HPF
NRBC BLD AUTO-RTO: 0 /100 WBCS
PH UR STRIP.AUTO: 6.5 [PH]
PLATELET # BLD AUTO: 267 THOUSANDS/UL (ref 149–390)
PMV BLD AUTO: 9.3 FL (ref 8.9–12.7)
POTASSIUM SERPL-SCNC: 4.8 MMOL/L (ref 3.5–5.3)
PROT SERPL-MCNC: 6.5 G/DL (ref 6.4–8.4)
PROT UR STRIP-MCNC: ABNORMAL MG/DL
PROTHROMBIN TIME: 12.8 SECONDS (ref 12.3–15)
RBC # BLD AUTO: 4.5 MILLION/UL (ref 3.81–5.12)
RBC #/AREA URNS AUTO: ABNORMAL /HPF
SODIUM SERPL-SCNC: 140 MMOL/L (ref 135–147)
SP GR UR STRIP.AUTO: 1.03 (ref 1–1.03)
UROBILINOGEN UR STRIP-ACNC: <2 MG/DL
WBC # BLD AUTO: 5.49 THOUSAND/UL (ref 4.31–10.16)
WBC #/AREA URNS AUTO: ABNORMAL /HPF

## 2024-09-19 PROCEDURE — 81001 URINALYSIS AUTO W/SCOPE: CPT

## 2024-09-19 PROCEDURE — 85610 PROTHROMBIN TIME: CPT

## 2024-09-19 PROCEDURE — 80053 COMPREHEN METABOLIC PANEL: CPT

## 2024-09-19 PROCEDURE — 36415 COLL VENOUS BLD VENIPUNCTURE: CPT

## 2024-09-19 PROCEDURE — 85025 COMPLETE CBC W/AUTO DIFF WBC: CPT

## 2024-09-24 ENCOUNTER — CONSULT (OUTPATIENT)
Age: 69
End: 2024-09-24
Payer: MEDICARE

## 2024-09-24 VITALS
DIASTOLIC BLOOD PRESSURE: 68 MMHG | WEIGHT: 242 LBS | BODY MASS INDEX: 48.79 KG/M2 | OXYGEN SATURATION: 94 % | SYSTOLIC BLOOD PRESSURE: 130 MMHG | HEIGHT: 59 IN | TEMPERATURE: 98.6 F | HEART RATE: 101 BPM

## 2024-09-24 DIAGNOSIS — E03.9 ACQUIRED HYPOTHYROIDISM: ICD-10-CM

## 2024-09-24 DIAGNOSIS — R73.9 HYPERGLYCEMIA: ICD-10-CM

## 2024-09-24 DIAGNOSIS — Z01.818 PRE-OP EXAMINATION: ICD-10-CM

## 2024-09-24 DIAGNOSIS — I71.21 ANEURYSM OF ASCENDING AORTA WITHOUT RUPTURE (HCC): Primary | ICD-10-CM

## 2024-09-24 DIAGNOSIS — M17.12 PRIMARY OSTEOARTHRITIS OF LEFT KNEE: ICD-10-CM

## 2024-09-24 DIAGNOSIS — E66.01 OBESITY, CLASS III, BMI 40-49.9 (MORBID OBESITY) (HCC): ICD-10-CM

## 2024-09-24 PROCEDURE — 93000 ELECTROCARDIOGRAM COMPLETE: CPT | Performed by: INTERNAL MEDICINE

## 2024-09-24 PROCEDURE — 99215 OFFICE O/P EST HI 40 MIN: CPT | Performed by: INTERNAL MEDICINE

## 2024-09-24 NOTE — ASSESSMENT & PLAN NOTE
Continues to have difficulties with losing weight.  We placed a consult in the past for her to be seen by weight management and she did have an appointment for follow-up but apparently her previous physician has moved and she does not have an appointment to be seen and evaluated till late in December.  She states that she tries to watch her caloric intake is much as possible but has not been successful.

## 2024-09-24 NOTE — PROGRESS NOTES
Ambulatory Visit  Name: Opal Arechiga      : 1955      MRN: [id number]  Encounter Provider: Raghav Nix DO  Encounter Date: 2024   Encounter department: [facility] INTERNAL MEDICINE    Assessment & Plan  Pre-op examination    Orders:    POCT ECG    Aneurysm of ascending aorta without rupture (HCC)  Chest x-ray reports this is stable.  Will go for echocardiogram for further evaluation    Orders:    Echo complete w/ contrast if indicated; Future    Primary osteoarthritis of left knee  Patient has a history of diffuse arthritis.  History in the past of total knee replacement on the right and now is here today for preop evaluation prior to having surgery, total knee replacement on the left.  Patient did have preadmission testing and we did discuss the results and performed an EKG today in the office showing some minor abnormalities but nothing that would preclude her undergoing the procedure as planned.  She did have a recent chest x-ray which shows some stable aneurysm of the ascending aorta which has not been changed from previous studies.  She did undergo examination in the office and no contraindications for her to undergo surgery as planned.  She will stop the aspirin 5 to 7 days prior to procedure and other nonsteroidal anti-inflammatories.  Also was told to stop multivitamins again 7 days prior to the procedure.         Obesity, Class III, BMI 40-49.9 (morbid obesity) (HCC)  Continues to have difficulties with losing weight.  We placed a consult in the past for her to be seen by weight management and she did have an appointment for follow-up but apparently her previous physician has moved and she does not have an appointment to be seen and evaluated till late in December.  She states that she tries to watch her caloric intake is much as possible but has not been successful.         Acquired hypothyroidism  Remains on thyroid hormone as previously.  Thyroid studies have been normal.   Patient will continue present medication and surveillance.         Hyperglycemia  Hyperglycemia but not overt diabetes.  Continue to follow-up with weight management and we will continue to monitor her sugar readings              History of Present Illness     Patient is a 69-year-old female history of multiple medical problems who is here today for preop evaluation prior to undergoing total knee replacement on the left.  Patient has been having problems with pain degenerative arthritis and difficulty with ambulation walking with a cane today.  Patient has undergone total knee replacement on the right successfully in the past.  She did have preadmission testing performed and EKG performed today in the office showing no abnormalities that would preclude her undergoing the surgical procedure as planned    Review of Systems   Constitutional:  Positive for activity change. Negative for appetite change, chills, diaphoresis, fatigue, fever and unexpected weight change.        Admits to some restriction in activity level secondary to pain in her knee on the left   HENT: Negative.     Eyes: Negative.    Respiratory: Negative.     Cardiovascular: Negative.    Gastrointestinal: Negative.    Endocrine: Negative.    Genitourinary: Negative.    Musculoskeletal:  Positive for arthralgias and gait problem. Negative for back pain, joint swelling, myalgias, neck pain and neck stiffness.   Skin: Negative.    Allergic/Immunologic: Negative.    Neurological:  Negative for dizziness, tremors, seizures, syncope, facial asymmetry, speech difficulty, weakness, light-headedness, numbness and headaches.   Hematological: Negative.    Psychiatric/Behavioral: Negative.       Past Medical History:   Diagnosis Date    Arthritis 2008    Disease of thyroid gland 1976    Hypertension 2004     Past Surgical History:   Procedure Laterality Date    BRAIN SURGERY  11/8/2019    Benign lt. Parietal Meningioma     Family History   Problem Relation Age of  Onset    Heart disease Father     Hypertension Mother     Diabetes Mother         Latent    Arthritis Mother     Stroke Paternal Grandmother      Social History     Tobacco Use    Smoking status: Former     Current packs/day: 0.00     Average packs/day: 1 pack/day for 25.0 years (25.0 ttl pk-yrs)     Types: Cigarettes     Start date: 1982     Quit date: 2007     Years since quittin.8    Smokeless tobacco: Never    Tobacco comments:     Quit off & on up until . Haven't smoked since   Vaping Use    Vaping status: Never Used   Substance and Sexual Activity    Alcohol use: No    Drug use: Never    Sexual activity: Not Currently     Partners: Male     Birth control/protection: None     Current Outpatient Medications on File Prior to Visit   Medication Sig    amLODIPine (NORVASC) 10 mg tablet TAKE 1 TABLET BY MOUTH EVERY DAY    aspirin (ECOTRIN LOW STRENGTH) 81 mg EC tablet Take 81 mg by mouth    famotidine (PEPCID) 20 mg tablet TAKE 1 TABLET BY MOUTH TWICE A DAY    levETIRAcetam (KEPPRA) 250 mg tablet TK 1 T PO BID    levothyroxine 100 mcg tablet Take 1 tablet (100 mcg total) by mouth daily    loratadine (CLARITIN) 10 mg tablet Take 10 mg by mouth daily    naproxen (NAPROSYN) 500 mg tablet TAKE 1 TABLET TWICE A DAY BY ORAL ROUTE AS NEEDED.    rosuvastatin (CRESTOR) 5 mg tablet TAKE 1 TABLET (5 MG TOTAL) BY MOUTH DAILY.    traMADol (ULTRAM) 50 mg tablet Take 50 mg by mouth every 6 (six) hours as needed    valsartan-hydrochlorothiazide (DIOVAN-HCT) 320-12.5 MG per tablet TAKE 1 TABLET BY MOUTH EVERY DAY    Vitamin D, Ergocalciferol, 50 MCG (2000 UT) CAPS Take by mouth    dexamethasone (DECADRON) 1 mg tablet Take one tablet between 22:00 and 24:00 (10pm and midnight)    Multiple Vitamin (MULTIVITAMIN ADULT PO) Take 1 tablet by mouth every other day     Allergies   Allergen Reactions    Medical Tape Irritability     Paper tape was used.      Immunization History   Administered Date(s) Administered     "COVID-19 MODERNA VACC 0.5 ML IM 01/07/2021, 02/05/2021, 11/02/2021    INFLUENZA 11/25/2011    Influenza, high dose seasonal 0.7 mL 10/03/2023    Influenza, injectable, quadrivalent, preservative free 0.5 mL 09/19/2020    Td (adult), Unspecified 01/01/2007     Objective     /68   Pulse 101   Temp 98.6 °F (37 °C) (Tympanic)   Ht 4' 11\" (1.499 m)   Wt 110 kg (242 lb)   SpO2 94%   BMI 48.88 kg/m²     Physical Exam  Vitals and nursing note reviewed.   Constitutional:       General: She is not in acute distress.     Appearance: Normal appearance. She is obese. She is not ill-appearing, toxic-appearing or diaphoretic.      Comments: Pleasant, articulate, obese 69-year-old female who is awake alert in no acute distress oriented x 3 walking with a cane   HENT:      Head: Normocephalic and atraumatic.      Right Ear: Tympanic membrane, ear canal and external ear normal. There is no impacted cerumen.      Left Ear: Tympanic membrane, ear canal and external ear normal. There is no impacted cerumen.      Nose: Nose normal. No congestion or rhinorrhea.      Mouth/Throat:      Mouth: Mucous membranes are moist.      Pharynx: Oropharynx is clear. No oropharyngeal exudate or posterior oropharyngeal erythema.   Eyes:      General: No scleral icterus.        Right eye: No discharge.         Left eye: No discharge.      Extraocular Movements: Extraocular movements intact.      Conjunctiva/sclera: Conjunctivae normal.      Pupils: Pupils are equal, round, and reactive to light.   Neck:      Vascular: No carotid bruit.   Cardiovascular:      Rate and Rhythm: Normal rate and regular rhythm.      Pulses: Normal pulses.      Heart sounds: Normal heart sounds. No murmur heard.     No friction rub. No gallop.   Pulmonary:      Effort: Pulmonary effort is normal. No respiratory distress.      Breath sounds: Normal breath sounds. No stridor. No wheezing, rhonchi or rales.   Chest:      Chest wall: No tenderness.   Abdominal:      " General: Abdomen is flat. Bowel sounds are normal. There is no distension.      Palpations: Abdomen is soft. There is no mass.      Tenderness: There is no abdominal tenderness. There is no right CVA tenderness, left CVA tenderness, guarding or rebound.      Hernia: No hernia is present.   Musculoskeletal:         General: Swelling and deformity present. No tenderness or signs of injury.      Cervical back: Normal range of motion and neck supple. No rigidity or tenderness.      Right lower leg: Edema present.      Left lower leg: Edema present.      Comments: Patient has trace to +1 edema bilateral lower extremities which she states is worse by the end of the day.  Status post total knee replacement on the right, degenerative therapeutic changes on the left knee.  With difficulties with ambulation secondary to pain.   Lymphadenopathy:      Cervical: No cervical adenopathy.   Skin:     General: Skin is warm and dry.      Coloration: Skin is not jaundiced or pale.      Findings: No bruising, erythema, lesion or rash.   Neurological:      General: No focal deficit present.      Mental Status: She is alert and oriented to person, place, and time. Mental status is at baseline.      Cranial Nerves: No cranial nerve deficit.      Sensory: No sensory deficit.      Motor: No weakness.      Coordination: Coordination normal.      Gait: Gait abnormal.      Deep Tendon Reflexes: Reflexes abnormal.      Comments: Antalgic gait favoring her left knee secondary to degenerative arthritis.   Psychiatric:         Mood and Affect: Mood normal.         Behavior: Behavior normal.         Thought Content: Thought content normal.         Judgment: Judgment normal.

## 2024-09-24 NOTE — ASSESSMENT & PLAN NOTE
Chest x-ray reports this is stable.  Will go for echocardiogram for further evaluation    Orders:    Echo complete w/ contrast if indicated; Future

## 2024-09-24 NOTE — ASSESSMENT & PLAN NOTE
Patient has a history of diffuse arthritis.  History in the past of total knee replacement on the right and now is here today for preop evaluation prior to having surgery, total knee replacement on the left.  Patient did have preadmission testing and we did discuss the results and performed an EKG today in the office showing some minor abnormalities but nothing that would preclude her undergoing the procedure as planned.  She did have a recent chest x-ray which shows some stable aneurysm of the ascending aorta which has not been changed from previous studies.  She did undergo examination in the office and no contraindications for her to undergo surgery as planned.  She will stop the aspirin 5 to 7 days prior to procedure and other nonsteroidal anti-inflammatories.  Also was told to stop multivitamins again 7 days prior to the procedure.

## 2024-09-24 NOTE — ASSESSMENT & PLAN NOTE
Remains on thyroid hormone as previously.  Thyroid studies have been normal.  Patient will continue present medication and surveillance.

## 2024-09-30 DIAGNOSIS — I10 ESSENTIAL HYPERTENSION: ICD-10-CM

## 2024-10-01 DIAGNOSIS — K29.40 ATROPHIC GASTRITIS WITHOUT HEMORRHAGE: ICD-10-CM

## 2024-10-01 DIAGNOSIS — I10 ESSENTIAL HYPERTENSION: ICD-10-CM

## 2024-10-01 RX ORDER — AMLODIPINE BESYLATE 10 MG/1
10 TABLET ORAL DAILY
Qty: 90 TABLET | Refills: 1 | Status: SHIPPED | OUTPATIENT
Start: 2024-10-01

## 2024-10-01 RX ORDER — FAMOTIDINE 20 MG/1
20 TABLET, FILM COATED ORAL 2 TIMES DAILY
Qty: 180 TABLET | Refills: 1 | Status: SHIPPED | OUTPATIENT
Start: 2024-10-01

## 2024-10-01 RX ORDER — AMLODIPINE BESYLATE 10 MG/1
10 TABLET ORAL DAILY
Qty: 90 TABLET | Refills: 0 | OUTPATIENT
Start: 2024-10-01

## 2024-10-01 NOTE — TELEPHONE ENCOUNTER
Pharmacy told patient they never received a new script.    Reason for call:   [x] Refill   [] Prior Auth  [] Other:     Office:   [x] PCP/Provider - Raghav Nix DO   [] Specialty/Provider -     Medication:     - famotidine (PEPCID) 20 mg tablet. TAKE 1 TABLET BY MOUTH TWICE A DAY.  Qty: 180    - amLODIPine (NORVASC) 10 mg tablet. TAKE 1 TABLET BY MOUTH EVERY DAY.  Qty: 90    Pharmacy:  [facility]/pharmacy #[id number] - [place], [place] - [address]     Does the patient have enough for 3 days?   [] Yes   [x] No - Send as HP to POD

## 2024-10-09 ENCOUNTER — TELEPHONE (OUTPATIENT)
Age: 69
End: 2024-10-09

## 2024-10-09 NOTE — TELEPHONE ENCOUNTER
Zakia from [facility] called requesting patients pre-op clearance notes and EKG. Please fax asap.    [phone]   Leonel phone: [phone]    Thanks

## 2024-10-09 NOTE — TELEPHONE ENCOUNTER
Angela from [facility] called for office notes and EKG for pts surgery tomorrow. Please fax asap to [phone]. Angela states she needs it by 12pm 10/9/24. Thank you

## 2024-11-07 NOTE — ASSESSMENT & PLAN NOTE
Hyperglycemia but not overt diabetes.  Continue to follow-up with weight management and we will continue to monitor her sugar readings          Mild and asymptomatic.  Improved since prior.  No meds to cause at this time.    Repeat today

## 2024-11-20 DIAGNOSIS — E03.9 ACQUIRED HYPOTHYROIDISM: ICD-10-CM

## 2024-11-20 NOTE — TELEPHONE ENCOUNTER
Reason for call:   [x] Refill   [] Prior Auth  [] Other:     Office:   [x] PCP/Provider -   [] Specialty/Provider -     Medication: levothyroxine    Dose/Frequency: 100 mcg daily     Quantity: 90D    Pharmacy: [facility] [place]     Does the patient have enough for 3 days?   [x] Yes   [] No - Send as HP to POD

## 2024-11-21 RX ORDER — LEVOTHYROXINE SODIUM 100 UG/1
100 TABLET ORAL DAILY
Qty: 90 TABLET | Refills: 1 | Status: SHIPPED | OUTPATIENT
Start: 2024-11-21

## 2024-12-26 DIAGNOSIS — E78.00 PURE HYPERCHOLESTEROLEMIA: ICD-10-CM

## 2024-12-26 NOTE — TELEPHONE ENCOUNTER
Reason for call:   [x] Refill   [] Prior Auth  [] Other:     Office:   [x] PCP/Provider - Alphonso Otero MD   [] Specialty/Provider -     Medication: rosuvastatin (CRESTOR) 5 mg tablet    Dose/Frequency: 5 mg, Oral, Daily     Quantity: 90    Pharmacy: [facility]/pharmacy #[id number] - [place], [place] - [address]     Does the patient have enough for 3 days?   [x] Yes   [] No - Send as HP to POD

## 2024-12-27 RX ORDER — ROSUVASTATIN CALCIUM 5 MG/1
5 TABLET, COATED ORAL DAILY
Qty: 90 TABLET | Refills: 0 | Status: SHIPPED | OUTPATIENT
Start: 2024-12-27

## 2025-01-28 ENCOUNTER — TELEPHONE (OUTPATIENT)
Dept: BARIATRICS | Facility: CLINIC | Age: 70
End: 2025-01-28

## 2025-02-11 ENCOUNTER — RA CDI HCC (OUTPATIENT)
Dept: OTHER | Facility: HOSPITAL | Age: 70
End: 2025-02-11

## 2025-02-14 ENCOUNTER — TRANSCRIBE ORDERS (OUTPATIENT)
Dept: LAB | Facility: CLINIC | Age: 70
End: 2025-02-14

## 2025-02-14 ENCOUNTER — APPOINTMENT (OUTPATIENT)
Dept: LAB | Facility: CLINIC | Age: 70
End: 2025-02-14
Payer: MEDICARE

## 2025-02-14 DIAGNOSIS — E03.9 ACQUIRED HYPOTHYROIDISM: ICD-10-CM

## 2025-02-14 DIAGNOSIS — Z00.00 HEALTHCARE MAINTENANCE: Primary | ICD-10-CM

## 2025-02-14 DIAGNOSIS — Z00.00 HEALTHCARE MAINTENANCE: ICD-10-CM

## 2025-02-14 DIAGNOSIS — R73.9 HYPERGLYCEMIA: ICD-10-CM

## 2025-02-14 LAB
ALBUMIN SERPL BCG-MCNC: 4 G/DL (ref 3.5–5)
ALP SERPL-CCNC: 100 U/L (ref 34–104)
ALT SERPL W P-5'-P-CCNC: 10 U/L (ref 7–52)
ANION GAP SERPL CALCULATED.3IONS-SCNC: 7 MMOL/L (ref 4–13)
AST SERPL W P-5'-P-CCNC: 13 U/L (ref 13–39)
BACTERIA UR QL AUTO: ABNORMAL /HPF
BASOPHILS # BLD AUTO: 0.05 THOUSANDS/ÂΜL (ref 0–0.1)
BASOPHILS NFR BLD AUTO: 1 % (ref 0–1)
BILIRUB SERPL-MCNC: 0.3 MG/DL (ref 0.2–1)
BILIRUB UR QL STRIP: NEGATIVE
BUN SERPL-MCNC: 26 MG/DL (ref 5–25)
CALCIUM SERPL-MCNC: 10.5 MG/DL (ref 8.4–10.2)
CHLORIDE SERPL-SCNC: 98 MMOL/L (ref 96–108)
CHOLEST SERPL-MCNC: 172 MG/DL (ref ?–200)
CLARITY UR: CLEAR
CO2 SERPL-SCNC: 34 MMOL/L (ref 21–32)
COLOR UR: ABNORMAL
CREAT SERPL-MCNC: 0.86 MG/DL (ref 0.6–1.3)
EOSINOPHIL # BLD AUTO: 0.28 THOUSAND/ÂΜL (ref 0–0.61)
EOSINOPHIL NFR BLD AUTO: 5 % (ref 0–6)
ERYTHROCYTE [DISTWIDTH] IN BLOOD BY AUTOMATED COUNT: 13.4 % (ref 11.6–15.1)
EST. AVERAGE GLUCOSE BLD GHB EST-MCNC: 143 MG/DL
GFR SERPL CREATININE-BSD FRML MDRD: 69 ML/MIN/1.73SQ M
GLUCOSE P FAST SERPL-MCNC: 107 MG/DL (ref 65–99)
GLUCOSE UR STRIP-MCNC: NEGATIVE MG/DL
HBA1C MFR BLD: 6.6 %
HCT VFR BLD AUTO: 40.2 % (ref 34.8–46.1)
HDLC SERPL-MCNC: 61 MG/DL
HGB BLD-MCNC: 11.9 G/DL (ref 11.5–15.4)
HGB UR QL STRIP.AUTO: NEGATIVE
IMM GRANULOCYTES # BLD AUTO: 0.01 THOUSAND/UL (ref 0–0.2)
IMM GRANULOCYTES NFR BLD AUTO: 0 % (ref 0–2)
KETONES UR STRIP-MCNC: NEGATIVE MG/DL
LDLC SERPL CALC-MCNC: 87 MG/DL (ref 0–100)
LEUKOCYTE ESTERASE UR QL STRIP: ABNORMAL
LYMPHOCYTES # BLD AUTO: 1.71 THOUSANDS/ÂΜL (ref 0.6–4.47)
LYMPHOCYTES NFR BLD AUTO: 32 % (ref 14–44)
MCH RBC QN AUTO: 26.1 PG (ref 26.8–34.3)
MCHC RBC AUTO-ENTMCNC: 29.6 G/DL (ref 31.4–37.4)
MCV RBC AUTO: 88 FL (ref 82–98)
MONOCYTES # BLD AUTO: 0.48 THOUSAND/ÂΜL (ref 0.17–1.22)
MONOCYTES NFR BLD AUTO: 9 % (ref 4–12)
NEUTROPHILS # BLD AUTO: 2.74 THOUSANDS/ÂΜL (ref 1.85–7.62)
NEUTS SEG NFR BLD AUTO: 53 % (ref 43–75)
NITRITE UR QL STRIP: NEGATIVE
NON-SQ EPI CELLS URNS QL MICRO: ABNORMAL /HPF
NONHDLC SERPL-MCNC: 111 MG/DL
NRBC BLD AUTO-RTO: 0 /100 WBCS
PH UR STRIP.AUTO: 6.5 [PH]
PLATELET # BLD AUTO: 261 THOUSANDS/UL (ref 149–390)
PMV BLD AUTO: 9.8 FL (ref 8.9–12.7)
POTASSIUM SERPL-SCNC: 4.8 MMOL/L (ref 3.5–5.3)
PROT SERPL-MCNC: 6.7 G/DL (ref 6.4–8.4)
PROT UR STRIP-MCNC: NEGATIVE MG/DL
RBC # BLD AUTO: 4.56 MILLION/UL (ref 3.81–5.12)
RBC #/AREA URNS AUTO: ABNORMAL /HPF
SODIUM SERPL-SCNC: 139 MMOL/L (ref 135–147)
SP GR UR STRIP.AUTO: 1.02 (ref 1–1.03)
TRIGL SERPL-MCNC: 119 MG/DL (ref ?–150)
TSH SERPL DL<=0.05 MIU/L-ACNC: 7.97 UIU/ML (ref 0.45–4.5)
UROBILINOGEN UR STRIP-ACNC: <2 MG/DL
WBC # BLD AUTO: 5.27 THOUSAND/UL (ref 4.31–10.16)
WBC #/AREA URNS AUTO: ABNORMAL /HPF

## 2025-02-14 PROCEDURE — 86376 MICROSOMAL ANTIBODY EACH: CPT

## 2025-02-14 PROCEDURE — 80053 COMPREHEN METABOLIC PANEL: CPT

## 2025-02-14 PROCEDURE — 81001 URINALYSIS AUTO W/SCOPE: CPT

## 2025-02-14 PROCEDURE — 83036 HEMOGLOBIN GLYCOSYLATED A1C: CPT

## 2025-02-14 PROCEDURE — 84443 ASSAY THYROID STIM HORMONE: CPT

## 2025-02-14 PROCEDURE — 80061 LIPID PANEL: CPT

## 2025-02-14 PROCEDURE — 36415 COLL VENOUS BLD VENIPUNCTURE: CPT

## 2025-02-14 PROCEDURE — 85025 COMPLETE CBC W/AUTO DIFF WBC: CPT

## 2025-02-14 PROCEDURE — 86800 THYROGLOBULIN ANTIBODY: CPT

## 2025-02-15 LAB
THYROGLOB AB SERPL-ACNC: <1 IU/ML (ref 0–0.9)
THYROPEROXIDASE AB SERPL-ACNC: <9 IU/ML (ref 0–34)

## 2025-02-17 ENCOUNTER — OFFICE VISIT (OUTPATIENT)
Age: 70
End: 2025-02-17
Payer: MEDICARE

## 2025-02-17 VITALS
RESPIRATION RATE: 18 BRPM | BODY MASS INDEX: 48.99 KG/M2 | SYSTOLIC BLOOD PRESSURE: 142 MMHG | WEIGHT: 243 LBS | TEMPERATURE: 97.9 F | HEIGHT: 59 IN | HEART RATE: 76 BPM | OXYGEN SATURATION: 96 % | DIASTOLIC BLOOD PRESSURE: 75 MMHG

## 2025-02-17 DIAGNOSIS — M81.0 AGE-RELATED OSTEOPOROSIS WITHOUT CURRENT PATHOLOGICAL FRACTURE: ICD-10-CM

## 2025-02-17 DIAGNOSIS — E66.01 OBESITY, CLASS III, BMI 40-49.9 (MORBID OBESITY) (HCC): ICD-10-CM

## 2025-02-17 DIAGNOSIS — G40.909 SEIZURE DISORDER (HCC): ICD-10-CM

## 2025-02-17 DIAGNOSIS — E78.00 PURE HYPERCHOLESTEROLEMIA: ICD-10-CM

## 2025-02-17 DIAGNOSIS — E66.09 CLASS 2 OBESITY DUE TO EXCESS CALORIES IN ADULT, UNSPECIFIED BMI, UNSPECIFIED WHETHER SERIOUS COMORBIDITY PRESENT: ICD-10-CM

## 2025-02-17 DIAGNOSIS — I71.21 ANEURYSM OF ASCENDING AORTA WITHOUT RUPTURE (HCC): ICD-10-CM

## 2025-02-17 DIAGNOSIS — Z13.820 SCREENING FOR OSTEOPOROSIS: ICD-10-CM

## 2025-02-17 DIAGNOSIS — E03.9 ACQUIRED HYPOTHYROIDISM: ICD-10-CM

## 2025-02-17 DIAGNOSIS — Z00.00 MEDICARE ANNUAL WELLNESS VISIT, SUBSEQUENT: ICD-10-CM

## 2025-02-17 DIAGNOSIS — E66.812 CLASS 2 OBESITY DUE TO EXCESS CALORIES IN ADULT, UNSPECIFIED BMI, UNSPECIFIED WHETHER SERIOUS COMORBIDITY PRESENT: ICD-10-CM

## 2025-02-17 DIAGNOSIS — I10 ESSENTIAL HYPERTENSION: ICD-10-CM

## 2025-02-17 DIAGNOSIS — E11.9 TYPE 2 DIABETES MELLITUS WITHOUT COMPLICATION, WITHOUT LONG-TERM CURRENT USE OF INSULIN (HCC): Primary | ICD-10-CM

## 2025-02-17 PROCEDURE — G2211 COMPLEX E/M VISIT ADD ON: HCPCS | Performed by: INTERNAL MEDICINE

## 2025-02-17 PROCEDURE — G0439 PPPS, SUBSEQ VISIT: HCPCS | Performed by: INTERNAL MEDICINE

## 2025-02-17 PROCEDURE — 99214 OFFICE O/P EST MOD 30 MIN: CPT | Performed by: INTERNAL MEDICINE

## 2025-02-17 RX ORDER — LEVOTHYROXINE SODIUM 125 UG/1
125 TABLET ORAL
Qty: 100 TABLET | Refills: 3 | Status: SHIPPED | OUTPATIENT
Start: 2025-02-17

## 2025-02-17 RX ORDER — METFORMIN HYDROCHLORIDE 500 MG/1
500 TABLET, EXTENDED RELEASE ORAL
Qty: 30 TABLET | Refills: 5 | Status: SHIPPED | OUTPATIENT
Start: 2025-02-17

## 2025-02-17 NOTE — ASSESSMENT & PLAN NOTE
TSH is elevated that we need to make adjustments with her thyroid medication.  Has been placed on levothyroxine 150 mcg daily.  Check a TSH with her next visit.    Orders:    levothyroxine 125 mcg tablet; Take 1 tablet (125 mcg total) by mouth daily in the early morning    TSH, 3rd generation with Free T4 reflex; Future

## 2025-02-17 NOTE — PROGRESS NOTES
Name: Opal Arechiga      : 1955      MRN: [id number]  Encounter Provider: Raghav Nix DO  Encounter Date: 2025   Encounter department: [facility] INTERNAL MEDICINE    Assessment & Plan       Preventive health issues were discussed with patient, and age appropriate screening tests were ordered as noted in patient's After Visit Summary. Personalized health advice and appropriate referrals for health education or preventive services given if needed, as noted in patient's After Visit Summary.    History of Present Illness     HPI   Patient Care Team:  Raghav Nix DO as PCP - General    Review of Systems  Medical History Reviewed by provider this encounter:       Annual Wellness Visit Questionnaire   Opal is here for her Subsequent Wellness visit.     Health Risk Assessment:   Patient rates overall health as good. Patient feels that their physical health rating is slightly better. Patient is satisfied with their life. Eyesight was rated as same. Hearing was rated as same. Patient feels that their emotional and mental health rating is same. Patients states they are never, rarely angry. Patient states they are sometimes unusually tired/fatigued. Pain experienced in the last 7 days has been some. Patient's pain rating has been 7/10. Patient states that she has experienced weight loss or gain in last 6 months.     Fall Risk Screening:   In the past year, patient has experienced: no history of falling in past year      Urinary Incontinence Screening:   Patient has leaked urine accidently in the last six months.     Home Safety:  Patient does not have trouble with stairs inside or outside of their home. Patient has working smoke alarms and has working carbon monoxide detector. Home safety hazards include: medications that cause fatigue.     Nutrition:   Current diet is Regular.     Medications:   Patient is currently taking over-the-counter supplements. OTC medications include: see  medication list. Patient is able to manage medications.     Activities of Daily Living (ADLs)/Instrumental Activities of Daily Living (IADLs):   Walk and transfer into and out of bed and chair?: Yes  Dress and groom yourself?: Yes    Bathe or shower yourself?: Yes    Feed yourself? Yes  Do your laundry/housekeeping?: Yes  Manage your money, pay your bills and track your expenses?: Yes  Make your own meals?: Yes    Do your own shopping?: Yes    Previous Hospitalizations:   Any hospitalizations or ED visits within the last 12 months?: Yes    How many hospitalizations have you had in the last year?: 1-2    Advance Care Planning:   Living will: No    Durable POA for healthcare: No    Advanced directive: Yes      PREVENTIVE SCREENINGS      Cardiovascular Screening:    General: Screening Not Indicated and History Lipid Disorder      Diabetes Screening:     General: Screening Current      Colorectal Cancer Screening:     General: Screening Current      Breast Cancer Screening:     General: Screening Current      Cervical Cancer Screening:    General: Screening Not Indicated      Lung Cancer Screening:     General: Screening Not Indicated    Screening, Brief Intervention, and Referral to Treatment (SBIRT)     Screening  Typical number of drinks in a day: 0  Typical number of drinks in a week: 0  Interpretation: Low risk drinking behavior.    AUDIT-C Screenin) How often did you have a drink containing alcohol in the past year? monthly or less  2) How many drinks did you have on a typical day when you were drinking in the past year? 0  3) How often did you have 6 or more drinks on one occasion in the past year? never    AUDIT-C Score: 1  Interpretation: Score 0-2 (female): Negative screen for alcohol misuse    Single Item Drug Screening:  How often have you used an illegal drug (including marijuana) or a prescription medication for non-medical reasons in the past year? never    Single Item Drug Screen Score:  "0  Interpretation: Negative screen for possible drug use disorder    Social Drivers of Health     Financial Resource Strain: Low Risk  (10/10/2024)    Received from [facility]    Overall Financial Resource Strain (CARDIA)     Difficulty of Paying Living Expenses: Not hard at all   Food Insecurity: No Food Insecurity (2/12/2025)    Hunger Vital Sign     Worried About Running Out of Food in the Last Year: Never true     Ran Out of Food in the Last Year: Never true   Transportation Needs: No Transportation Needs (2/12/2025)    PRAPARE - Transportation     Lack of Transportation (Medical): No     Lack of Transportation (Non-Medical): No   Housing Stability: Low Risk  (2/12/2025)    Housing Stability Vital Sign     Unable to Pay for Housing in the Last Year: No     Number of Times Moved in the Last Year: 0     Homeless in the Last Year: No   Utilities: Not At Risk (2/12/2025)    [facility] Utilities     Threatened with loss of utilities: No     No results found.    Objective   Temp 97.9 °F (36.6 °C)   Ht 4' 11\" (1.499 m)   Wt 110 kg (243 lb)   BMI 49.08 kg/m²     Physical Exam    "

## 2025-02-17 NOTE — ASSESSMENT & PLAN NOTE
Recently had lab studies performed hemoglobin A1c 6.6.  Patient is in the range of prediabetes but we feel is extremely important that she be treated at this point in time and has been placed on metformin 500 mg XL 1 pill daily with breakfast.  Will check a fasting blood sugar hemoglobin A1c with her next visit.  Hopefully with a combination of the metformin and medication to help with weight loss she will have some improvement overall diabetic foot exam was negative and patient continues to follow-up on a regular basis with her podiatrist  Lab Results   Component Value Date    HGBA1C 6.6 (H) 02/14/2025       Orders:    Basic metabolic panel; Future    Hemoglobin A1C; Future    metFORMIN (GLUCOPHAGE-XR) 500 mg 24 hr tablet; Take 1 tablet (500 mg total) by mouth daily with dinner

## 2025-02-17 NOTE — ASSESSMENT & PLAN NOTE
Patient does have a longstanding history of elevated blood pressure readings.  She remains on medication as previously and her blood pressure is slightly elevated initially with presentation to the office today.  Once the patient was allowed to sit and relax it did come down to an acceptable range.  Patient will continue present medication and surveillance, continue to monitor renal function.

## 2025-02-17 NOTE — ASSESSMENT & PLAN NOTE
History of hyperlipidemia.  Patient remains on rosuvastatin 5 mg daily.  She admits that she is not always perfect with her diet.  Will continue present medication and surveillance.  Discussed again the importance of diet looking at decrease intake of fats and cholesterol with her diet.

## 2025-02-17 NOTE — PROGRESS NOTES
Name: Opal Arechiga      : 1955      MRN: [id number]  Encounter Provider: Raghav Nix DO  Encounter Date: 2025   Encounter department: [facility] INTERNAL MEDICINE    Assessment & Plan  Screening for osteoporosis    Orders:    DXA bone density spine hip and pelvis; Future    Acquired hypothyroidism  TSH is elevated that we need to make adjustments with her thyroid medication.  Has been placed on levothyroxine 150 mcg daily.  Check a TSH with her next visit.    Orders:    levothyroxine 125 mcg tablet; Take 1 tablet (125 mcg total) by mouth daily in the early morning    TSH, 3rd generation with Free T4 reflex; Future    Pure hypercholesterolemia  History of hyperlipidemia.  Patient remains on rosuvastatin 5 mg daily.  She admits that she is not always perfect with her diet.  Will continue present medication and surveillance.  Discussed again the importance of diet looking at decrease intake of fats and cholesterol with her diet.         Essential hypertension    Orders:    Basic metabolic panel; Future    Class 2 obesity due to excess calories in adult, unspecified BMI, unspecified whether serious comorbidity present      Orders:    Ambulatory Referral to Weight Management; Future    Age-related osteoporosis without current pathological fracture    Orders:    DXA bone density spine hip and pelvis; Future    Type 2 diabetes mellitus without complication, without long-term current use of insulin (HCC)  Recently had lab studies performed hemoglobin A1c 6.6.  Patient is in the range of prediabetes but we feel is extremely important that she be treated at this point in time and has been placed on metformin 500 mg XL 1 pill daily with breakfast.  Will check a fasting blood sugar hemoglobin A1c with her next visit.  Hopefully with a combination of the metformin and medication to help with weight loss she will have some improvement overall diabetic foot exam was negative and patient continues to  follow-up on a regular basis with her podiatrist  Lab Results   Component Value Date    HGBA1C 6.6 (H) 02/14/2025       Orders:    Basic metabolic panel; Future    Hemoglobin A1C; Future    metFORMIN (GLUCOPHAGE-XR) 500 mg 24 hr tablet; Take 1 tablet (500 mg total) by mouth daily with dinner    Medicare annual wellness visit, subsequent  Patient is a 70-year-old history of extensive medical problems outlined previously who is here today with for repeat Medicare wellness visit.  Patient did have extensive lab testing performed prior to the visit today and we did discuss the results of complaint with the patient including some abnormals.  Patient states that she is still recovering from her surgery but is now ambulating without any pain to lower extremities.  Still not back to normal as far as activity level was concerned but has just finished with physical therapy.  Denies any chest pain or pressure no increasing shortness of breath with exertion.  Patient has an appointment to be seen by weight management within the next week and hopefully can be started on medication in order to help with weight loss.  Patient is now given a diagnosis of diabetes mellitus type 2 and has been started on medication specifically metformin in order to help with weight loss and sugar control.         Seizure disorder (HCC)  Had resection of meningioma remains on seizure medication.  No breakthrough symptoms.         Obesity, Class III, BMI 40-49.9 (morbid obesity) ([facility])   weight is stable.  Has been unable to lose weight but definitely had some compromise with her exercise capacity.  Patient does have an appointment to be seen by weight management              History of Present Illness     70-year-old female history of medical problems outlined previously who is here today for a repeat Medicare wellness visit.  Patient did have extensive lab testing performed prior to the visit today we did discuss the results at length.  Patient  states she is just completed her physical therapy program.  She relates      Review of Systems   Constitutional: Negative.         Has just graduated physical therapy.  Still some difficulty getting around but moving better without pain   HENT: Negative.     Eyes: Negative.         Reminded the patient the importance of routine eye exams.   Respiratory: Negative.     Cardiovascular: Negative.    Gastrointestinal: Negative.    Endocrine: Negative.    Genitourinary: Negative.    Musculoskeletal: Negative.    Skin: Negative.    Allergic/Immunologic: Negative.    Neurological: Negative.    Hematological: Negative.    Psychiatric/Behavioral: Negative.       Past Medical History:   Diagnosis Date    Arthritis 2008    Disease of thyroid gland 1976    Hypertension 2004     Past Surgical History:   Procedure Laterality Date    BRAIN SURGERY  2019    Benign lt. Parietal Meningioma     Family History   Problem Relation Age of Onset    Heart disease Father     Hypertension Mother     Diabetes Mother         Latent    Arthritis Mother     Stroke Paternal Grandmother      Social History     Tobacco Use    Smoking status: Former     Current packs/day: 0.00     Average packs/day: 1 pack/day for 25.0 years (25.0 ttl pk-yrs)     Types: Cigarettes     Start date: 1982     Quit date: 2007     Years since quittin.2    Smokeless tobacco: Never    Tobacco comments:     Quit off & on up until . Haven't smoked since   Vaping Use    Vaping status: Never Used   Substance and Sexual Activity    Alcohol use: No    Drug use: Never    Sexual activity: Not Currently     Partners: Male     Birth control/protection: None     Current Outpatient Medications on File Prior to Visit   Medication Sig    amLODIPine (NORVASC) 10 mg tablet Take 1 tablet (10 mg total) by mouth daily    aspirin (ECOTRIN LOW STRENGTH) 81 mg EC tablet Take 81 mg by mouth    famotidine (PEPCID) 20 mg tablet Take 1 tablet (20 mg total) by mouth 2 (two)  "times a day    levETIRAcetam (KEPPRA) 250 mg tablet TK 1 T PO BID    loratadine (CLARITIN) 10 mg tablet Take 10 mg by mouth daily    naproxen (NAPROSYN) 500 mg tablet TAKE 1 TABLET TWICE A DAY BY ORAL ROUTE AS NEEDED.    rosuvastatin (CRESTOR) 5 mg tablet Take 1 tablet (5 mg total) by mouth daily    traMADol (ULTRAM) 50 mg tablet Take 50 mg by mouth every 6 (six) hours as needed    valsartan-hydrochlorothiazide (DIOVAN-HCT) 320-12.5 MG per tablet TAKE 1 TABLET BY MOUTH EVERY DAY    Vitamin D, Ergocalciferol, 50 MCG (2000 UT) CAPS Take by mouth    [DISCONTINUED] levothyroxine 100 mcg tablet Take 1 tablet (100 mcg total) by mouth daily    dexamethasone (DECADRON) 1 mg tablet Take one tablet between 22:00 and 24:00 (10pm and midnight)    Multiple Vitamin (MULTIVITAMIN ADULT PO) Take 1 tablet by mouth every other day     Allergies   Allergen Reactions    Medical Tape Irritability     Paper tape was used.      Immunization History   Administered Date(s) Administered    COVID-19 MODERNA VACC 0.5 ML IM 01/07/2021, 02/05/2021, 11/02/2021    INFLUENZA 11/25/2011    Influenza, high dose seasonal 0.7 mL 10/03/2023    Influenza, injectable, quadrivalent, preservative free 0.5 mL 09/19/2020    Td (adult), Unspecified 01/01/2007     Objective   /75   Pulse 76   Temp 97.9 °F (36.6 °C)   Resp 18   Ht 4' 11\" (1.499 m)   Wt 110 kg (243 lb)   SpO2 96%   BMI 49.08 kg/m²     Physical Exam  Vitals and nursing note reviewed.   Constitutional:       General: She is not in acute distress.     Appearance: Normal appearance. She is obese. She is not ill-appearing, toxic-appearing or diaphoretic.      Comments: Pleasant articulate obese 70-year-old female who is awake alert in no acute distress oriented x 3   HENT:      Head: Normocephalic and atraumatic.      Right Ear: Tympanic membrane, ear canal and external ear normal. There is no impacted cerumen.      Left Ear: Tympanic membrane, ear canal and external ear normal. There is " no impacted cerumen.      Nose: Nose normal. No congestion or rhinorrhea.      Mouth/Throat:      Mouth: Mucous membranes are moist.      Pharynx: Oropharynx is clear. No oropharyngeal exudate or posterior oropharyngeal erythema.   Eyes:      General: No scleral icterus.        Right eye: No discharge.         Left eye: No discharge.      Extraocular Movements: Extraocular movements intact.      Conjunctiva/sclera: Conjunctivae normal.      Pupils: Pupils are equal, round, and reactive to light.   Neck:      Vascular: No carotid bruit.   Cardiovascular:      Rate and Rhythm: Normal rate and regular rhythm.      Pulses: Normal pulses.      Heart sounds: Normal heart sounds. No murmur heard.     No friction rub. No gallop.   Pulmonary:      Effort: Pulmonary effort is normal. No respiratory distress.      Breath sounds: Normal breath sounds. No stridor. No wheezing, rhonchi or rales.   Chest:      Chest wall: No tenderness.   Abdominal:      General: Abdomen is flat. Bowel sounds are normal. There is no distension.      Palpations: Abdomen is soft. There is no mass.      Tenderness: There is no abdominal tenderness. There is no right CVA tenderness, left CVA tenderness, guarding or rebound.      Hernia: No hernia is present.   Musculoskeletal:         General: No swelling, tenderness, deformity or signs of injury.      Cervical back: Normal range of motion and neck supple. No rigidity or tenderness.      Right lower leg: Edema present.      Left lower leg: Edema present.      Comments: Patient has trace to +1 edema bilateral lower extremities which she states is worse by the end of the day.  Status post total knee replacement on the right, patient relates now ambulating without pain.   Lymphadenopathy:      Cervical: No cervical adenopathy.   Skin:     General: Skin is warm and dry.      Coloration: Skin is not jaundiced or pale.      Findings: No bruising, erythema, lesion or rash.   Neurological:      General: No  "focal deficit present.      Mental Status: She is alert and oriented to person, place, and time. Mental status is at baseline.      Cranial Nerves: No cranial nerve deficit.      Sensory: No sensory deficit.      Motor: No weakness.      Coordination: Coordination normal.      Gait: Gait abnormal.      Deep Tendon Reflexes: Reflexes abnormal.      Comments: Slightly antalgic gait.  States that she is slowly getting back to her baseline as far as ambulation and movement with the help of physical therapy.  Patient has absent patella and Achilles tendon reflexes bilaterally.   Psychiatric:         Mood and Affect: Mood normal.         Behavior: Behavior normal.         Thought Content: Thought content normal.         Judgment: Judgment normal.         Answers submitted by the patient for this visit:  Medicare Annual Wellness Visit (Submitted on 2/12/2025)  How would you rate your overall health?: good  Compared to last year, how is your physical health?: slightly better  In general, how satisfied are you with your life?: satisfied  Compared to last year, how is your eyesight?: same  Compared to last year, how is your hearing?: same  Compared to last year, how is your emotional/mental health?: same  How often is anger a problem for you?: never, rarely  How often do you feel unusually tired/fatigued?: sometimes  In the past 7 days, how much pain have you experienced?: some  If you answered \"some\" or \"a lot\", please rate the severity of your pain on a scale of 1 to 10 (1 being the least severe pain and 10 being the most intense pain).: 7/10  In the past 6 months, have you lost or gained 10 pounds without trying?: Yes  One or more falls in the last year: No  In the past 6 months, have you accidentally leaked urine?: Yes  Do you have trouble with the stairs inside or outside your home?: No  Does your home have working smoke alarms?: Yes  Does your home have a carbon monoxide monitor?: Yes  Which safety hazards (if any) have " you experienced in your home? Please select all that apply.: medications that cause fatigue  How would you describe your current diet? Please select all that apply.: Regular  In addition to prescription medications, are you taking any over-the-counter supplements?: Yes  If yes, what supplements are you taking?: Vit D. Stool softener  Can you manage your medications?: Yes  Are you currently taking any opioid medications?: No  Can you walk and transfer into and out of your bed and chair?: Yes  Can you dress and groom yourself?: Yes  Can you bathe or shower yourself?: Yes  Can you feed yourself?: Yes  Can you do your laundry/ housekeeping?: Yes  Can you manage your money, pay your bills, and track your expenses?: Yes  Can you make your own meals?: Yes  Can you do your own shopping?: Yes  Within the last 12 months, have you had any hospitalizations or Emergency Department visits?: Yes  If yes, how many times have you been hospitalized within the past year?: 1-2  Do you have a living will?: No  Do you have a Durable POA (Power of ) for healthcare decisions?: No  Do you have an Advanced Directive for end of life decisions?: Yes  How often have you used an illegal drug (including marijuana) or a prescription medication for non-medical reasons in the past year?: never  What is the typical number of drinks you consume in a day?: 0  What is the typical number of drinks you consume in a week?: 0  How often did you have a drink containing alcohol in the past year?: monthly or less  How many drinks did you have on a typical day  when you were drinking in the past year?: 0  How often did you have 6 or more drinks on one occasion in the past year?: never  Diabetic Foot Exam

## 2025-02-17 NOTE — ASSESSMENT & PLAN NOTE
weight is stable.  Has been unable to lose weight but definitely had some compromise with her exercise capacity.  Patient does have an appointment to be seen by weight management

## 2025-02-17 NOTE — ASSESSMENT & PLAN NOTE
Patient is a 70-year-old history of extensive medical problems outlined previously who is here today with for repeat Medicare wellness visit.  Patient did have extensive lab testing performed prior to the visit today and we did discuss the results of complaint with the patient including some abnormals.  Patient states that she is still recovering from her surgery but is now ambulating without any pain to lower extremities.  Still not back to normal as far as activity level was concerned but has just finished with physical therapy.  Denies any chest pain or pressure no increasing shortness of breath with exertion.  Patient has an appointment to be seen by weight management within the next week and hopefully can be started on medication in order to help with weight loss.  Patient is now given a diagnosis of diabetes mellitus type 2 and has been started on medication specifically metformin in order to help with weight loss and sugar control.

## 2025-02-24 ENCOUNTER — OFFICE VISIT (OUTPATIENT)
Dept: BARIATRICS | Facility: CLINIC | Age: 70
End: 2025-02-24
Payer: MEDICARE

## 2025-02-24 VITALS
WEIGHT: 240.4 LBS | HEART RATE: 90 BPM | SYSTOLIC BLOOD PRESSURE: 126 MMHG | TEMPERATURE: 98.3 F | HEIGHT: 59 IN | DIASTOLIC BLOOD PRESSURE: 72 MMHG | RESPIRATION RATE: 16 BRPM | BODY MASS INDEX: 48.46 KG/M2

## 2025-02-24 DIAGNOSIS — E66.09 CLASS 2 OBESITY DUE TO EXCESS CALORIES IN ADULT, UNSPECIFIED BMI, UNSPECIFIED WHETHER SERIOUS COMORBIDITY PRESENT: ICD-10-CM

## 2025-02-24 DIAGNOSIS — E66.813 CLASS 3 SEVERE OBESITY WITH SERIOUS COMORBIDITY AND BODY MASS INDEX (BMI) OF 40.0 TO 44.9 IN ADULT, UNSPECIFIED OBESITY TYPE (HCC): Primary | ICD-10-CM

## 2025-02-24 DIAGNOSIS — E66.812 CLASS 2 OBESITY DUE TO EXCESS CALORIES IN ADULT, UNSPECIFIED BMI, UNSPECIFIED WHETHER SERIOUS COMORBIDITY PRESENT: ICD-10-CM

## 2025-02-24 DIAGNOSIS — E11.9 TYPE 2 DIABETES MELLITUS WITHOUT COMPLICATION (HCC): ICD-10-CM

## 2025-02-24 DIAGNOSIS — E66.01 CLASS 3 SEVERE OBESITY WITH SERIOUS COMORBIDITY AND BODY MASS INDEX (BMI) OF 40.0 TO 44.9 IN ADULT, UNSPECIFIED OBESITY TYPE (HCC): Primary | ICD-10-CM

## 2025-02-24 PROCEDURE — 99213 OFFICE O/P EST LOW 20 MIN: CPT | Performed by: STUDENT IN AN ORGANIZED HEALTH CARE EDUCATION/TRAINING PROGRAM

## 2025-02-24 NOTE — PROGRESS NOTES
Assessment & Plan  Class 3 Obesity    Type 2 diabetes mellitus without complication (HCC)    Lab Results   Component Value Date    HGBA1C 6.6 (H) 02/14/2025     Highest weight: 251 (2016)  Lowest Weight: 229  Current weight: 240      Dietary modifications: Recommend to avoid skipping any meals. Meal frequency (3 meals 2-3 snacks in between) can potentially improve metabolism and allow for better portion control by decreasing Ghrelin (hunger hormone).  Informed patient that a lot of her hunger may be secondary to lack of fluids which I recommend to increase.    Mindful Eating and Drinking is necessary to promote healthy behaviors that can lead to decreased adipose tissue which can be curative and preventative for comorbidities such as hypertension, diabetes, anxiety, depression, osteoarthritis, dyslipidemia, asthma, liver disease, cardiovascular disease, stroke, sleep apnea and cancers.    Patient's co-morbilities include type 2 diabetes, GERD     Macronutrients: (Nutrients that the body needs for energy and support vital functions)    Protein is necessary to promote satiety, metabolism, and muscle growth/repair. Increased energy expenditure is used for the processes of breaking down and rebuilding proteins within the body   Carbohydrates are essential as it is the body's main fuel source for daily activities.  Recommend that carbohydrates be consumed mostly during the times you are more active rather than high amounts before bedtime  Fats: Essential vitamins like A, D, and E, protect your organs, support cell growth and contribute to maintaining healthy cholesterol levels      Fluid intake which is at least half your body weight in ounces is necessary to help control cravings (decreasing confusion for appetite vs water deprivation) as the human body is made up of 50-70% of Fluids. If there is a diversion for water alone, would recommend flavored water (example-splash of lemonade or ice tea) to help promote  compliance. Fluids include Teas, water, flavored water, seltzer water, coffee, shakes    Metabolism:    Metabolism can also be promoted by meal frequency, protein intake and increased muscle mass     Daily Calorie Needs: are individualized and will need to take into account any fluid losses, increased activity levels which may need to be adjusted daily. Recommended to not skip any meals even if there is a lack of appetite as it can be suppressed by caffeine or any prior heavy meal due to Leptin (satiety hormone).  Calorie and fluid intake will need to be increased if there is any increased activity during the day compared to previous days.  With proper fluid and macronutrient intake throughout the day, portion control and decreased cravings will improve     Weight check: BMI and weight serve as only guidelines as it is not factor in muscle mass, fat, water. Weights can fluctuate depending on fluid shifts vs what foods are consumed prior to checking your weight      Return in about 6 weeks (around 4/7/2025) for followup .     Most recent notes, labs and previous medical records were reviewed.       ______________________________________________________________________        Subjective:     Chief Complaint   Patient presents with    Follow-up     Mwm f/u : waist: 51in       HPI:  70-year-old female past medical history of class III obesity, hypertension, hypothyroidism, dyslipidemia, GERD, type 2 diabetes new onset A1c 6.6, seizure disorder presents to the clinic for follow-up    Previous attempts: Diet and Exercise, healthy portions, Partial meal replacement     Dietary Regimen:  Breakfast: Egg, low carb bagels, oatmeal             Lunch: Yogurt, berries   Dinner: [place], meat potatoes, chicken, fish   Snacks:Hunger/Cravings: Sweets, Chips, pretzels, crackers  Dining out:  once a week  Fluids:Water 30 oz             Ice tea (artifical sweetener)            [place] milk with cereal or overnight oats    Hobbies:  "[employer]     Physical Activity:  Chair exercises every day. Knee exercises     Occupation:Retired CT scan tech             Review Of Systems:  General: No pallor, no weakness   Pulmonary: Negative for shortness of breath  Chest: negative for chest pain  Gastrointestinal:  Negative for abdominal pain, diarrhea   Psychiatric/Behavioral:  Negative for behavioral problems, confusion, dysphoric mood and hallucinations.    All other systems reviewed and are negative.     Objective:  /72   Pulse 90   Temp 98.3 °F (36.8 °C)   Resp 16   Ht 4' 11\" (1.499 m)   Wt 109 kg (240 lb 6.4 oz)   BMI 48.55 kg/m²     Wt Readings from Last 30 Encounters:   02/24/25 109 kg (240 lb 6.4 oz)   02/17/25 110 kg (243 lb)   09/24/24 110 kg (242 lb)   08/07/24 110 kg (243 lb)   05/29/24 106 kg (234 lb)   04/09/24 104 kg (229 lb 12.8 oz)   02/15/24 103 kg (226 lb)   10/03/23 98.4 kg (217 lb)   02/13/23 101 kg (222 lb 6.4 oz)   09/16/22 94.6 kg (208 lb 9.6 oz)   05/16/22 93.9 kg (207 lb)   01/24/22 92.1 kg (203 lb)   07/19/21 91.2 kg (201 lb)   01/18/21 105 kg (232 lb)   10/16/20 104 kg (229 lb)   02/17/20 102 kg (224 lb)   11/18/19 99.9 kg (220 lb 3.2 oz)   07/26/19 106 kg (234 lb)   01/25/19 103 kg (227 lb 6.4 oz)   06/28/18 102 kg (224 lb)   12/28/17 101 kg (222 lb)   10/26/17 99.8 kg (220 lb)   12/15/16 113 kg (249 lb 6 oz)   08/23/16 114 kg (251 lb 6 oz)   05/27/16 110 kg (243 lb)       Physical Exam  Constitutional:       General: No acute distress.  Well-nourished  HENT:      Head: Normocephalic and atraumatic.   Eyes:      Extraocular Movements: Extraocular movements intact.      Conjunctiva/pupils: Conjunctivae normal. Pupils are equal, round  Pulmonary:      Effort: Pulmonary effort is normal. No labored breathing   Neurological:      General: No focal deficit present.  AO x 3     Mental Status: Alert and oriented to person, place, and time. Mental status is at baseline.   Psychiatric:         Mood and Affect: Mood " normal.         Behavior: Behavior normal.     Labs and Imaging  Recent labs and imaging have been personally reviewed.

## 2025-02-27 ENCOUNTER — HOSPITAL ENCOUNTER (OUTPATIENT)
Dept: NON INVASIVE DIAGNOSTICS | Facility: CLINIC | Age: 70
Discharge: HOME | End: 2025-02-27
Payer: MEDICARE

## 2025-02-27 ENCOUNTER — RESULTS FOLLOW-UP (OUTPATIENT)
Age: 70
End: 2025-02-27

## 2025-02-27 VITALS
WEIGHT: 240 LBS | HEART RATE: 90 BPM | DIASTOLIC BLOOD PRESSURE: 72 MMHG | HEIGHT: 59 IN | BODY MASS INDEX: 48.38 KG/M2 | SYSTOLIC BLOOD PRESSURE: 126 MMHG

## 2025-02-27 DIAGNOSIS — I71.21 ANEURYSM OF ASCENDING AORTA WITHOUT RUPTURE (HCC): ICD-10-CM

## 2025-02-27 LAB
AORTIC ROOT: 2.6 CM
ASCENDING AORTA: 3.4 CM
BSA FOR ECHO PROCEDURE: 1.99 M2
E WAVE DECELERATION TIME: 184 MS
E/A RATIO: 0.9
FRACTIONAL SHORTENING: 39 (ref 28–44)
INTERVENTRICULAR SEPTUM IN DIASTOLE (PARASTERNAL SHORT AXIS VIEW): 1.5 CM
INTERVENTRICULAR SEPTUM: 1.5 CM (ref 0.6–1.1)
LAAS-AP2: 27.1 CM2
LAAS-AP4: 27 CM2
LEFT ATRIUM SIZE: 5.1 CM
LEFT ATRIUM VOLUME (MOD BIPLANE): 95 ML
LEFT ATRIUM VOLUME INDEX (MOD BIPLANE): 47.7 ML/M2
LEFT INTERNAL DIMENSION IN SYSTOLE: 3.4 CM (ref 2.1–4)
LEFT VENTRICULAR INTERNAL DIMENSION IN DIASTOLE: 5.6 CM (ref 3.5–6)
LEFT VENTRICULAR POSTERIOR WALL IN END DIASTOLE: 1.2 CM
LEFT VENTRICULAR STROKE VOLUME: 109 ML
LV EF US.2D.A4C+ESTIMATED: 64 %
LVSV (TEICH): 109 ML
MV E'TISSUE VEL-LAT: 13 CM/S
MV E'TISSUE VEL-SEP: 12 CM/S
MV PEAK A VEL: 1.09 M/S
MV PEAK E VEL: 98 CM/S
MV STENOSIS PRESSURE HALF TIME: 53 MS
MV VALVE AREA P 1/2 METHOD: 4.15
RIGHT ATRIUM AREA SYSTOLE A4C: 17.2 CM2
RIGHT VENTRICLE ID DIMENSION: 4.1 CM
SL CV LEFT ATRIUM LENGTH A2C: 6.1 CM
SL CV LV EF: 60
SL CV PED ECHO LEFT VENTRICLE DIASTOLIC VOLUME (MOD BIPLANE) 2D: 156 ML
SL CV PED ECHO LEFT VENTRICLE SYSTOLIC VOLUME (MOD BIPLANE) 2D: 46 ML

## 2025-02-27 PROCEDURE — 93306 TTE W/DOPPLER COMPLETE: CPT

## 2025-02-27 PROCEDURE — 93306 TTE W/DOPPLER COMPLETE: CPT | Performed by: INTERNAL MEDICINE

## 2025-02-28 ENCOUNTER — TELEPHONE (OUTPATIENT)
Age: 70
End: 2025-02-28

## 2025-02-28 NOTE — TELEPHONE ENCOUNTER
"Patient called inquiring about results of her echocardiogram. Results read per provider note:    \"2/27/25  5:31 PM  I hope you can see the results of her echocardiogram but were happy to report that everything is normal.  If you have any questions or concerns do not be afraid to call.\"    No further questions.     "

## 2025-03-12 DIAGNOSIS — E11.9 TYPE 2 DIABETES MELLITUS WITHOUT COMPLICATION, WITHOUT LONG-TERM CURRENT USE OF INSULIN (HCC): ICD-10-CM

## 2025-03-13 RX ORDER — METFORMIN HYDROCHLORIDE 500 MG/1
500 TABLET, EXTENDED RELEASE ORAL
Qty: 90 TABLET | Refills: 2 | Status: SHIPPED | OUTPATIENT
Start: 2025-03-13

## 2025-03-19 PROBLEM — Z00.00 MEDICARE ANNUAL WELLNESS VISIT, SUBSEQUENT: Status: RESOLVED | Noted: 2020-10-16 | Resolved: 2025-03-19

## 2025-03-28 DIAGNOSIS — I10 ESSENTIAL HYPERTENSION: ICD-10-CM

## 2025-03-28 RX ORDER — AMLODIPINE BESYLATE 10 MG/1
10 TABLET ORAL DAILY
Qty: 90 TABLET | Refills: 1 | Status: SHIPPED | OUTPATIENT
Start: 2025-03-28

## 2025-04-03 ENCOUNTER — NURSE TRIAGE (OUTPATIENT)
Age: 70
End: 2025-04-03

## 2025-04-03 DIAGNOSIS — U07.1 COVID: Primary | ICD-10-CM

## 2025-04-03 NOTE — TELEPHONE ENCOUNTER
"FOLLOW UP: Offered Virtual appointment. Pt declined. Home care advice Provided. Pt will call back if symptoms worsen as reviewed.    REASON FOR CONVERSATION: COVID-19    SYMPTOMS: see below    OTHER: Pt in no acute distress. Denies further questions or concerns at this time.    DISPOSITION: Discuss With PCP and Callback by Nurse Within 1 Hour    Triage forwarded for PCP review and further advice.      Reason for Disposition   HIGH RISK patient (e.g., weak immune system, age > 64 years, obesity with BMI of 30 or higher, pregnant, chronic lung disease) and COVID symptoms (e.g., cough, fever) (Exceptions: Already seen by doctor or NP/PA and no new or worsening symptoms.)    Answer Assessment - Initial Assessment Questions  1. SYMPTOMS: \"What is your main symptom or concern?\" (e.g., cough, fever, shortness of breath, muscle aches)       Symptoms include muscle aches, HA, fatigue, nasal congestion, decreased appetite, and dry cough. Pt denies fever.   2. ONSET: \"When did the symptoms start?\"       2 days ago  3. BREATHING DIFFICULTY: \"Are you having any difficulty breathing?\" (e.g., normal; shortness of breath, wheezing, unable to speak)       Speaking in full sentences. No audible wheeze. Pt does not report SOB. Pt will call back with the same.   4. BETTER-SAME-WORSE: \"Are you getting better, staying the same or getting worse compared to yesterday?\"  If getting worse, ask, \"In what way?\"      Pt reports symptoms have improved some today from yesterday. Pt tolerating fluids. Had toast this morning. Muscle aches have improved with motrin.  5. COVID-19 DIAGNOSIS: \"How do you know that you have COVID?\" (e.g., positive lab test or self-test, diagnosed by doctor or NP/PA, symptoms after exposure).      At home test positive last night.    Protocols used: COVID-19 - Diagnosed or Suspected-Adult-[place]    "

## 2025-04-03 NOTE — ASSESSMENT & PLAN NOTE
Patient called regarding upper respiratory tract infection.  She has been ill since Monday and finally yesterday because of overall symptoms of muscle aches and pains fatigue she did COVID testing which was positive.  Patient states she is feeling better today able to keep hydrated and eating soup.  We discussed treatment as far as taking vitamin C vitamin D and zinc tablets if available.  Keeping well-hydrated, well-nourished.  Allowed to take Tylenol for any muscle aches and pains and to continue to keep in contact with our office especially if any secondary infection may become evident.

## 2025-04-04 DIAGNOSIS — U07.1 COVID: Primary | ICD-10-CM

## 2025-04-04 RX ORDER — BENZONATATE 100 MG/1
100 CAPSULE ORAL 3 TIMES DAILY PRN
Qty: 45 CAPSULE | Refills: 3 | Status: SHIPPED | OUTPATIENT
Start: 2025-04-04

## 2025-04-04 NOTE — ASSESSMENT & PLAN NOTE
Patient still having problems with a cough, tickle.  Patient has no fever or chills and no significant shortness of breath no wheezing.  We placed a prescription for Tessalon Perles for the patient to take.  She has been gargling with warm salt water for her sore throat.  Was told to call over the weekend if any questions or concerns.

## 2025-04-04 NOTE — TELEPHONE ENCOUNTER
Patient called states spoke to Dr. Nix and was advised to call and give update on symptoms. Pt states still experiencing same symptoms and no fever. States Dr. Nix had advised prescribing medication to treat symptoms.        Please advise.  Thank you

## 2025-04-07 ENCOUNTER — PATIENT MESSAGE (OUTPATIENT)
Age: 70
End: 2025-04-07

## 2025-04-07 NOTE — TELEPHONE ENCOUNTER
Pt called back. Continues to have symptoms of covid. Most recent is a sore throat. Discussed post nasal drip. Pt concerned with possible infection. Pt denies fever, mucous is cloudy. Denies any SOB. Pt has been doing salt water gargles.    Pt continues to test positive on home covid test. Will attach photo of test result for chart. Discussed test can show positive for up to weeks following covid infection.    Advised virtual appointment at this time. Scheduled for tomorrow with Dr Montenegro. Pt to call back with new or worsening of symptoms before then. Pt agreeable with plan.

## 2025-04-07 NOTE — PROGRESS NOTES
COVID-19 Outpatient Progress Note  Name: Opal Arechiga      : 1955      MRN: [id number]  Encounter Provider: Gabriel Montenegro DO  Encounter Date: 2025   Encounter department: [facility]  :  Assessment & Plan  COVID  She presents today for virtual visit concerning of sore throat after testing positive for COVID-19 infection.    Patient first called our office on  stating that she had mild symptoms starting on . She tested positive for COVID-19 infection on 2025.   She is still experiencing a sore throat and congestion and she reports improvement but not 100% better.  She does state that she is about 99% better however she still has lingering symptoms of nasal congestion, ear pressure and popping, runny nose, postnasal drip, and sore throat.  She is no longer having any fever, chills, nausea, vomiting, diarrhea, constipation, chest pains, shortness of breath.  She has used Tessalon Perles prescribed by her primary care provider with good relief in her cough symptoms.  She is currently not using any nasal sprays.  I suspect that her postnasal drip is likely contributing to her throat irritation and cough.  Recommended using Flonase to relieve rhinorrhea and postnasal drip.  Orders:    fluticasone (FLONASE) 50 mcg/act nasal spray; 1 spray into each nostril daily        Disposition:     Discussed symptom directed medication options with patient.          Recent Visits  No visits were found meeting these conditions.  Showing recent visits within past 7 days and meeting all other requirements  Today's Visits  Date Type Provider Dept   25 Telemedicine Gabriel Montenegro DO [facility]   Showing today's visits and meeting all other requirements  Future Appointments  No visits were found meeting these conditions.  Showing future appointments within next 150 days and meeting all other requirements    History of Present Illness     Subjective:   Opal Arechiga  is a 70 y.o. female who is concerned about COVID-19. Patient's symptoms include nasal congestion, rhinorrhea and cough. Patient denies fever, chills, sore throat, shortness of breath, abdominal pain, nausea, vomiting, diarrhea and headaches.     - Date of symptom onset: 4/1/2025      COVID-19 vaccination status: Fully vaccinated with booster    Ms. Miley Arechiga is a 70-year-old female with past medical history of hypertension, GERD, hypothyroidism, type 2 diabetes, anxiety, hyperlipidemia. She presents today for virtual visit concerning of sore throat after testing positive for COVID-19 infection.  Patient first called our office on 04/03 stating that she had mild symptoms with tested positive for COVID.  She tested positive for COVID-19 infection on 04/05/2025.  She is still experiencing a sore throat and congestion and she reports improvement but not 100% better.      Lab Results   Component Value Date    SARSCOV2 Positive (A) 01/05/2022    SARSCOV2 Negative 08/23/2021    SARSCOVAG Negative 05/29/2024       Review of Systems   Constitutional:  Negative for chills and fever.   HENT:  Positive for congestion, postnasal drip and rhinorrhea. Negative for sore throat.    Respiratory:  Positive for cough. Negative for shortness of breath and wheezing.    Cardiovascular:  Negative for chest pain and leg swelling.   Gastrointestinal:  Negative for abdominal distention, abdominal pain, constipation, diarrhea, nausea and vomiting.   Genitourinary:  Negative for difficulty urinating and dysuria.   Musculoskeletal:  Negative for arthralgias and back pain.   Skin:  Negative for rash and wound.   Neurological:  Negative for dizziness, syncope, weakness, light-headedness and headaches.   Psychiatric/Behavioral:  Negative for agitation, behavioral problems and confusion.      Objective   There were no vitals taken for this visit.    Physical Exam  Constitutional:       General: She is not in acute distress.     Appearance:  Normal appearance. She is not ill-appearing.   Pulmonary:      Effort: Pulmonary effort is normal.      Breath sounds: Normal breath sounds.   Skin:     General: Skin is warm and dry.   Neurological:      Mental Status: She is alert and oriented to person, place, and time.   Psychiatric:         Mood and Affect: Mood normal.         Behavior: Behavior normal.         Thought Content: Thought content normal.         Administrative Statements   Encounter provider Gabriel Montenegro, DO    The Patient is located at Home and in the following state in which I hold an active license [place].    The patient was identified by name and date of birth. Opal Arechiga was informed that this is a telemedicine visit and that the visit is being conducted through the Epic Embedded platform. She agrees to proceed..  My office door was closed. No one else was in the room.  She acknowledged consent and understanding of privacy and security of the video platform. The patient has agreed to participate and understands they can discontinue the visit at any time.    I have spent a total time of 20 minutes in caring for this patient on the day of the visit/encounter including Diagnostic results, Risks and benefits of tx options, Instructions for management, Impressions, Counseling / Coordination of care, and Documenting in the medical record, not including the time spent for establishing the audio/video connection.

## 2025-04-08 ENCOUNTER — TELEMEDICINE (OUTPATIENT)
Age: 70
End: 2025-04-08
Payer: MEDICARE

## 2025-04-08 DIAGNOSIS — U07.1 COVID: Primary | ICD-10-CM

## 2025-04-08 PROCEDURE — G2211 COMPLEX E/M VISIT ADD ON: HCPCS | Performed by: STUDENT IN AN ORGANIZED HEALTH CARE EDUCATION/TRAINING PROGRAM

## 2025-04-08 PROCEDURE — 99213 OFFICE O/P EST LOW 20 MIN: CPT | Performed by: STUDENT IN AN ORGANIZED HEALTH CARE EDUCATION/TRAINING PROGRAM

## 2025-04-08 RX ORDER — FLUTICASONE PROPIONATE 50 MCG
1 SPRAY, SUSPENSION (ML) NASAL DAILY
Qty: 11.1 ML | Refills: 0 | Status: SHIPPED | OUTPATIENT
Start: 2025-04-08

## 2025-04-08 NOTE — ASSESSMENT & PLAN NOTE
She presents today for virtual visit concerning of sore throat after testing positive for COVID-19 infection.    Patient first called our office on 04/03 stating that she had mild symptoms starting on 04/01. She tested positive for COVID-19 infection on 04/05/2025.   She is still experiencing a sore throat and congestion and she reports improvement but not 100% better.  She does state that she is about 99% better however she still has lingering symptoms of nasal congestion, ear pressure and popping, runny nose, postnasal drip, and sore throat.  She is no longer having any fever, chills, nausea, vomiting, diarrhea, constipation, chest pains, shortness of breath.  She has used Tessalon Perles prescribed by her primary care provider with good relief in her cough symptoms.  She is currently not using any nasal sprays.  I suspect that her postnasal drip is likely contributing to her throat irritation and cough.  Recommended using Flonase to relieve rhinorrhea and postnasal drip.  Orders:    fluticasone (FLONASE) 50 mcg/act nasal spray; 1 spray into each nostril daily

## 2025-04-23 DIAGNOSIS — K29.40 ATROPHIC GASTRITIS WITHOUT HEMORRHAGE: ICD-10-CM

## 2025-04-23 DIAGNOSIS — E78.00 PURE HYPERCHOLESTEROLEMIA: ICD-10-CM

## 2025-04-23 RX ORDER — ROSUVASTATIN CALCIUM 5 MG/1
5 TABLET, COATED ORAL DAILY
Qty: 90 TABLET | Refills: 1 | Status: SHIPPED | OUTPATIENT
Start: 2025-04-23

## 2025-04-23 RX ORDER — FAMOTIDINE 20 MG/1
20 TABLET, FILM COATED ORAL 2 TIMES DAILY
Qty: 180 TABLET | Refills: 1 | Status: SHIPPED | OUTPATIENT
Start: 2025-04-23

## 2025-04-23 NOTE — TELEPHONE ENCOUNTER
Reason for call:   [x] Refill   [] Prior Auth  [] Other:     Office:   [x] PCP/Provider - Dinah  [] Specialty/Provider -     Medication:   Famotidine 20 mg, 1 bid, 180  Rosuvastatin 5 mg, 1 qd, 90    Pharmacy:   [facility] [address], [place]     Local Pharmacy   Does the patient have enough for 3 days?   [x] Yes   [] No - Send as HP to POD    Mail Away Pharmacy   Does the patient have enough for 10 days?   [] Yes   [] No - Send as HP to POD

## 2025-05-06 DIAGNOSIS — I10 ESSENTIAL HYPERTENSION: ICD-10-CM

## 2025-05-07 RX ORDER — VALSARTAN AND HYDROCHLOROTHIAZIDE 320; 12.5 MG/1; MG/1
1 TABLET, FILM COATED ORAL DAILY
Qty: 90 TABLET | Refills: 1 | Status: SHIPPED | OUTPATIENT
Start: 2025-05-07

## 2025-05-30 DIAGNOSIS — U07.1 COVID: ICD-10-CM

## 2025-05-31 RX ORDER — FLUTICASONE PROPIONATE 50 MCG
SPRAY, SUSPENSION (ML) NASAL
Qty: 24 ML | Refills: 1 | Status: SHIPPED | OUTPATIENT
Start: 2025-05-31

## 2025-06-12 ENCOUNTER — RA CDI HCC (OUTPATIENT)
Dept: OTHER | Facility: HOSPITAL | Age: 70
End: 2025-06-12

## 2025-06-12 PROBLEM — Z86.16 PERSONAL HISTORY OF COVID-19: Status: ACTIVE | Noted: 2025-04-03

## 2025-06-12 PROBLEM — Z86.16 PERSONAL HISTORY OF COVID-19: Status: ACTIVE | Noted: 2025-06-12

## 2025-06-16 ENCOUNTER — APPOINTMENT (OUTPATIENT)
Dept: LAB | Age: 70
End: 2025-06-16
Attending: INTERNAL MEDICINE
Payer: MEDICARE

## 2025-06-16 DIAGNOSIS — E03.9 ACQUIRED HYPOTHYROIDISM: ICD-10-CM

## 2025-06-16 DIAGNOSIS — E11.9 TYPE 2 DIABETES MELLITUS WITHOUT COMPLICATION, WITHOUT LONG-TERM CURRENT USE OF INSULIN (HCC): ICD-10-CM

## 2025-06-16 DIAGNOSIS — I10 ESSENTIAL HYPERTENSION: ICD-10-CM

## 2025-06-16 LAB
ANION GAP SERPL CALCULATED.3IONS-SCNC: 6 MMOL/L (ref 4–13)
BUN SERPL-MCNC: 22 MG/DL (ref 5–25)
CALCIUM SERPL-MCNC: 10.3 MG/DL (ref 8.4–10.2)
CHLORIDE SERPL-SCNC: 95 MMOL/L (ref 96–108)
CO2 SERPL-SCNC: 36 MMOL/L (ref 21–32)
CREAT SERPL-MCNC: 0.78 MG/DL (ref 0.6–1.3)
EST. AVERAGE GLUCOSE BLD GHB EST-MCNC: 131 MG/DL
GFR SERPL CREATININE-BSD FRML MDRD: 77 ML/MIN/1.73SQ M
GLUCOSE P FAST SERPL-MCNC: 96 MG/DL (ref 65–99)
HBA1C MFR BLD: 6.2 %
POTASSIUM SERPL-SCNC: 4.3 MMOL/L (ref 3.5–5.3)
SODIUM SERPL-SCNC: 137 MMOL/L (ref 135–147)
TSH SERPL DL<=0.05 MIU/L-ACNC: 1.88 UIU/ML (ref 0.45–4.5)

## 2025-06-16 PROCEDURE — 36415 COLL VENOUS BLD VENIPUNCTURE: CPT

## 2025-06-16 PROCEDURE — 83036 HEMOGLOBIN GLYCOSYLATED A1C: CPT

## 2025-06-16 PROCEDURE — 84443 ASSAY THYROID STIM HORMONE: CPT

## 2025-06-16 PROCEDURE — 80048 BASIC METABOLIC PNL TOTAL CA: CPT

## 2025-06-18 ENCOUNTER — OFFICE VISIT (OUTPATIENT)
Age: 70
End: 2025-06-18
Payer: MEDICARE

## 2025-06-18 VITALS
DIASTOLIC BLOOD PRESSURE: 72 MMHG | BODY MASS INDEX: 47.37 KG/M2 | HEART RATE: 72 BPM | WEIGHT: 235 LBS | RESPIRATION RATE: 19 BRPM | HEIGHT: 59 IN | OXYGEN SATURATION: 92 % | SYSTOLIC BLOOD PRESSURE: 138 MMHG

## 2025-06-18 DIAGNOSIS — E11.9 TYPE 2 DIABETES MELLITUS WITHOUT COMPLICATION, WITHOUT LONG-TERM CURRENT USE OF INSULIN (HCC): ICD-10-CM

## 2025-06-18 DIAGNOSIS — E78.00 PURE HYPERCHOLESTEROLEMIA: ICD-10-CM

## 2025-06-18 DIAGNOSIS — G40.909 SEIZURE DISORDER (HCC): ICD-10-CM

## 2025-06-18 DIAGNOSIS — E66.813 OBESITY, CLASS III, BMI 40-49.9 (MORBID OBESITY): ICD-10-CM

## 2025-06-18 DIAGNOSIS — E03.9 ACQUIRED HYPOTHYROIDISM: Primary | ICD-10-CM

## 2025-06-18 DIAGNOSIS — I10 ESSENTIAL HYPERTENSION: ICD-10-CM

## 2025-06-18 PROCEDURE — G2211 COMPLEX E/M VISIT ADD ON: HCPCS | Performed by: INTERNAL MEDICINE

## 2025-06-18 PROCEDURE — 99214 OFFICE O/P EST MOD 30 MIN: CPT | Performed by: INTERNAL MEDICINE

## 2025-06-18 NOTE — ASSESSMENT & PLAN NOTE
Hyperlipidemia.  Patient remains on a low-dose of Crestor 5 mg daily.  Again patient states that she has had problems with her diet but she does try to avoid her intake of fats and cholesterol.

## 2025-06-18 NOTE — PROGRESS NOTES
Name: Opal Arechiga      : 1955      MRN: [id number]  Encounter Provider: Raghav Nix DO  Encounter Date: 2025   Encounter department: [facility]    Assessment & Plan  Acquired hypothyroidism  Thyroid function is stable.  Patient will continue present medication surveillance    Orders:  •  TSH, 3rd generation with Free T4 reflex; Future    Type 2 diabetes mellitus without complication, without long-term current use of insulin (HCC)  Patient has a history of diabetes mellitus type 2.  She remains on medication, metformin and initially this did help with some weight loss but it is definitely keeping her sugar under control with a hemoglobin A1c of 6.2.  Again she is working extremely hard on her diet and is asked greenly frustrated with her inability to lose weight.  Has an appointment to be seen by weight management and most likely will be a candidate for starting medication to help promote weight loss.  Patient underwent diabetic foot exam today in the office.  Reminded the patient of the importance of routine eye exams.  She will continue present medication and she was told that most likely if she is placed on medication for weight loss she will not need to stop the metformin.  Check a fasting blood sugar hemoglobin A1c with her next visit  Lab Results   Component Value Date    HGBA1C 6.2 (H) 2025       Orders:  •  Hemoglobin A1C; Future    Essential hypertension  History of hypertension.  Patient remains on medication as previously and blood pressure is controlled, renal function is stable.  Continue present medication surveillance.    Orders:  •  Basic metabolic panel; Future    Seizure disorder (HCC)  History of brain tumor with initial presentation with seizure.  Status post resection remains on anticoagulants and continues to follow-up with neurology.  No breakthrough seizure activity         Obesity, Class III, BMI 40-49.9 (morbid obesity)   morbid obesity  and despite working extremely hard on her diet has been unable to lose weight.  Has appointment to be seen by weight management in the future         Pure hypercholesterolemia  Hyperlipidemia.  Patient remains on a low-dose of Crestor 5 mg daily.  Again patient states that she has had problems with her diet but she does try to avoid her intake of fats and cholesterol.              History of Present Illness     Patient is a 70-year-old female history of multiple medical problems outlined previously who is here today for routine follow-up after 4-month period of time.  She did have labs performed prior to the visit and we did discuss the results with the patient which she is already reviewed online.  Patient states she is doing relatively well and has been spending a lot of time with her family and doing some traveling.  Patient admits she is extremely frustrated with her difficulty with her weight and inability to lose weight.  Review of Systems   Constitutional: Negative.    HENT: Negative.     Eyes: Negative.    Respiratory: Negative.     Cardiovascular: Negative.    Gastrointestinal: Negative.    Endocrine: Negative.    Genitourinary: Negative.    Musculoskeletal: Negative.    Skin: Negative.    Allergic/Immunologic: Negative.    Neurological: Negative.    Hematological: Negative.    Psychiatric/Behavioral: Negative.     Past Medical History[1]  Past Surgical History[2]  Family History[3]  Social History[4]  Medications[5]  Allergies   Allergen Reactions   • Hydromorphone Hcl Drowsiness, Lightheadedness and Nausea Only   • Medical Tape Irritability     Paper tape was used.      Immunization History   Administered Date(s) Administered   • COVID-19 MODERNA VACC 0.5 ML IM 01/07/2021, 02/05/2021, 11/02/2021   • INFLUENZA 11/25/2011   • Influenza, high dose seasonal 0.7 mL 10/03/2023   • Influenza, injectable, quadrivalent, preservative free 0.5 mL 09/19/2020   • Td (adult), Unspecified 01/01/2007     Objective   BP  "138/72 (BP Location: Left arm, Patient Position: Sitting, Cuff Size: Large)   Pulse 72   Resp 19   Ht 4' 11\" (1.499 m)   Wt 107 kg (235 lb)   SpO2 92%   BMI 47.46 kg/m²     Physical Exam  Vitals and nursing note reviewed.   Constitutional:       General: She is not in acute distress.     Appearance: Normal appearance. She is obese. She is not ill-appearing, toxic-appearing or diaphoretic.      Comments: Pleasant, articulate, obese 70-year-old female who is awake alert in no acute distress oriented x 3   HENT:      Head: Normocephalic and atraumatic.      Right Ear: Tympanic membrane, ear canal and external ear normal. There is no impacted cerumen.      Left Ear: Tympanic membrane, ear canal and external ear normal. There is no impacted cerumen.      Nose: Nose normal. No congestion or rhinorrhea.      Mouth/Throat:      Mouth: Mucous membranes are moist.      Pharynx: Oropharynx is clear. No oropharyngeal exudate or posterior oropharyngeal erythema.     Eyes:      General: No scleral icterus.        Right eye: No discharge.         Left eye: No discharge.      Extraocular Movements: Extraocular movements intact.      Conjunctiva/sclera: Conjunctivae normal.      Pupils: Pupils are equal, round, and reactive to light.     Neck:      Vascular: No carotid bruit.     Cardiovascular:      Rate and Rhythm: Normal rate and regular rhythm.      Pulses: Normal pulses.      Heart sounds: Normal heart sounds. No murmur heard.     No friction rub. No gallop.   Pulmonary:      Effort: Pulmonary effort is normal. No respiratory distress.      Breath sounds: Normal breath sounds. No stridor. No wheezing, rhonchi or rales.   Chest:      Chest wall: No tenderness.   Abdominal:      General: Abdomen is flat. Bowel sounds are normal. There is no distension.      Palpations: Abdomen is soft. There is no mass.      Tenderness: There is no abdominal tenderness. There is no right CVA tenderness, left CVA tenderness, guarding or " rebound.      Hernia: No hernia is present.     Musculoskeletal:         General: No swelling, tenderness, deformity or signs of injury.      Cervical back: Normal range of motion and neck supple. No rigidity or tenderness.      Right lower leg: Edema present.      Left lower leg: Edema present.      Comments: Patient has trace to +1 edema bilateral lower extremities which she states is worse by the end of the day.  Status post total knee replacement on the right, patient relates now ambulating without pain.   Lymphadenopathy:      Cervical: No cervical adenopathy.     Skin:     General: Skin is warm and dry.      Coloration: Skin is not jaundiced or pale.      Findings: No bruising, erythema, lesion or rash.     Neurological:      General: No focal deficit present.      Mental Status: She is alert and oriented to person, place, and time. Mental status is at baseline.      Cranial Nerves: No cranial nerve deficit.      Sensory: No sensory deficit.      Motor: No weakness.      Coordination: Coordination normal.      Gait: Gait normal.      Deep Tendon Reflexes: Reflexes abnormal.     Psychiatric:         Mood and Affect: Mood normal.         Behavior: Behavior normal.         Thought Content: Thought content normal.         Judgment: Judgment normal.            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     • Arthritis Sister Akilah Vaca    • Arthritis Brother Pan Arechiga             [4]  Social History  Tobacco Use   • Smoking status: Former     Current packs/day: 0.00     Average packs/day: 1 pack/day for 25.0 years (25.0 ttl pk-yrs)     Types: Cigarettes     Start date: 1982     Quit date: 2007     Years since quittin.5   • Smokeless tobacco: Never   • Tobacco comments:     Quit off & on up until . Haven't smoked since   Vaping Use   • Vaping status: Never Used   Substance and Sexual Activity   • Alcohol use: No   • Drug use: Never   • Sexual activity: Not Currently     Partners: Male     Birth control/protection: None   [5]  Current Outpatient Medications on File Prior to Visit   Medication Sig   • amLODIPine (NORVASC) 10 mg tablet TAKE 1 TABLET BY MOUTH EVERY DAY   • aspirin (ECOTRIN LOW STRENGTH) 81 mg EC tablet Take 81 mg by mouth   • famotidine (PEPCID) 20 mg tablet Take 1 tablet (20 mg total) by mouth 2 (two) times a day   • fluticasone (FLONASE) 50 mcg/act nasal spray SPRAY 1 SPRAY INTO EACH NOSTRIL EVERY DAY   • levETIRAcetam (KEPPRA) 250 mg tablet    • levothyroxine 125 mcg tablet Take 1 tablet (125 mcg total) by mouth daily in the early morning   • naproxen (NAPROSYN) 500 mg tablet    • rosuvastatin (CRESTOR) 5 mg tablet Take 1 tablet (5 mg total) by mouth daily   • valsartan-hydrochlorothiazide (DIOVAN-HCT) 320-12.5 MG per tablet TAKE 1 TABLET BY MOUTH EVERY DAY   • Vitamin D, Ergocalciferol, 50 MCG (2000 UT) CAPS Take by mouth   • benzonatate (TESSALON PERLES) 100 mg capsule Take 1 capsule (100 mg total) by mouth 3 (three) times a day as needed for cough   • loratadine (CLARITIN) 10 mg tablet Take 10 mg by mouth daily   • metFORMIN (GLUCOPHAGE-XR) 500 mg 24 hr tablet TAKE  1 TABLET BY MOUTH EVERY DAY WITH DINNER   • traMADol (ULTRAM) 50 mg tablet Take 50 mg by mouth every 6 (six) hours as needed

## 2025-06-18 NOTE — ASSESSMENT & PLAN NOTE
Patient has a history of diabetes mellitus type 2.  She remains on medication, metformin and initially this did help with some weight loss but it is definitely keeping her sugar under control with a hemoglobin A1c of 6.2.  Again she is working extremely hard on her diet and is asked greenly frustrated with her inability to lose weight.  Has an appointment to be seen by weight management and most likely will be a candidate for starting medication to help promote weight loss.  Patient underwent diabetic foot exam today in the office.  Reminded the patient of the importance of routine eye exams.  She will continue present medication and she was told that most likely if she is placed on medication for weight loss she will not need to stop the metformin.  Check a fasting blood sugar hemoglobin A1c with her next visit  Lab Results   Component Value Date    HGBA1C 6.2 (H) 06/16/2025       Orders:    Hemoglobin A1C; Future

## 2025-06-18 NOTE — ASSESSMENT & PLAN NOTE
Thyroid function is stable.  Patient will continue present medication surveillance    Orders:    TSH, 3rd generation with Free T4 reflex; Future

## 2025-06-18 NOTE — ASSESSMENT & PLAN NOTE
History of brain tumor with initial presentation with seizure.  Status post resection remains on anticoagulants and continues to follow-up with neurology.  No breakthrough seizure activity

## 2025-06-18 NOTE — ASSESSMENT & PLAN NOTE
History of hypertension.  Patient remains on medication as previously and blood pressure is controlled, renal function is stable.  Continue present medication surveillance.    Orders:    Basic metabolic panel; Future

## 2025-06-18 NOTE — ASSESSMENT & PLAN NOTE
morbid obesity and despite working extremely hard on her diet has been unable to lose weight.  Has appointment to be seen by weight management in the future

## 2025-07-21 DIAGNOSIS — U07.1 COVID: ICD-10-CM

## 2025-07-21 RX ORDER — BENZONATATE 100 MG/1
100 CAPSULE ORAL 3 TIMES DAILY PRN
Qty: 45 CAPSULE | Refills: 0 | Status: SHIPPED | OUTPATIENT
Start: 2025-07-21

## 2025-07-22 NOTE — PROGRESS NOTES
Assessment & Plan  Class 3 Obesity    Type 2 diabetes mellitus without complication (HCC)    Lab Results   Component Value Date    HGBA1C 6.2 (H) 06/16/2025     Highest weight: 251 (2016)  Lowest Weight: 229  Previous weight: 240  Current weight: 232  Goal: 200lb  TBW: 7.5      Dietary modifications: Recommend to avoid skipping any meals. Meal frequency (3 meals 2-3 snacks in between) can potentially improve metabolism and allow for better portion control by decreasing Ghrelin (hunger hormone).  Informed patient that a lot of her hunger may be secondary to lack of fluids which I recommend to increase.    Mindful Eating and Drinking is necessary to promote healthy behaviors that can lead to decreased adipose tissue which can be curative and preventative for comorbidities such as hypertension, diabetes, anxiety, depression, osteoarthritis, dyslipidemia, asthma, liver disease, cardiovascular disease, stroke, sleep apnea and cancers.    Patient's co-morbilities include type 2 diabetes, GERD     Macronutrients: (Nutrients that the body needs for energy and support vital functions)    Protein is necessary to promote satiety, metabolism, and muscle growth/repair. Increased energy expenditure is used for the processes of breaking down and rebuilding proteins within the body   Carbohydrates are essential as it is the body's main fuel source for daily activities.  Recommend that carbohydrates be consumed mostly during the times you are more active rather than high amounts before bedtime  Fats: Essential vitamins like A, D, and E, protect your organs, support cell growth and contribute to maintaining healthy cholesterol levels      Fluid intake which is at least half your body weight in ounces is necessary to help control cravings (decreasing confusion for appetite vs water deprivation) as the human body is made up of 50-70% of Fluids. If there is a diversion for water alone, would recommend flavored water (example-splash of  lemonade or ice tea) to help promote compliance. Fluids include Teas, water, flavored water, seltzer water, coffee, shakes    Metabolism:    Metabolism can also be promoted by meal frequency, protein intake and increased muscle mass     Daily Calorie Needs: are individualized and will need to take into account any fluid losses, increased activity levels which may need to be adjusted daily. Recommended to not skip any meals even if there is a lack of appetite as it can be suppressed by caffeine or any prior heavy meal due to Leptin (satiety hormone).  Calorie and fluid intake will need to be increased if there is any increased activity during the day compared to previous days.  With proper fluid and macronutrient intake throughout the day, portion control and decreased cravings will improve     Weight check: BMI and weight serve as only guidelines as it is not factor in muscle mass, fat, water. Weights can fluctuate depending on fluid shifts vs what foods are consumed prior to checking your weight      Return in about 3 months (around 10/23/2025) for followup .     Most recent notes, labs and previous medical records were reviewed.       ______________________________________________________________________        Subjective:     Chief Complaint   Patient presents with    Follow-up     MWM F/u; Waist 52in       HPI:  70-year-old female past medical history of class III obesity, hypertension, hypothyroidism, dyslipidemia, GERD, type 2 diabetes new onset A1c 6.6, seizure disorder presents to the clinic for follow-up    Previous attempts: Diet and Exercise, healthy portions, Partial meal replacement     -Started Metfromin couple months ago     Dietary Regimen:  Breakfast: Egg, low carb bagels, oatmeal             Lunch: Yogurt, berries   Dinner: [place], meat potatoes, chicken, fish   Cravings: sweets   Dining out:  once a week  Fluids:Water 30 oz             Ice tea (artifical sweetener)            Winters milk with  "cereal or overnight oats    Hobbies: paintings, [employer]     Physical Activity:  -Chair exercises every day. Knee exercises   Occupation:Retired CT scan tech             Review Of Systems:  General: No pallor, no weakness   Pulmonary: Negative for shortness of breath  Chest: negative for chest pain  Gastrointestinal:  Negative for abdominal pain, diarrhea   Psychiatric/Behavioral:  Negative for behavioral problems, confusion, dysphoric mood and hallucinations.    All other systems reviewed and are negative.     Objective:  /78 (BP Location: Left arm, Patient Position: Sitting)   Pulse 76   Temp 97.8 °F (36.6 °C) (Tympanic)   Resp 16   Ht 4' 11\" (1.499 m)   Wt 106 kg (232 lb 9.6 oz)   BMI 46.98 kg/m²     Wt Readings from Last 30 Encounters:   07/23/25 106 kg (232 lb 9.6 oz)   06/18/25 107 kg (235 lb)   02/27/25 109 kg (240 lb)   02/24/25 109 kg (240 lb 6.4 oz)   02/17/25 110 kg (243 lb)   09/24/24 110 kg (242 lb)   08/07/24 110 kg (243 lb)   05/29/24 106 kg (234 lb)   04/09/24 104 kg (229 lb 12.8 oz)   02/15/24 103 kg (226 lb)   10/03/23 98.4 kg (217 lb)   02/13/23 101 kg (222 lb 6.4 oz)   09/16/22 94.6 kg (208 lb 9.6 oz)   05/16/22 93.9 kg (207 lb)   01/24/22 92.1 kg (203 lb)   07/19/21 91.2 kg (201 lb)   01/18/21 105 kg (232 lb)   10/16/20 104 kg (229 lb)   02/17/20 102 kg (224 lb)   11/18/19 99.9 kg (220 lb 3.2 oz)   07/26/19 106 kg (234 lb)   01/25/19 103 kg (227 lb 6.4 oz)   06/28/18 102 kg (224 lb)   12/28/17 101 kg (222 lb)   10/26/17 99.8 kg (220 lb)   12/15/16 113 kg (249 lb 6 oz)   08/23/16 114 kg (251 lb 6 oz)   05/27/16 110 kg (243 lb)       Physical Exam  Constitutional:       General: No acute distress.  Well-nourished  HENT:      Head: Normocephalic and atraumatic.   Eyes:      Extraocular Movements: Extraocular movements intact.      Conjunctiva/pupils: Conjunctivae normal. Pupils are equal, round  Pulmonary:      Effort: Pulmonary effort is normal. No labored breathing   Neurological:      " General: No focal deficit present.  AO x 3     Mental Status: Alert and oriented to person, place, and time. Mental status is at baseline.   Psychiatric:         Mood and Affect: Mood normal.         Behavior: Behavior normal.     Labs and Imaging  Recent labs and imaging have been personally reviewed.

## 2025-07-23 ENCOUNTER — OFFICE VISIT (OUTPATIENT)
Dept: BARIATRICS | Facility: CLINIC | Age: 70
End: 2025-07-23
Payer: MEDICARE

## 2025-07-23 VITALS
BODY MASS INDEX: 46.89 KG/M2 | HEART RATE: 76 BPM | TEMPERATURE: 97.8 F | HEIGHT: 59 IN | DIASTOLIC BLOOD PRESSURE: 78 MMHG | SYSTOLIC BLOOD PRESSURE: 134 MMHG | WEIGHT: 232.6 LBS | RESPIRATION RATE: 16 BRPM

## 2025-07-23 DIAGNOSIS — E66.813 CLASS 3 SEVERE OBESITY WITH SERIOUS COMORBIDITY AND BODY MASS INDEX (BMI) OF 40.0 TO 44.9 IN ADULT, UNSPECIFIED OBESITY TYPE: Primary | ICD-10-CM

## 2025-07-23 DIAGNOSIS — E11.9 TYPE 2 DIABETES MELLITUS WITHOUT COMPLICATION (HCC): ICD-10-CM

## 2025-07-23 DIAGNOSIS — R73.03 PREDIABETES: ICD-10-CM

## 2025-07-23 PROCEDURE — 99214 OFFICE O/P EST MOD 30 MIN: CPT | Performed by: STUDENT IN AN ORGANIZED HEALTH CARE EDUCATION/TRAINING PROGRAM
